# Patient Record
Sex: FEMALE | NOT HISPANIC OR LATINO | URBAN - METROPOLITAN AREA
[De-identification: names, ages, dates, MRNs, and addresses within clinical notes are randomized per-mention and may not be internally consistent; named-entity substitution may affect disease eponyms.]

---

## 2017-08-20 ENCOUNTER — CHARTING TRANS (OUTPATIENT)
Dept: URGENT CARE | Age: 47
End: 2017-08-20

## 2017-08-20 ASSESSMENT — PAIN SCALES - GENERAL: PAINLEVEL_OUTOF10: 8

## 2017-08-21 ENCOUNTER — CHARTING TRANS (OUTPATIENT)
Dept: OTHER | Age: 47
End: 2017-08-21

## 2017-08-22 ENCOUNTER — CHARTING TRANS (OUTPATIENT)
Dept: OTHER | Age: 47
End: 2017-08-22

## 2018-01-24 ENCOUNTER — OFFICE VISIT (OUTPATIENT)
Dept: OTHER | Facility: HOSPITAL | Age: 48
End: 2018-01-24
Attending: FAMILY MEDICINE
Payer: OTHER MISCELLANEOUS

## 2018-01-24 DIAGNOSIS — S86.911A KNEE STRAIN, RIGHT, INITIAL ENCOUNTER: Primary | ICD-10-CM

## 2018-01-26 ENCOUNTER — APPOINTMENT (OUTPATIENT)
Dept: OTHER | Facility: HOSPITAL | Age: 48
End: 2018-01-26
Attending: PREVENTIVE MEDICINE
Payer: OTHER MISCELLANEOUS

## 2018-11-28 VITALS
SYSTOLIC BLOOD PRESSURE: 143 MMHG | DIASTOLIC BLOOD PRESSURE: 78 MMHG | HEART RATE: 93 BPM | TEMPERATURE: 99.4 F | RESPIRATION RATE: 16 BRPM | OXYGEN SATURATION: 98 %

## 2022-08-24 ENCOUNTER — TELEPHONE (OUTPATIENT)
Dept: SCHEDULING | Age: 52
End: 2022-08-24

## 2024-01-01 ENCOUNTER — APPOINTMENT (OUTPATIENT)
Dept: GENERAL RADIOLOGY | Facility: HOSPITAL | Age: 54
End: 2024-01-01
Attending: NURSE PRACTITIONER
Payer: COMMERCIAL

## 2024-01-01 ENCOUNTER — APPOINTMENT (OUTPATIENT)
Dept: CT IMAGING | Facility: HOSPITAL | Age: 54
End: 2024-01-01
Attending: EMERGENCY MEDICINE
Payer: COMMERCIAL

## 2024-01-01 ENCOUNTER — APPOINTMENT (OUTPATIENT)
Dept: ULTRASOUND IMAGING | Facility: HOSPITAL | Age: 54
End: 2024-01-01
Attending: HOSPITALIST
Payer: COMMERCIAL

## 2024-01-01 ENCOUNTER — APPOINTMENT (OUTPATIENT)
Dept: CT IMAGING | Facility: HOSPITAL | Age: 54
End: 2024-01-01
Attending: HOSPITALIST
Payer: COMMERCIAL

## 2024-01-01 ENCOUNTER — HOSPITAL ENCOUNTER (INPATIENT)
Facility: HOSPITAL | Age: 54
LOS: 11 days | End: 2024-01-01
Attending: EMERGENCY MEDICINE | Admitting: HOSPITALIST
Payer: COMMERCIAL

## 2024-01-01 ENCOUNTER — APPOINTMENT (OUTPATIENT)
Dept: CV DIAGNOSTICS | Facility: HOSPITAL | Age: 54
End: 2024-01-01
Payer: COMMERCIAL

## 2024-01-01 ENCOUNTER — APPOINTMENT (OUTPATIENT)
Dept: GENERAL RADIOLOGY | Facility: HOSPITAL | Age: 54
End: 2024-01-01
Attending: INTERNAL MEDICINE
Payer: COMMERCIAL

## 2024-01-01 ENCOUNTER — APPOINTMENT (OUTPATIENT)
Dept: GENERAL RADIOLOGY | Facility: HOSPITAL | Age: 54
End: 2024-01-01
Attending: HOSPITALIST
Payer: COMMERCIAL

## 2024-01-01 ENCOUNTER — APPOINTMENT (OUTPATIENT)
Dept: GENERAL RADIOLOGY | Facility: HOSPITAL | Age: 54
End: 2024-01-01
Attending: EMERGENCY MEDICINE
Payer: COMMERCIAL

## 2024-01-01 ENCOUNTER — GENETICS ENCOUNTER (OUTPATIENT)
Dept: HEMATOLOGY/ONCOLOGY | Facility: HOSPITAL | Age: 54
End: 2024-01-01

## 2024-01-01 ENCOUNTER — APPOINTMENT (OUTPATIENT)
Dept: ULTRASOUND IMAGING | Facility: HOSPITAL | Age: 54
End: 2024-01-01
Attending: PHYSICIAN ASSISTANT
Payer: COMMERCIAL

## 2024-01-01 ENCOUNTER — APPOINTMENT (OUTPATIENT)
Dept: MRI IMAGING | Facility: HOSPITAL | Age: 54
End: 2024-01-01
Attending: HOSPITALIST
Payer: COMMERCIAL

## 2024-01-01 ENCOUNTER — APPOINTMENT (OUTPATIENT)
Dept: GENERAL RADIOLOGY | Facility: HOSPITAL | Age: 54
End: 2024-01-01
Payer: COMMERCIAL

## 2024-01-01 VITALS
HEIGHT: 62 IN | OXYGEN SATURATION: 43 % | TEMPERATURE: 98 F | BODY MASS INDEX: 34.03 KG/M2 | DIASTOLIC BLOOD PRESSURE: 59 MMHG | WEIGHT: 184.94 LBS | RESPIRATION RATE: 42 BRPM | SYSTOLIC BLOOD PRESSURE: 117 MMHG

## 2024-01-01 DIAGNOSIS — D72.829 LEUKOCYTOSIS, UNSPECIFIED TYPE: ICD-10-CM

## 2024-01-01 DIAGNOSIS — D64.9 ANEMIA, UNSPECIFIED TYPE: ICD-10-CM

## 2024-01-01 DIAGNOSIS — C79.9 METASTATIC MALIGNANT NEOPLASM, UNSPECIFIED SITE (HCC): Primary | ICD-10-CM

## 2024-01-01 DIAGNOSIS — E87.1 HYPONATREMIA: ICD-10-CM

## 2024-01-01 LAB
ADENOVIRUS PCR:: NOT DETECTED
AFP-TM SERPL-MCNC: 8.7 NG/ML (ref ?–8)
ALBUMIN SERPL-MCNC: 2.9 G/DL (ref 3.4–5)
ALBUMIN SERPL-MCNC: 3 G/DL (ref 3.4–5)
ALBUMIN SERPL-MCNC: 3 G/DL (ref 3.4–5)
ALBUMIN SERPL-MCNC: 3.1 G/DL (ref 3.4–5)
ALBUMIN SERPL-MCNC: 3.6 G/DL (ref 3.4–5)
ALBUMIN/GLOB SERPL: 0.8 {RATIO} (ref 1–2)
ALBUMIN/GLOB SERPL: 0.9 {RATIO} (ref 1–2)
ALP LIVER SERPL-CCNC: 193 U/L
ALP LIVER SERPL-CCNC: 211 U/L
ALP LIVER SERPL-CCNC: 294 U/L
ALT SERPL-CCNC: 36 U/L
ALT SERPL-CCNC: 39 U/L
ALT SERPL-CCNC: 46 U/L
ALT SERPL-CCNC: 48 U/L
ALT SERPL-CCNC: 48 U/L
AMMONIA PLAS-MCNC: 55 UMOL/L (ref 11–32)
AMMONIA PLAS-MCNC: 61 UMOL/L (ref 11–32)
ANION GAP SERPL CALC-SCNC: 0 MMOL/L (ref 0–18)
ANION GAP SERPL CALC-SCNC: 10 MMOL/L (ref 0–18)
ANION GAP SERPL CALC-SCNC: 11 MMOL/L (ref 0–18)
ANION GAP SERPL CALC-SCNC: 11 MMOL/L (ref 0–18)
ANION GAP SERPL CALC-SCNC: 13 MMOL/L (ref 0–18)
ANION GAP SERPL CALC-SCNC: 3 MMOL/L (ref 0–18)
ANION GAP SERPL CALC-SCNC: 4 MMOL/L (ref 0–18)
ANION GAP SERPL CALC-SCNC: 5 MMOL/L (ref 0–18)
ANION GAP SERPL CALC-SCNC: 5 MMOL/L (ref 0–18)
ANION GAP SERPL CALC-SCNC: 6 MMOL/L (ref 0–18)
ANION GAP SERPL CALC-SCNC: 6 MMOL/L (ref 0–18)
ANION GAP SERPL CALC-SCNC: 7 MMOL/L (ref 0–18)
ANION GAP SERPL CALC-SCNC: 8 MMOL/L (ref 0–18)
ANION GAP SERPL CALC-SCNC: 9 MMOL/L (ref 0–18)
ANTIBODY SCREEN: NEGATIVE
APTT PPP: 28.4 SECONDS (ref 23.3–35.6)
ARTERIAL PATENCY WRIST A: POSITIVE
AST SERPL-CCNC: 117 U/L (ref 15–37)
AST SERPL-CCNC: 120 U/L (ref 15–37)
AST SERPL-CCNC: 120 U/L (ref 15–37)
AST SERPL-CCNC: 75 U/L (ref 15–37)
AST SERPL-CCNC: 96 U/L (ref 15–37)
ATRIAL RATE: 106 BPM
ATRIAL RATE: 115 BPM
ATRIAL RATE: 241 BPM
B PARAPERT DNA SPEC QL NAA+PROBE: NOT DETECTED
B PERT DNA SPEC QL NAA+PROBE: NOT DETECTED
BASE EXCESS BLDA CALC-SCNC: -2.3 MMOL/L (ref ?–2)
BASE EXCESS BLDA CALC-SCNC: -2.5 MMOL/L (ref ?–2)
BASE EXCESS BLDA CALC-SCNC: -2.8 MMOL/L (ref ?–2)
BASE EXCESS BLDA CALC-SCNC: 0.1 MMOL/L (ref ?–2)
BASE EXCESS BLDA CALC-SCNC: 1.3 MMOL/L (ref ?–2)
BASOPHILS # BLD: 0 X10(3) UL (ref 0–0.2)
BASOPHILS # BLD: 0.37 X10(3) UL (ref 0–0.2)
BASOPHILS # BLD: 0.64 X10(3) UL (ref 0–0.2)
BASOPHILS # BLD: 0.95 X10(3) UL (ref 0–0.2)
BASOPHILS NFR BLD: 0 %
BASOPHILS NFR BLD: 1 %
BASOPHILS NFR BLD: 2 %
BASOPHILS NFR BLD: 3 %
BILIRUB DIRECT SERPL-MCNC: 0.4 MG/DL (ref 0–0.2)
BILIRUB DIRECT SERPL-MCNC: 0.6 MG/DL (ref 0–0.2)
BILIRUB DIRECT SERPL-MCNC: 0.6 MG/DL (ref 0–0.2)
BILIRUB SERPL-MCNC: 0.8 MG/DL (ref 0.1–2)
BILIRUB SERPL-MCNC: 0.9 MG/DL (ref 0.1–2)
BILIRUB SERPL-MCNC: 1 MG/DL (ref 0.1–2)
BILIRUB UR QL STRIP.AUTO: NEGATIVE
BLOOD TYPE BARCODE: 5100
BLOOD TYPE BARCODE: 5100
BODY TEMPERATURE: 98.6 F
BUN BLD-MCNC: 12 MG/DL (ref 9–23)
BUN BLD-MCNC: 12 MG/DL (ref 9–23)
BUN BLD-MCNC: 13 MG/DL (ref 9–23)
BUN BLD-MCNC: 14 MG/DL (ref 9–23)
BUN BLD-MCNC: 15 MG/DL (ref 9–23)
BUN BLD-MCNC: 16 MG/DL (ref 9–23)
BUN BLD-MCNC: 18 MG/DL (ref 9–23)
BUN BLD-MCNC: 23 MG/DL (ref 9–23)
BUN BLD-MCNC: 25 MG/DL (ref 9–23)
BUN BLD-MCNC: 26 MG/DL (ref 9–23)
BUN BLD-MCNC: 28 MG/DL (ref 9–23)
BUN BLD-MCNC: 33 MG/DL (ref 9–23)
BUN BLD-MCNC: 34 MG/DL (ref 9–23)
BUN BLD-MCNC: 37 MG/DL (ref 9–23)
BUN BLD-MCNC: 38 MG/DL (ref 9–23)
BUN BLD-MCNC: 9 MG/DL (ref 9–23)
C PNEUM DNA SPEC QL NAA+PROBE: NOT DETECTED
CALCIUM BLD-MCNC: 10 MG/DL (ref 8.5–10.1)
CALCIUM BLD-MCNC: 10.1 MG/DL (ref 8.5–10.1)
CALCIUM BLD-MCNC: 10.2 MG/DL (ref 8.5–10.1)
CALCIUM BLD-MCNC: 10.2 MG/DL (ref 8.5–10.1)
CALCIUM BLD-MCNC: 10.3 MG/DL (ref 8.5–10.1)
CALCIUM BLD-MCNC: 10.5 MG/DL (ref 8.5–10.1)
CALCIUM BLD-MCNC: 8.6 MG/DL (ref 8.5–10.1)
CALCIUM BLD-MCNC: 8.7 MG/DL (ref 8.5–10.1)
CALCIUM BLD-MCNC: 8.8 MG/DL (ref 8.5–10.1)
CALCIUM BLD-MCNC: 8.8 MG/DL (ref 8.5–10.1)
CALCIUM BLD-MCNC: 8.9 MG/DL (ref 8.5–10.1)
CALCIUM BLD-MCNC: 9 MG/DL (ref 8.5–10.1)
CALCIUM BLD-MCNC: 9 MG/DL (ref 8.5–10.1)
CALCIUM BLD-MCNC: 9.1 MG/DL (ref 8.5–10.1)
CALCIUM BLD-MCNC: 9.1 MG/DL (ref 8.5–10.1)
CALCIUM BLD-MCNC: 9.3 MG/DL (ref 8.5–10.1)
CALCIUM BLD-MCNC: 9.3 MG/DL (ref 8.5–10.1)
CALCIUM BLD-MCNC: 9.4 MG/DL (ref 8.5–10.1)
CALCIUM BLD-MCNC: 9.7 MG/DL (ref 8.5–10.1)
CALCIUM BLD-MCNC: 9.7 MG/DL (ref 8.5–10.1)
CALCIUM BLD-MCNC: 9.8 MG/DL (ref 8.5–10.1)
CALCIUM BLD-MCNC: 9.9 MG/DL (ref 8.5–10.1)
CALCIUM BLD-MCNC: 9.9 MG/DL (ref 8.5–10.1)
CANCER AG125 SERPL-ACNC: 61.7 U/ML (ref ?–35)
CANCER AG19-9 SERPL-ACNC: 150.3 U/ML (ref ?–37)
CEA SERPL-MCNC: 203.3 NG/ML (ref ?–5)
CHLORIDE SERPL-SCNC: 101 MMOL/L (ref 98–112)
CHLORIDE SERPL-SCNC: 105 MMOL/L (ref 98–112)
CHLORIDE SERPL-SCNC: 107 MMOL/L (ref 98–112)
CHLORIDE SERPL-SCNC: 109 MMOL/L (ref 98–112)
CHLORIDE SERPL-SCNC: 110 MMOL/L (ref 98–112)
CHLORIDE SERPL-SCNC: 111 MMOL/L (ref 98–112)
CHLORIDE SERPL-SCNC: 112 MMOL/L (ref 98–112)
CHLORIDE SERPL-SCNC: 112 MMOL/L (ref 98–112)
CHLORIDE SERPL-SCNC: 113 MMOL/L (ref 98–112)
CHLORIDE SERPL-SCNC: 114 MMOL/L (ref 98–112)
CHLORIDE SERPL-SCNC: 114 MMOL/L (ref 98–112)
CHLORIDE SERPL-SCNC: 92 MMOL/L (ref 98–112)
CHLORIDE SERPL-SCNC: 95 MMOL/L (ref 98–112)
CHLORIDE SERPL-SCNC: 97 MMOL/L (ref 98–112)
CHLORIDE SERPL-SCNC: 98 MMOL/L (ref 98–112)
CHLORIDE SERPL-SCNC: 99 MMOL/L (ref 98–112)
CLARITY UR REFRACT.AUTO: CLEAR
CO2 SERPL-SCNC: 19 MMOL/L (ref 21–32)
CO2 SERPL-SCNC: 20 MMOL/L (ref 21–32)
CO2 SERPL-SCNC: 20 MMOL/L (ref 21–32)
CO2 SERPL-SCNC: 21 MMOL/L (ref 21–32)
CO2 SERPL-SCNC: 22 MMOL/L (ref 21–32)
CO2 SERPL-SCNC: 22 MMOL/L (ref 21–32)
CO2 SERPL-SCNC: 23 MMOL/L (ref 21–32)
CO2 SERPL-SCNC: 23 MMOL/L (ref 21–32)
CO2 SERPL-SCNC: 24 MMOL/L (ref 21–32)
CO2 SERPL-SCNC: 25 MMOL/L (ref 21–32)
CO2 SERPL-SCNC: 26 MMOL/L (ref 21–32)
CO2 SERPL-SCNC: 26 MMOL/L (ref 21–32)
CO2 SERPL-SCNC: 27 MMOL/L (ref 21–32)
CO2 SERPL-SCNC: 28 MMOL/L (ref 21–32)
CO2 SERPL-SCNC: 28 MMOL/L (ref 21–32)
COHGB MFR BLD: 1.3 % SAT (ref 0–3)
COHGB MFR BLD: 1.3 % SAT (ref 0–3)
COHGB MFR BLD: 1.4 % SAT (ref 0–3)
COHGB MFR BLD: 1.8 % SAT (ref 0–3)
COHGB MFR BLD: 2 % SAT (ref 0–3)
COLOR UR AUTO: YELLOW
COLOR UR AUTO: YELLOW
CORONAVIRUS 229E PCR:: NOT DETECTED
CORONAVIRUS HKU1 PCR:: NOT DETECTED
CORONAVIRUS NL63 PCR:: NOT DETECTED
CORONAVIRUS OC43 PCR:: NOT DETECTED
CREAT BLD-MCNC: 0.52 MG/DL
CREAT BLD-MCNC: 0.55 MG/DL
CREAT BLD-MCNC: 0.56 MG/DL
CREAT BLD-MCNC: 0.57 MG/DL
CREAT BLD-MCNC: 0.58 MG/DL
CREAT BLD-MCNC: 0.6 MG/DL
CREAT BLD-MCNC: 0.62 MG/DL
CREAT BLD-MCNC: 0.62 MG/DL
CREAT BLD-MCNC: 0.63 MG/DL
CREAT BLD-MCNC: 0.63 MG/DL
CREAT BLD-MCNC: 0.64 MG/DL
CREAT BLD-MCNC: 0.65 MG/DL
CREAT BLD-MCNC: 0.65 MG/DL
CREAT BLD-MCNC: 0.66 MG/DL
CREAT BLD-MCNC: 0.68 MG/DL
CREAT BLD-MCNC: 0.69 MG/DL
CREAT BLD-MCNC: 0.71 MG/DL
CREAT BLD-MCNC: 0.75 MG/DL
CREAT BLD-MCNC: 0.79 MG/DL
CREAT BLD-MCNC: 0.8 MG/DL
CREAT BLD-MCNC: 0.81 MG/DL
CREAT BLD-MCNC: 0.87 MG/DL
CREAT BLD-MCNC: 0.95 MG/DL
CREAT BLD-MCNC: 0.97 MG/DL
CREAT BLD-MCNC: 1.18 MG/DL
DEPRECATED HBV CORE AB SER IA-ACNC: 54 NG/ML
EGFRCR SERPLBLD CKD-EPI 2021: 102 ML/MIN/1.73M2 (ref 60–?)
EGFRCR SERPLBLD CKD-EPI 2021: 104 ML/MIN/1.73M2 (ref 60–?)
EGFRCR SERPLBLD CKD-EPI 2021: 105 ML/MIN/1.73M2 (ref 60–?)
EGFRCR SERPLBLD CKD-EPI 2021: 106 ML/MIN/1.73M2 (ref 60–?)
EGFRCR SERPLBLD CKD-EPI 2021: 107 ML/MIN/1.73M2 (ref 60–?)
EGFRCR SERPLBLD CKD-EPI 2021: 108 ML/MIN/1.73M2 (ref 60–?)
EGFRCR SERPLBLD CKD-EPI 2021: 109 ML/MIN/1.73M2 (ref 60–?)
EGFRCR SERPLBLD CKD-EPI 2021: 109 ML/MIN/1.73M2 (ref 60–?)
EGFRCR SERPLBLD CKD-EPI 2021: 110 ML/MIN/1.73M2 (ref 60–?)
EGFRCR SERPLBLD CKD-EPI 2021: 111 ML/MIN/1.73M2 (ref 60–?)
EGFRCR SERPLBLD CKD-EPI 2021: 55 ML/MIN/1.73M2 (ref 60–?)
EGFRCR SERPLBLD CKD-EPI 2021: 70 ML/MIN/1.73M2 (ref 60–?)
EGFRCR SERPLBLD CKD-EPI 2021: 72 ML/MIN/1.73M2 (ref 60–?)
EGFRCR SERPLBLD CKD-EPI 2021: 80 ML/MIN/1.73M2 (ref 60–?)
EGFRCR SERPLBLD CKD-EPI 2021: 87 ML/MIN/1.73M2 (ref 60–?)
EGFRCR SERPLBLD CKD-EPI 2021: 88 ML/MIN/1.73M2 (ref 60–?)
EGFRCR SERPLBLD CKD-EPI 2021: 89 ML/MIN/1.73M2 (ref 60–?)
EGFRCR SERPLBLD CKD-EPI 2021: 95 ML/MIN/1.73M2 (ref 60–?)
EOSINOPHIL # BLD: 0 X10(3) UL (ref 0–0.7)
EOSINOPHIL # BLD: 0.31 X10(3) UL (ref 0–0.7)
EOSINOPHIL # BLD: 0.32 X10(3) UL (ref 0–0.7)
EOSINOPHIL # BLD: 0.35 X10(3) UL (ref 0–0.7)
EOSINOPHIL # BLD: 0.39 X10(3) UL (ref 0–0.7)
EOSINOPHIL # BLD: 1.23 X10(3) UL (ref 0–0.7)
EOSINOPHIL NFR BLD: 0 %
EOSINOPHIL NFR BLD: 1 %
EOSINOPHIL NFR BLD: 4 %
ERYTHROCYTE [DISTWIDTH] IN BLOOD BY AUTOMATED COUNT: 17.2 %
ERYTHROCYTE [DISTWIDTH] IN BLOOD BY AUTOMATED COUNT: 17.4 %
ERYTHROCYTE [DISTWIDTH] IN BLOOD BY AUTOMATED COUNT: 19.3 %
ERYTHROCYTE [DISTWIDTH] IN BLOOD BY AUTOMATED COUNT: 20.2 %
ERYTHROCYTE [DISTWIDTH] IN BLOOD BY AUTOMATED COUNT: 20.8 %
ERYTHROCYTE [DISTWIDTH] IN BLOOD BY AUTOMATED COUNT: 21.5 %
ERYTHROCYTE [DISTWIDTH] IN BLOOD BY AUTOMATED COUNT: 22.7 %
ERYTHROCYTE [DISTWIDTH] IN BLOOD BY AUTOMATED COUNT: 23.6 %
ERYTHROCYTE [DISTWIDTH] IN BLOOD BY AUTOMATED COUNT: 24.1 %
ERYTHROCYTE [DISTWIDTH] IN BLOOD BY AUTOMATED COUNT: 24.1 %
ERYTHROCYTE [DISTWIDTH] IN BLOOD BY AUTOMATED COUNT: 24.7 %
ERYTHROCYTE [DISTWIDTH] IN BLOOD BY AUTOMATED COUNT: 24.9 %
ERYTHROCYTE [DISTWIDTH] IN BLOOD BY AUTOMATED COUNT: 25.3 %
EXPIRATORY PRESSURE: 5 CM H2O
EXPIRATORY PRESSURE: 5 CM H2O
FIO2: 30 %
FIO2: 30 %
FIO2: 40 %
FLUAV + FLUBV RNA SPEC NAA+PROBE: NEGATIVE
FLUAV + FLUBV RNA SPEC NAA+PROBE: NEGATIVE
FLUAV RNA SPEC QL NAA+PROBE: NOT DETECTED
FLUBV RNA SPEC QL NAA+PROBE: NOT DETECTED
GLOBULIN PLAS-MCNC: 3.9 G/DL (ref 2.8–4.4)
GLOBULIN PLAS-MCNC: 4.1 G/DL (ref 2.8–4.4)
GLUCOSE BLD-MCNC: 101 MG/DL (ref 70–99)
GLUCOSE BLD-MCNC: 101 MG/DL (ref 70–99)
GLUCOSE BLD-MCNC: 102 MG/DL (ref 70–99)
GLUCOSE BLD-MCNC: 104 MG/DL (ref 70–99)
GLUCOSE BLD-MCNC: 106 MG/DL (ref 70–99)
GLUCOSE BLD-MCNC: 106 MG/DL (ref 70–99)
GLUCOSE BLD-MCNC: 107 MG/DL (ref 70–99)
GLUCOSE BLD-MCNC: 107 MG/DL (ref 70–99)
GLUCOSE BLD-MCNC: 108 MG/DL (ref 70–99)
GLUCOSE BLD-MCNC: 111 MG/DL (ref 70–99)
GLUCOSE BLD-MCNC: 115 MG/DL (ref 70–99)
GLUCOSE BLD-MCNC: 117 MG/DL (ref 70–99)
GLUCOSE BLD-MCNC: 120 MG/DL (ref 70–99)
GLUCOSE BLD-MCNC: 122 MG/DL (ref 70–99)
GLUCOSE BLD-MCNC: 125 MG/DL (ref 70–99)
GLUCOSE BLD-MCNC: 126 MG/DL (ref 70–99)
GLUCOSE BLD-MCNC: 127 MG/DL (ref 70–99)
GLUCOSE BLD-MCNC: 131 MG/DL (ref 70–99)
GLUCOSE BLD-MCNC: 131 MG/DL (ref 70–99)
GLUCOSE BLD-MCNC: 138 MG/DL (ref 70–99)
GLUCOSE BLD-MCNC: 140 MG/DL (ref 70–99)
GLUCOSE BLD-MCNC: 145 MG/DL (ref 70–99)
GLUCOSE BLD-MCNC: 145 MG/DL (ref 70–99)
GLUCOSE BLD-MCNC: 147 MG/DL (ref 70–99)
GLUCOSE BLD-MCNC: 79 MG/DL (ref 70–99)
GLUCOSE BLD-MCNC: 95 MG/DL (ref 70–99)
GLUCOSE BLD-MCNC: 96 MG/DL (ref 70–99)
GLUCOSE BLD-MCNC: 96 MG/DL (ref 70–99)
GLUCOSE BLD-MCNC: 97 MG/DL (ref 70–99)
GLUCOSE BLD-MCNC: 98 MG/DL (ref 70–99)
GLUCOSE BLD-MCNC: 98 MG/DL (ref 70–99)
GLUCOSE BLD-MCNC: 99 MG/DL (ref 70–99)
GLUCOSE UR STRIP.AUTO-MCNC: NORMAL MG/DL
HAPTOGLOB SERPL-MCNC: 259 MG/DL (ref 30–200)
HAPTOGLOB SERPL-MCNC: 292 MG/DL (ref 30–200)
HCG BETA SUBUNIT TUMOR MARKER QN: 6 MIU/ML
HCO3 BLDA-SCNC: 22.7 MEQ/L (ref 21–27)
HCO3 BLDA-SCNC: 23 MEQ/L (ref 21–27)
HCO3 BLDA-SCNC: 23.2 MEQ/L (ref 21–27)
HCO3 BLDA-SCNC: 25 MEQ/L (ref 21–27)
HCO3 BLDA-SCNC: 26 MEQ/L (ref 21–27)
HCT VFR BLD AUTO: 20.7 %
HCT VFR BLD AUTO: 22.1 %
HCT VFR BLD AUTO: 23.1 %
HCT VFR BLD AUTO: 23.8 %
HCT VFR BLD AUTO: 24.6 %
HCT VFR BLD AUTO: 25.4 %
HCT VFR BLD AUTO: 25.5 %
HCT VFR BLD AUTO: 25.8 %
HCT VFR BLD AUTO: 25.8 %
HCT VFR BLD AUTO: 26 %
HCT VFR BLD AUTO: 26.4 %
HCT VFR BLD AUTO: 27.8 %
HCT VFR BLD AUTO: 29.2 %
HGB BLD-MCNC: 6.1 G/DL
HGB BLD-MCNC: 6.6 G/DL
HGB BLD-MCNC: 7 G/DL
HGB BLD-MCNC: 7.1 G/DL
HGB BLD-MCNC: 7.1 G/DL
HGB BLD-MCNC: 7.4 G/DL
HGB BLD-MCNC: 7.5 G/DL
HGB BLD-MCNC: 7.6 G/DL
HGB BLD-MCNC: 7.6 G/DL
HGB BLD-MCNC: 7.7 G/DL
HGB BLD-MCNC: 7.9 G/DL
HGB BLD-MCNC: 7.9 G/DL
HGB BLD-MCNC: 8 G/DL
HGB BLD-MCNC: 8 G/DL
HGB BLD-MCNC: 8.1 G/DL
HGB BLD-MCNC: 8.5 G/DL
HGB BLD-MCNC: 8.5 G/DL
HGB BLD-MCNC: 8.6 G/DL
HGB RETIC QN AUTO: 23.5 PG (ref 28.2–36.6)
HYALINE CASTS #/AREA URNS AUTO: PRESENT /LPF
IGA SERPL-MCNC: 113.1 MG/DL (ref 70–312)
IMM RETICS NFR: 0.41 RATIO (ref 0.1–0.3)
INR BLD: 1.07 (ref 0.8–1.2)
INSP PRESSURE: 12 CM H2O
IPAP MAX: 30 CM H2O
IPAP MIN: 15 CM H2O
IRON SATN MFR SERPL: 3 %
IRON SERPL-MCNC: 12 UG/DL
KETONES UR STRIP.AUTO-MCNC: 10 MG/DL
KETONES UR STRIP.AUTO-MCNC: 10 MG/DL
KETONES UR STRIP.AUTO-MCNC: NEGATIVE MG/DL
L/M: 10 L/MIN
L/M: 6 L/MIN
LACTATE SERPL-SCNC: 1.7 MMOL/L (ref 0.4–2)
LACTATE SERPL-SCNC: 2.2 MMOL/L (ref 0.4–2)
LDH SERPL L TO P-CCNC: 1190 U/L
LDH SERPL L TO P-CCNC: 1331 U/L
LEUKOCYTE ESTERASE UR QL STRIP.AUTO: 250
LEUKOCYTE ESTERASE UR QL STRIP.AUTO: NEGATIVE
LEUKOCYTE ESTERASE UR QL STRIP.AUTO: NEGATIVE
LYMPHOCYTES NFR BLD: 0.71 X10(3) UL (ref 1–4)
LYMPHOCYTES NFR BLD: 0.92 X10(3) UL (ref 1–4)
LYMPHOCYTES NFR BLD: 0.92 X10(3) UL (ref 1–4)
LYMPHOCYTES NFR BLD: 1.48 X10(3) UL (ref 1–4)
LYMPHOCYTES NFR BLD: 1.55 X10(3) UL (ref 1–4)
LYMPHOCYTES NFR BLD: 1.58 X10(3) UL (ref 1–4)
LYMPHOCYTES NFR BLD: 1.61 X10(3) UL (ref 1–4)
LYMPHOCYTES NFR BLD: 1.64 X10(3) UL (ref 1–4)
LYMPHOCYTES NFR BLD: 1.78 X10(3) UL (ref 1–4)
LYMPHOCYTES NFR BLD: 1.88 X10(3) UL (ref 1–4)
LYMPHOCYTES NFR BLD: 1.93 X10(3) UL (ref 1–4)
LYMPHOCYTES NFR BLD: 2 %
LYMPHOCYTES NFR BLD: 2.05 X10(3) UL (ref 1–4)
LYMPHOCYTES NFR BLD: 3 %
LYMPHOCYTES NFR BLD: 3 %
LYMPHOCYTES NFR BLD: 3.03 X10(3) UL (ref 1–4)
LYMPHOCYTES NFR BLD: 4 %
LYMPHOCYTES NFR BLD: 4 %
LYMPHOCYTES NFR BLD: 5 %
LYMPHOCYTES NFR BLD: 6 %
MAGNESIUM SERPL-MCNC: 2.7 MG/DL (ref 1.6–2.6)
MCH RBC QN AUTO: 19.9 PG (ref 26–34)
MCH RBC QN AUTO: 20.1 PG (ref 26–34)
MCH RBC QN AUTO: 21.3 PG (ref 26–34)
MCH RBC QN AUTO: 22.1 PG (ref 26–34)
MCH RBC QN AUTO: 22.1 PG (ref 26–34)
MCH RBC QN AUTO: 22.3 PG (ref 26–34)
MCH RBC QN AUTO: 22.4 PG (ref 26–34)
MCH RBC QN AUTO: 22.4 PG (ref 26–34)
MCH RBC QN AUTO: 22.6 PG (ref 26–34)
MCH RBC QN AUTO: 22.6 PG (ref 26–34)
MCH RBC QN AUTO: 22.9 PG (ref 26–34)
MCHC RBC AUTO-ENTMCNC: 27.4 G/DL (ref 31–37)
MCHC RBC AUTO-ENTMCNC: 28.8 G/DL (ref 31–37)
MCHC RBC AUTO-ENTMCNC: 29 G/DL (ref 31–37)
MCHC RBC AUTO-ENTMCNC: 29.5 G/DL (ref 31–37)
MCHC RBC AUTO-ENTMCNC: 29.9 G/DL (ref 31–37)
MCHC RBC AUTO-ENTMCNC: 29.9 G/DL (ref 31–37)
MCHC RBC AUTO-ENTMCNC: 30.3 G/DL (ref 31–37)
MCHC RBC AUTO-ENTMCNC: 30.3 G/DL (ref 31–37)
MCHC RBC AUTO-ENTMCNC: 30.5 G/DL (ref 31–37)
MCHC RBC AUTO-ENTMCNC: 30.6 G/DL (ref 31–37)
MCHC RBC AUTO-ENTMCNC: 31.5 G/DL (ref 31–37)
MCV RBC AUTO: 66.4 FL
MCV RBC AUTO: 67.6 FL
MCV RBC AUTO: 70.8 FL
MCV RBC AUTO: 71.3 FL
MCV RBC AUTO: 72.6 FL
MCV RBC AUTO: 73.7 FL
MCV RBC AUTO: 74.7 FL
MCV RBC AUTO: 74.9 FL
MCV RBC AUTO: 75.9 FL
MCV RBC AUTO: 76.6 FL
MCV RBC AUTO: 76.8 FL
MCV RBC AUTO: 77.2 FL
MCV RBC AUTO: 81.8 FL
METAMYELOCYTES # BLD: 0.3 X10(3) UL
METAMYELOCYTES # BLD: 0.32 X10(3) UL
METAMYELOCYTES # BLD: 0.32 X10(3) UL
METAMYELOCYTES # BLD: 0.38 X10(3) UL
METAMYELOCYTES # BLD: 0.64 X10(3) UL
METAMYELOCYTES # BLD: 0.68 X10(3) UL
METAMYELOCYTES # BLD: 1.01 X10(3) UL
METAMYELOCYTES # BLD: 1.11 X10(3) UL
METAMYELOCYTES NFR BLD: 1 %
METAMYELOCYTES NFR BLD: 2 %
METAMYELOCYTES NFR BLD: 3 %
METAPNEUMOVIRUS PCR:: NOT DETECTED
METHGB MFR BLD: 0 % SAT (ref 0.4–1.5)
METHGB MFR BLD: 1 % SAT (ref 0.4–1.5)
METHGB MFR BLD: 1.2 % SAT (ref 0.4–1.5)
MONOCYTES # BLD: 1.1 X10(3) UL (ref 0.1–1)
MONOCYTES # BLD: 1.23 X10(3) UL (ref 0.1–1)
MONOCYTES # BLD: 1.55 X10(3) UL (ref 0.1–1)
MONOCYTES # BLD: 1.71 X10(3) UL (ref 0.1–1)
MONOCYTES # BLD: 1.9 X10(3) UL (ref 0.1–1)
MONOCYTES # BLD: 2.16 X10(3) UL (ref 0.1–1)
MONOCYTES # BLD: 2.25 X10(3) UL (ref 0.1–1)
MONOCYTES # BLD: 2.53 X10(3) UL (ref 0.1–1)
MONOCYTES # BLD: 2.58 X10(3) UL (ref 0.1–1)
MONOCYTES # BLD: 2.63 X10(3) UL (ref 0.1–1)
MONOCYTES # BLD: 2.66 X10(3) UL (ref 0.1–1)
MONOCYTES # BLD: 3.33 X10(3) UL (ref 0.1–1)
MONOCYTES # BLD: 3.88 X10(3) UL (ref 0.1–1)
MONOCYTES NFR BLD: 11 %
MONOCYTES NFR BLD: 4 %
MONOCYTES NFR BLD: 5 %
MONOCYTES NFR BLD: 5 %
MONOCYTES NFR BLD: 6 %
MONOCYTES NFR BLD: 7 %
MONOCYTES NFR BLD: 8 %
MONOCYTES NFR BLD: 9 %
MONOCYTES NFR BLD: 9 %
MORPHOLOGY: NORMAL
MRSA DNA SPEC QL NAA+PROBE: NEGATIVE
MYCOPLASMA PNEUMONIA PCR:: NOT DETECTED
MYELOCYTES # BLD: 0.3 X10(3) UL
MYELOCYTES # BLD: 2.02 X10(3) UL
MYELOCYTES NFR BLD: 1 %
MYELOCYTES NFR BLD: 4 %
NEUTROPHILS # BLD AUTO: 21.9 X10 (3) UL (ref 1.5–7.7)
NEUTROPHILS # BLD AUTO: 22.35 X10 (3) UL (ref 1.5–7.7)
NEUTROPHILS # BLD AUTO: 23.68 X10 (3) UL (ref 1.5–7.7)
NEUTROPHILS # BLD AUTO: 24.12 X10 (3) UL (ref 1.5–7.7)
NEUTROPHILS # BLD AUTO: 24.36 X10 (3) UL (ref 1.5–7.7)
NEUTROPHILS # BLD AUTO: 24.75 X10 (3) UL (ref 1.5–7.7)
NEUTROPHILS # BLD AUTO: 25.08 X10 (3) UL (ref 1.5–7.7)
NEUTROPHILS # BLD AUTO: 26.35 X10 (3) UL (ref 1.5–7.7)
NEUTROPHILS # BLD AUTO: 28.07 X10 (3) UL (ref 1.5–7.7)
NEUTROPHILS # BLD AUTO: 28.69 X10 (3) UL (ref 1.5–7.7)
NEUTROPHILS # BLD AUTO: 29.82 X10 (3) UL (ref 1.5–7.7)
NEUTROPHILS # BLD AUTO: 30.19 X10 (3) UL (ref 1.5–7.7)
NEUTROPHILS # BLD AUTO: 36.22 X10 (3) UL (ref 1.5–7.7)
NEUTROPHILS NFR BLD: 75 %
NEUTROPHILS NFR BLD: 78 %
NEUTROPHILS NFR BLD: 78 %
NEUTROPHILS NFR BLD: 79 %
NEUTROPHILS NFR BLD: 79 %
NEUTROPHILS NFR BLD: 82 %
NEUTROPHILS NFR BLD: 84 %
NEUTROPHILS NFR BLD: 84 %
NEUTROPHILS NFR BLD: 85 %
NEUTROPHILS NFR BLD: 86 %
NEUTROPHILS NFR BLD: 89 %
NEUTROPHILS NFR BLD: 91 %
NEUTROPHILS NFR BLD: 92 %
NEUTS BAND NFR BLD: 1 %
NEUTS BAND NFR BLD: 2 %
NEUTS BAND NFR BLD: 4 %
NEUTS BAND NFR BLD: 5 %
NEUTS BAND NFR BLD: 6 %
NEUTS BAND NFR BLD: 8 %
NEUTS BAND NFR BLD: 9 %
NEUTS HYPERSEG # BLD: 24.57 X10(3) UL (ref 1.5–7.7)
NEUTS HYPERSEG # BLD: 24.66 X10(3) UL (ref 1.5–7.7)
NEUTS HYPERSEG # BLD: 26.49 X10(3) UL (ref 1.5–7.7)
NEUTS HYPERSEG # BLD: 26.73 X10(3) UL (ref 1.5–7.7)
NEUTS HYPERSEG # BLD: 26.86 X10(3) UL (ref 1.5–7.7)
NEUTS HYPERSEG # BLD: 27.37 X10(3) UL (ref 1.5–7.7)
NEUTS HYPERSEG # BLD: 28.34 X10(3) UL (ref 1.5–7.7)
NEUTS HYPERSEG # BLD: 29.75 X10(3) UL (ref 1.5–7.7)
NEUTS HYPERSEG # BLD: 30.36 X10(3) UL (ref 1.5–7.7)
NEUTS HYPERSEG # BLD: 30.71 X10(3) UL (ref 1.5–7.7)
NEUTS HYPERSEG # BLD: 32.63 X10(3) UL (ref 1.5–7.7)
NEUTS HYPERSEG # BLD: 35.22 X10(3) UL (ref 1.5–7.7)
NEUTS HYPERSEG # BLD: 41.92 X10(3) UL (ref 1.5–7.7)
NITRITE UR QL STRIP.AUTO: NEGATIVE
NRBC BLD MANUAL-RTO: 1 %
NRBC BLD MANUAL-RTO: 15 %
NRBC BLD MANUAL-RTO: 2 %
NRBC BLD MANUAL-RTO: 2 %
NT-PROBNP SERPL-MCNC: 1934 PG/ML (ref ?–125)
NT-PROBNP SERPL-MCNC: 769 PG/ML (ref ?–125)
NT-PROBNP SERPL-MCNC: 915 PG/ML (ref ?–125)
OSMOLALITY SERPL CALC.SUM OF ELEC: 258 MOSM/KG (ref 275–295)
OSMOLALITY SERPL CALC.SUM OF ELEC: 261 MOSM/KG (ref 275–295)
OSMOLALITY SERPL CALC.SUM OF ELEC: 261 MOSM/KG (ref 275–295)
OSMOLALITY SERPL CALC.SUM OF ELEC: 266 MOSM/KG (ref 275–295)
OSMOLALITY SERPL CALC.SUM OF ELEC: 267 MOSM/KG (ref 275–295)
OSMOLALITY SERPL CALC.SUM OF ELEC: 267 MOSM/KG (ref 275–295)
OSMOLALITY SERPL CALC.SUM OF ELEC: 268 MOSM/KG (ref 275–295)
OSMOLALITY SERPL CALC.SUM OF ELEC: 269 MOSM/KG (ref 275–295)
OSMOLALITY SERPL CALC.SUM OF ELEC: 270 MOSM/KG (ref 275–295)
OSMOLALITY SERPL CALC.SUM OF ELEC: 271 MOSM/KG (ref 275–295)
OSMOLALITY SERPL CALC.SUM OF ELEC: 272 MOSM/KG (ref 275–295)
OSMOLALITY SERPL CALC.SUM OF ELEC: 272 MOSM/KG (ref 275–295)
OSMOLALITY SERPL CALC.SUM OF ELEC: 279 MOSM/KG (ref 275–295)
OSMOLALITY SERPL CALC.SUM OF ELEC: 283 MOSM/KG (ref 275–295)
OSMOLALITY SERPL CALC.SUM OF ELEC: 289 MOSM/KG (ref 275–295)
OSMOLALITY SERPL CALC.SUM OF ELEC: 291 MOSM/KG (ref 275–295)
OSMOLALITY SERPL CALC.SUM OF ELEC: 292 MOSM/KG (ref 275–295)
OSMOLALITY SERPL CALC.SUM OF ELEC: 293 MOSM/KG (ref 275–295)
OSMOLALITY SERPL CALC.SUM OF ELEC: 295 MOSM/KG (ref 275–295)
OSMOLALITY SERPL CALC.SUM OF ELEC: 296 MOSM/KG (ref 275–295)
OSMOLALITY SERPL CALC.SUM OF ELEC: 299 MOSM/KG (ref 275–295)
OSMOLALITY SERPL CALC.SUM OF ELEC: 310 MOSM/KG (ref 275–295)
OSMOLALITY SERPL CALC.SUM OF ELEC: 311 MOSM/KG (ref 275–295)
OXYHGB MFR BLDA: 95.9 % (ref 92–100)
OXYHGB MFR BLDA: 96.4 % (ref 92–100)
OXYHGB MFR BLDA: 96.5 % (ref 92–100)
OXYHGB MFR BLDA: 96.7 % (ref 92–100)
OXYHGB MFR BLDA: 97.4 % (ref 92–100)
P AXIS: 44 DEGREES
P AXIS: 46 DEGREES
P-R INTERVAL: 128 MS
P-R INTERVAL: 136 MS
P/F RATIO: 300 MMHG
P/F RATIO: 320 MMHG
PARAINFLUENZA 1 PCR:: NOT DETECTED
PARAINFLUENZA 2 PCR:: NOT DETECTED
PARAINFLUENZA 3 PCR:: NOT DETECTED
PARAINFLUENZA 4 PCR:: NOT DETECTED
PCO2 BLDA: 41 MM HG (ref 35–45)
PCO2 BLDA: 45 MM HG (ref 35–45)
PCO2 BLDA: 47 MM HG (ref 35–45)
PCO2 BLDA: 47 MM HG (ref 35–45)
PCO2 BLDA: 49 MM HG (ref 35–45)
PEEP: 5 CM H2O
PH BLDA: 7.3 [PH] (ref 7.35–7.45)
PH BLDA: 7.31 [PH] (ref 7.35–7.45)
PH BLDA: 7.35 [PH] (ref 7.35–7.45)
PH BLDA: 7.35 [PH] (ref 7.35–7.45)
PH BLDA: 7.38 [PH] (ref 7.35–7.45)
PH UR STRIP.AUTO: 5.5 [PH] (ref 5–8)
PHOSPHATE SERPL-MCNC: 1.9 MG/DL (ref 2.5–4.9)
PHOSPHATE SERPL-MCNC: 3.2 MG/DL (ref 2.5–4.9)
PLATELET # BLD AUTO: 285 10(3)UL (ref 150–450)
PLATELET # BLD AUTO: 304 10(3)UL (ref 150–450)
PLATELET # BLD AUTO: 313 10(3)UL (ref 150–450)
PLATELET # BLD AUTO: 329 10(3)UL (ref 150–450)
PLATELET # BLD AUTO: 333 10(3)UL (ref 150–450)
PLATELET # BLD AUTO: 343 10(3)UL (ref 150–450)
PLATELET # BLD AUTO: 343 10(3)UL (ref 150–450)
PLATELET # BLD AUTO: 345 10(3)UL (ref 150–450)
PLATELET # BLD AUTO: 347 10(3)UL (ref 150–450)
PLATELET # BLD AUTO: 352 10(3)UL (ref 150–450)
PLATELET # BLD AUTO: 371 10(3)UL (ref 150–450)
PLATELET # BLD AUTO: 371 10(3)UL (ref 150–450)
PLATELET # BLD AUTO: 406 10(3)UL (ref 150–450)
PLATELET MORPHOLOGY: NORMAL
PO2 BLDA: 102 MM HG (ref 80–100)
PO2 BLDA: 111 MM HG (ref 80–100)
PO2 BLDA: 120 MM HG (ref 80–100)
PO2 BLDA: 78 MM HG (ref 80–100)
PO2 BLDA: 96 MM HG (ref 80–100)
POTASSIUM SERPL-SCNC: 3.3 MMOL/L (ref 3.5–5.1)
POTASSIUM SERPL-SCNC: 3.4 MMOL/L (ref 3.5–5.1)
POTASSIUM SERPL-SCNC: 3.5 MMOL/L (ref 3.5–5.1)
POTASSIUM SERPL-SCNC: 3.6 MMOL/L (ref 3.5–5.1)
POTASSIUM SERPL-SCNC: 3.7 MMOL/L (ref 3.5–5.1)
POTASSIUM SERPL-SCNC: 3.8 MMOL/L (ref 3.5–5.1)
POTASSIUM SERPL-SCNC: 3.9 MMOL/L (ref 3.5–5.1)
POTASSIUM SERPL-SCNC: 4 MMOL/L (ref 3.5–5.1)
POTASSIUM SERPL-SCNC: 4.1 MMOL/L (ref 3.5–5.1)
POTASSIUM SERPL-SCNC: 4.1 MMOL/L (ref 3.5–5.1)
POTASSIUM SERPL-SCNC: 4.9 MMOL/L (ref 3.5–5.1)
PROCALCITONIN SERPL-MCNC: 0.65 NG/ML (ref ?–0.16)
PROT SERPL-MCNC: 6.6 G/DL (ref 6.4–8.2)
PROT SERPL-MCNC: 6.9 G/DL (ref 6.4–8.2)
PROT SERPL-MCNC: 6.9 G/DL (ref 6.4–8.2)
PROT SERPL-MCNC: 7 G/DL (ref 6.4–8.2)
PROT SERPL-MCNC: 7.7 G/DL (ref 6.4–8.2)
PROT UR STRIP.AUTO-MCNC: 20 MG/DL
PROT UR STRIP.AUTO-MCNC: 30 MG/DL
PROT UR STRIP.AUTO-MCNC: 30 MG/DL
PROTHROMBIN TIME: 13.9 SECONDS (ref 11.6–14.8)
Q-T INTERVAL: 260 MS
Q-T INTERVAL: 312 MS
Q-T INTERVAL: 328 MS
QRS DURATION: 74 MS
QRS DURATION: 82 MS
QRS DURATION: 82 MS
QTC CALCULATION (BEZET): 431 MS
QTC CALCULATION (BEZET): 433 MS
QTC CALCULATION (BEZET): 435 MS
R AXIS: 72 DEGREES
R AXIS: 76 DEGREES
R AXIS: 86 DEGREES
RBC # BLD AUTO: 3.06 X10(6)UL
RBC # BLD AUTO: 3.1 X10(6)UL
RBC # BLD AUTO: 3.32 X10(6)UL
RBC # BLD AUTO: 3.36 X10(6)UL
RBC # BLD AUTO: 3.37 X10(6)UL
RBC # BLD AUTO: 3.37 X10(6)UL
RBC # BLD AUTO: 3.39 X10(6)UL
RBC # BLD AUTO: 3.4 X10(6)UL
RBC # BLD AUTO: 3.4 X10(6)UL
RBC # BLD AUTO: 3.48 X10(6)UL
RBC # BLD AUTO: 3.57 X10(6)UL
RBC # BLD AUTO: 3.58 X10(6)UL
RBC # BLD AUTO: 3.71 X10(6)UL
RBC #/AREA URNS AUTO: >10 /HPF
RBC UR QL AUTO: NEGATIVE
RBC UR QL AUTO: NEGATIVE
RETICS # AUTO: 36.3 X10(3) UL (ref 22.5–147.5)
RETICS/RBC NFR AUTO: 1.1 %
RH BLOOD TYPE: POSITIVE
RHINOVIRUS/ENTERO PCR:: NOT DETECTED
RSV RNA SPEC NAA+PROBE: NEGATIVE
RSV RNA SPEC QL NAA+PROBE: NOT DETECTED
SARS-COV-2 RNA NPH QL NAA+NON-PROBE: NOT DETECTED
SARS-COV-2 RNA RESP QL NAA+PROBE: NOT DETECTED
SODIUM SERPL-SCNC: 124 MMOL/L (ref 136–145)
SODIUM SERPL-SCNC: 126 MMOL/L (ref 136–145)
SODIUM SERPL-SCNC: 126 MMOL/L (ref 136–145)
SODIUM SERPL-SCNC: 127 MMOL/L (ref 136–145)
SODIUM SERPL-SCNC: 128 MMOL/L (ref 136–145)
SODIUM SERPL-SCNC: 129 MMOL/L (ref 136–145)
SODIUM SERPL-SCNC: 130 MMOL/L (ref 136–145)
SODIUM SERPL-SCNC: 134 MMOL/L (ref 136–145)
SODIUM SERPL-SCNC: 136 MMOL/L (ref 136–145)
SODIUM SERPL-SCNC: 139 MMOL/L (ref 136–145)
SODIUM SERPL-SCNC: 140 MMOL/L (ref 136–145)
SODIUM SERPL-SCNC: 141 MMOL/L (ref 136–145)
SODIUM SERPL-SCNC: 142 MMOL/L (ref 136–145)
SODIUM SERPL-SCNC: 144 MMOL/L (ref 136–145)
SODIUM SERPL-SCNC: 145 MMOL/L (ref 136–145)
SODIUM SERPL-SCNC: 146 MMOL/L (ref 136–145)
SODIUM SERPL-SCNC: 146 MMOL/L (ref 136–145)
SODIUM SERPL-SCNC: 15 MMOL/L
SP GR UR STRIP.AUTO: 1.01 (ref 1–1.03)
SP GR UR STRIP.AUTO: 1.02 (ref 1–1.03)
SP GR UR STRIP.AUTO: 1.02 (ref 1–1.03)
T AXIS: -47 DEGREES
T AXIS: 16 DEGREES
T AXIS: 28 DEGREES
TIBC SERPL-MCNC: 347 UG/DL (ref 240–450)
TIDAL VOLUME: 500 ML
TIDAL VOLUME: 500 ML
TOTAL CELLS COUNTED BLD: 100
TRANSFERRIN SERPL-MCNC: 233 MG/DL (ref 200–360)
TTG IGA SER-ACNC: 0.3 U/ML (ref ?–7)
UNIT VOLUME: 350 ML
UNIT VOLUME: 350 ML
UROBILINOGEN UR STRIP.AUTO-MCNC: NORMAL MG/DL
VENT RATE: 16 /MIN
VENTRICULAR RATE: 106 BPM
VENTRICULAR RATE: 115 BPM
VENTRICULAR RATE: 167 BPM
WBC # BLD AUTO: 27.4 X10(3) UL (ref 4–11)
WBC # BLD AUTO: 29.6 X10(3) UL (ref 4–11)
WBC # BLD AUTO: 30.8 X10(3) UL (ref 4–11)
WBC # BLD AUTO: 30.8 X10(3) UL (ref 4–11)
WBC # BLD AUTO: 31.6 X10(3) UL (ref 4–11)
WBC # BLD AUTO: 32.2 X10(3) UL (ref 4–11)
WBC # BLD AUTO: 32.2 X10(3) UL (ref 4–11)
WBC # BLD AUTO: 34.2 X10(3) UL (ref 4–11)
WBC # BLD AUTO: 35.3 X10(3) UL (ref 4–11)
WBC # BLD AUTO: 37 X10(3) UL (ref 4–11)
WBC # BLD AUTO: 37.5 X10(3) UL (ref 4–11)
WBC # BLD AUTO: 38.7 X10(3) UL (ref 4–11)
WBC # BLD AUTO: 50.5 X10(3) UL (ref 4–11)

## 2024-01-01 PROCEDURE — 99233 SBSQ HOSP IP/OBS HIGH 50: CPT | Performed by: STUDENT IN AN ORGANIZED HEALTH CARE EDUCATION/TRAINING PROGRAM

## 2024-01-01 PROCEDURE — 70551 MRI BRAIN STEM W/O DYE: CPT | Performed by: HOSPITALIST

## 2024-01-01 PROCEDURE — 5A09357 ASSISTANCE WITH RESPIRATORY VENTILATION, LESS THAN 24 CONSECUTIVE HOURS, CONTINUOUS POSITIVE AIRWAY PRESSURE: ICD-10-PCS | Performed by: INTERNAL MEDICINE

## 2024-01-01 PROCEDURE — G0316 PROLNG IP/OBS E/M EA ADDL 15 MIN: HCPCS | Performed by: STUDENT IN AN ORGANIZED HEALTH CARE EDUCATION/TRAINING PROGRAM

## 2024-01-01 PROCEDURE — 71045 X-RAY EXAM CHEST 1 VIEW: CPT

## 2024-01-01 PROCEDURE — 76942 ECHO GUIDE FOR BIOPSY: CPT | Performed by: HOSPITALIST

## 2024-01-01 PROCEDURE — 88321 CONSLTJ&REPRT SLD PREP ELSWR: CPT | Performed by: HOSPITALIST

## 2024-01-01 PROCEDURE — 71045 X-RAY EXAM CHEST 1 VIEW: CPT | Performed by: INTERNAL MEDICINE

## 2024-01-01 PROCEDURE — 90792 PSYCH DIAG EVAL W/MED SRVCS: CPT | Performed by: OTHER

## 2024-01-01 PROCEDURE — 76856 US EXAM PELVIC COMPLETE: CPT | Performed by: PHYSICIAN ASSISTANT

## 2024-01-01 PROCEDURE — 71260 CT THORAX DX C+: CPT | Performed by: HOSPITALIST

## 2024-01-01 PROCEDURE — 74177 CT ABD & PELVIS W/CONTRAST: CPT | Performed by: EMERGENCY MEDICINE

## 2024-01-01 PROCEDURE — 93306 TTE W/DOPPLER COMPLETE: CPT

## 2024-01-01 PROCEDURE — 99152 MOD SED SAME PHYS/QHP 5/>YRS: CPT | Performed by: HOSPITALIST

## 2024-01-01 PROCEDURE — 99291 CRITICAL CARE FIRST HOUR: CPT | Performed by: INTERNAL MEDICINE

## 2024-01-01 PROCEDURE — 99232 SBSQ HOSP IP/OBS MODERATE 35: CPT | Performed by: INTERNAL MEDICINE

## 2024-01-01 PROCEDURE — 99291 CRITICAL CARE FIRST HOUR: CPT | Performed by: NURSE PRACTITIONER

## 2024-01-01 PROCEDURE — 47000 NEEDLE BIOPSY OF LIVER PERQ: CPT | Performed by: HOSPITALIST

## 2024-01-01 PROCEDURE — 0DB78ZX EXCISION OF STOMACH, PYLORUS, VIA NATURAL OR ARTIFICIAL OPENING ENDOSCOPIC, DIAGNOSTIC: ICD-10-PCS | Performed by: INTERNAL MEDICINE

## 2024-01-01 PROCEDURE — 99223 1ST HOSP IP/OBS HIGH 75: CPT | Performed by: INTERNAL MEDICINE

## 2024-01-01 PROCEDURE — 70553 MRI BRAIN STEM W/O & W/DYE: CPT | Performed by: HOSPITALIST

## 2024-01-01 PROCEDURE — 76705 ECHO EXAM OF ABDOMEN: CPT | Performed by: HOSPITALIST

## 2024-01-01 PROCEDURE — 99223 1ST HOSP IP/OBS HIGH 75: CPT | Performed by: STUDENT IN AN ORGANIZED HEALTH CARE EDUCATION/TRAINING PROGRAM

## 2024-01-01 PROCEDURE — 88341 IMHCHEM/IMCYTCHM EA ADD ANTB: CPT | Performed by: HOSPITALIST

## 2024-01-01 PROCEDURE — 88307 TISSUE EXAM BY PATHOLOGIST: CPT | Performed by: HOSPITALIST

## 2024-01-01 PROCEDURE — 99497 ADVNCD CARE PLAN 30 MIN: CPT | Performed by: STUDENT IN AN ORGANIZED HEALTH CARE EDUCATION/TRAINING PROGRAM

## 2024-01-01 PROCEDURE — 88342 IMHCHEM/IMCYTCHM 1ST ANTB: CPT | Performed by: HOSPITALIST

## 2024-01-01 PROCEDURE — 93975 VASCULAR STUDY: CPT | Performed by: PHYSICIAN ASSISTANT

## 2024-01-01 PROCEDURE — 30233N1 TRANSFUSION OF NONAUTOLOGOUS RED BLOOD CELLS INTO PERIPHERAL VEIN, PERCUTANEOUS APPROACH: ICD-10-PCS | Performed by: INTERNAL MEDICINE

## 2024-01-01 PROCEDURE — 0FB13ZX EXCISION OF RIGHT LOBE LIVER, PERCUTANEOUS APPROACH, DIAGNOSTIC: ICD-10-PCS | Performed by: RADIOLOGY

## 2024-01-01 PROCEDURE — B030ZZZ MAGNETIC RESONANCE IMAGING (MRI) OF BRAIN: ICD-10-PCS | Performed by: RADIOLOGY

## 2024-01-01 PROCEDURE — 76830 TRANSVAGINAL US NON-OB: CPT | Performed by: PHYSICIAN ASSISTANT

## 2024-01-01 PROCEDURE — 71045 X-RAY EXAM CHEST 1 VIEW: CPT | Performed by: NURSE PRACTITIONER

## 2024-01-01 PROCEDURE — 0DJD8ZZ INSPECTION OF LOWER INTESTINAL TRACT, VIA NATURAL OR ARTIFICIAL OPENING ENDOSCOPIC: ICD-10-PCS | Performed by: INTERNAL MEDICINE

## 2024-01-01 PROCEDURE — 74018 RADEX ABDOMEN 1 VIEW: CPT | Performed by: HOSPITALIST

## 2024-01-01 PROCEDURE — 71046 X-RAY EXAM CHEST 2 VIEWS: CPT | Performed by: EMERGENCY MEDICINE

## 2024-01-01 PROCEDURE — 5A0945A ASSISTANCE WITH RESPIRATORY VENTILATION, 24-96 CONSECUTIVE HOURS, HIGH NASAL FLOW/VELOCITY: ICD-10-PCS | Performed by: INTERNAL MEDICINE

## 2024-01-01 PROCEDURE — 71250 CT THORAX DX C-: CPT | Performed by: HOSPITALIST

## 2024-01-01 RX ORDER — SODIUM CHLORIDE 9 MG/ML
INJECTION, SOLUTION INTRAVENOUS CONTINUOUS
Status: DISCONTINUED | OUTPATIENT
Start: 2024-01-01 | End: 2024-01-01

## 2024-01-01 RX ORDER — POTASSIUM CHLORIDE 20 MEQ/1
40 TABLET, EXTENDED RELEASE ORAL ONCE
Status: COMPLETED | OUTPATIENT
Start: 2024-01-01 | End: 2024-01-01

## 2024-01-01 RX ORDER — FUROSEMIDE 10 MG/ML
INJECTION INTRAMUSCULAR; INTRAVENOUS
Status: COMPLETED
Start: 2024-01-01 | End: 2024-01-01

## 2024-01-01 RX ORDER — TAMSULOSIN HYDROCHLORIDE 0.4 MG/1
0.4 CAPSULE ORAL
Status: DISCONTINUED | OUTPATIENT
Start: 2024-01-01 | End: 2024-01-01

## 2024-01-01 RX ORDER — DEXTROSE MONOHYDRATE 50 MG/ML
INJECTION, SOLUTION INTRAVENOUS CONTINUOUS
Status: DISCONTINUED | OUTPATIENT
Start: 2024-01-01 | End: 2024-01-01

## 2024-01-01 RX ORDER — ONDANSETRON 2 MG/ML
4 INJECTION INTRAMUSCULAR; INTRAVENOUS ONCE
Status: COMPLETED | OUTPATIENT
Start: 2024-01-01 | End: 2024-01-01

## 2024-01-01 RX ORDER — LORAZEPAM 2 MG/ML
1 CONCENTRATE ORAL EVERY 4 HOURS PRN
Status: DISCONTINUED | OUTPATIENT
Start: 2024-01-01 | End: 2024-01-01

## 2024-01-01 RX ORDER — LORAZEPAM 2 MG/ML
1 INJECTION INTRAMUSCULAR EVERY 4 HOURS PRN
Status: DISCONTINUED | OUTPATIENT
Start: 2024-01-01 | End: 2024-01-01

## 2024-01-01 RX ORDER — HYDROMORPHONE HYDROCHLORIDE 1 MG/ML
2 SOLUTION ORAL
Status: DISCONTINUED | OUTPATIENT
Start: 2024-01-01 | End: 2024-01-01

## 2024-01-01 RX ORDER — METOPROLOL TARTRATE 1 MG/ML
INJECTION, SOLUTION INTRAVENOUS
Status: COMPLETED
Start: 2024-01-01 | End: 2024-01-01

## 2024-01-01 RX ORDER — BUTALBITAL, ACETAMINOPHEN AND CAFFEINE 50; 325; 40 MG/1; MG/1; MG/1
1 TABLET ORAL EVERY 4 HOURS PRN
Status: DISCONTINUED | OUTPATIENT
Start: 2024-01-01 | End: 2024-01-01

## 2024-01-01 RX ORDER — SODIUM CHLORIDE, SODIUM LACTATE, POTASSIUM CHLORIDE, CALCIUM CHLORIDE 600; 310; 30; 20 MG/100ML; MG/100ML; MG/100ML; MG/100ML
INJECTION, SOLUTION INTRAVENOUS CONTINUOUS
Status: DISCONTINUED | OUTPATIENT
Start: 2024-01-01 | End: 2024-01-01 | Stop reason: HOSPADM

## 2024-01-01 RX ORDER — SENNOSIDES 8.6 MG
8.6 TABLET ORAL 2 TIMES DAILY
Status: DISCONTINUED | OUTPATIENT
Start: 2024-01-01 | End: 2024-01-01

## 2024-01-01 RX ORDER — BISACODYL 5 MG/1
20 TABLET, DELAYED RELEASE ORAL ONCE
Status: COMPLETED | OUTPATIENT
Start: 2024-01-01 | End: 2024-01-01

## 2024-01-01 RX ORDER — HYDROMORPHONE HYDROCHLORIDE 1 MG/ML
INJECTION, SOLUTION INTRAMUSCULAR; INTRAVENOUS; SUBCUTANEOUS
Status: COMPLETED
Start: 2024-01-01 | End: 2024-01-01

## 2024-01-01 RX ORDER — MIDAZOLAM HYDROCHLORIDE 1 MG/ML
INJECTION INTRAMUSCULAR; INTRAVENOUS
Status: COMPLETED
Start: 2024-01-01 | End: 2024-01-01

## 2024-01-01 RX ORDER — NALOXONE HYDROCHLORIDE 0.4 MG/ML
80 INJECTION, SOLUTION INTRAMUSCULAR; INTRAVENOUS; SUBCUTANEOUS AS NEEDED
Status: DISCONTINUED | OUTPATIENT
Start: 2024-01-01 | End: 2024-01-01 | Stop reason: HOSPADM

## 2024-01-01 RX ORDER — MIDAZOLAM HYDROCHLORIDE 1 MG/ML
1 INJECTION INTRAMUSCULAR; INTRAVENOUS EVERY 5 MIN PRN
Status: ACTIVE | OUTPATIENT
Start: 2024-01-01 | End: 2024-01-01

## 2024-01-01 RX ORDER — FUROSEMIDE 10 MG/ML
40 INJECTION INTRAMUSCULAR; INTRAVENOUS ONCE
Status: COMPLETED | OUTPATIENT
Start: 2024-01-01 | End: 2024-01-01

## 2024-01-01 RX ORDER — LORAZEPAM 2 MG/ML
0.25 INJECTION INTRAMUSCULAR ONCE
Status: COMPLETED | OUTPATIENT
Start: 2024-01-01 | End: 2024-01-01

## 2024-01-01 RX ORDER — ACETAMINOPHEN 500 MG
500 TABLET ORAL EVERY 4 HOURS PRN
Status: DISCONTINUED | OUTPATIENT
Start: 2024-01-01 | End: 2024-01-01

## 2024-01-01 RX ORDER — ONDANSETRON 2 MG/ML
4 INJECTION INTRAMUSCULAR; INTRAVENOUS EVERY 6 HOURS PRN
Status: DISCONTINUED | OUTPATIENT
Start: 2024-01-01 | End: 2024-01-01

## 2024-01-01 RX ORDER — BENZONATATE 100 MG/1
100 CAPSULE ORAL EVERY 8 HOURS
Status: DISCONTINUED | OUTPATIENT
Start: 2024-01-01 | End: 2024-01-01

## 2024-01-01 RX ORDER — FLUMAZENIL 0.1 MG/ML
INJECTION INTRAVENOUS
Status: DISCONTINUED
Start: 2024-01-01 | End: 2024-01-01 | Stop reason: WASHOUT

## 2024-01-01 RX ORDER — GADOTERATE MEGLUMINE 376.9 MG/ML
20 INJECTION INTRAVENOUS
Status: COMPLETED | OUTPATIENT
Start: 2024-01-01 | End: 2024-01-01

## 2024-01-01 RX ORDER — HYDROMORPHONE HYDROCHLORIDE 1 MG/ML
0.8 INJECTION, SOLUTION INTRAMUSCULAR; INTRAVENOUS; SUBCUTANEOUS EVERY 2 HOUR PRN
Status: DISCONTINUED | OUTPATIENT
Start: 2024-01-01 | End: 2024-01-01

## 2024-01-01 RX ORDER — FUROSEMIDE 10 MG/ML
20 INJECTION INTRAMUSCULAR; INTRAVENOUS ONCE
Status: DISCONTINUED | OUTPATIENT
Start: 2024-01-01 | End: 2024-01-01

## 2024-01-01 RX ORDER — BENZONATATE 100 MG/1
100 CAPSULE ORAL 3 TIMES DAILY PRN
Status: DISCONTINUED | OUTPATIENT
Start: 2024-01-01 | End: 2024-01-01

## 2024-01-01 RX ORDER — HYDROMORPHONE HYDROCHLORIDE 1 MG/ML
0.4 INJECTION, SOLUTION INTRAMUSCULAR; INTRAVENOUS; SUBCUTANEOUS EVERY 5 MIN PRN
Status: DISCONTINUED | OUTPATIENT
Start: 2024-01-01 | End: 2024-01-01 | Stop reason: HOSPADM

## 2024-01-01 RX ORDER — TOLVAPTAN 15 MG/1
15 TABLET ORAL ONCE
Status: COMPLETED | OUTPATIENT
Start: 2024-01-01 | End: 2024-01-01

## 2024-01-01 RX ORDER — HYDROMORPHONE HYDROCHLORIDE 1 MG/ML
0.2 INJECTION, SOLUTION INTRAMUSCULAR; INTRAVENOUS; SUBCUTANEOUS EVERY 5 MIN PRN
Status: DISCONTINUED | OUTPATIENT
Start: 2024-01-01 | End: 2024-01-01 | Stop reason: HOSPADM

## 2024-01-01 RX ORDER — SODIUM CHLORIDE, SODIUM LACTATE, POTASSIUM CHLORIDE, CALCIUM CHLORIDE 600; 310; 30; 20 MG/100ML; MG/100ML; MG/100ML; MG/100ML
INJECTION, SOLUTION INTRAVENOUS CONTINUOUS
Status: DISCONTINUED | OUTPATIENT
Start: 2024-01-01 | End: 2024-01-01

## 2024-01-01 RX ORDER — HYDROCODONE BITARTRATE AND ACETAMINOPHEN 5; 325 MG/1; MG/1
2 TABLET ORAL EVERY 4 HOURS PRN
Status: DISCONTINUED | OUTPATIENT
Start: 2024-01-01 | End: 2024-01-01

## 2024-01-01 RX ORDER — HYDROCODONE BITARTRATE AND ACETAMINOPHEN 5; 325 MG/1; MG/1
1 TABLET ORAL EVERY 4 HOURS PRN
Status: DISCONTINUED | OUTPATIENT
Start: 2024-01-01 | End: 2024-01-01

## 2024-01-01 RX ORDER — HYDROMORPHONE HYDROCHLORIDE 1 MG/ML
1.2 INJECTION, SOLUTION INTRAMUSCULAR; INTRAVENOUS; SUBCUTANEOUS EVERY 2 HOUR PRN
Status: DISCONTINUED | OUTPATIENT
Start: 2024-01-01 | End: 2024-01-01

## 2024-01-01 RX ORDER — IPRATROPIUM BROMIDE AND ALBUTEROL SULFATE 2.5; .5 MG/3ML; MG/3ML
3 SOLUTION RESPIRATORY (INHALATION) EVERY 6 HOURS PRN
Status: DISCONTINUED | OUTPATIENT
Start: 2024-01-01 | End: 2024-01-01

## 2024-01-01 RX ORDER — GLYCOPYRROLATE 0.2 MG/ML
0.4 INJECTION, SOLUTION INTRAMUSCULAR; INTRAVENOUS
Status: DISCONTINUED | OUTPATIENT
Start: 2024-01-01 | End: 2024-01-01

## 2024-01-01 RX ORDER — ACETAMINOPHEN 325 MG/1
650 TABLET ORAL EVERY 8 HOURS
Status: DISCONTINUED | OUTPATIENT
Start: 2024-01-01 | End: 2024-01-01

## 2024-01-01 RX ORDER — HYDROMORPHONE HYDROCHLORIDE 1 MG/ML
0.6 INJECTION, SOLUTION INTRAMUSCULAR; INTRAVENOUS; SUBCUTANEOUS EVERY 5 MIN PRN
Status: DISCONTINUED | OUTPATIENT
Start: 2024-01-01 | End: 2024-01-01 | Stop reason: HOSPADM

## 2024-01-01 RX ORDER — HYDROCODONE BITARTRATE AND ACETAMINOPHEN 5; 325 MG/1; MG/1
1 TABLET ORAL ONCE AS NEEDED
Status: DISCONTINUED | OUTPATIENT
Start: 2024-01-01 | End: 2024-01-01 | Stop reason: HOSPADM

## 2024-01-01 RX ORDER — FUROSEMIDE 10 MG/ML
20 INJECTION INTRAMUSCULAR; INTRAVENOUS ONCE
Status: COMPLETED | OUTPATIENT
Start: 2024-01-01 | End: 2024-01-01

## 2024-01-01 RX ORDER — HYDROMORPHONE HYDROCHLORIDE 1 MG/ML
0.2 INJECTION, SOLUTION INTRAMUSCULAR; INTRAVENOUS; SUBCUTANEOUS EVERY 2 HOUR PRN
Status: DISCONTINUED | OUTPATIENT
Start: 2024-01-01 | End: 2024-01-01

## 2024-01-01 RX ORDER — GUAIFENESIN 600 MG/1
600 TABLET, EXTENDED RELEASE ORAL 2 TIMES DAILY
Status: DISCONTINUED | OUTPATIENT
Start: 2024-01-01 | End: 2024-01-01

## 2024-01-01 RX ORDER — FLUMAZENIL 0.1 MG/ML
0.2 INJECTION INTRAVENOUS AS NEEDED
Status: DISCONTINUED | OUTPATIENT
Start: 2024-01-01 | End: 2024-01-01 | Stop reason: HOSPADM

## 2024-01-01 RX ORDER — ENOXAPARIN SODIUM 100 MG/ML
40 INJECTION SUBCUTANEOUS EVERY 24 HOURS
Status: DISCONTINUED | OUTPATIENT
Start: 2024-01-01 | End: 2024-01-01

## 2024-01-01 RX ORDER — ACETAMINOPHEN 10 MG/ML
1000 INJECTION, SOLUTION INTRAVENOUS EVERY 8 HOURS
Status: DISCONTINUED | OUTPATIENT
Start: 2024-01-01 | End: 2024-01-01

## 2024-01-01 RX ORDER — LACTULOSE 10 G/15ML
20 SOLUTION ORAL 3 TIMES DAILY
Status: DISCONTINUED | OUTPATIENT
Start: 2024-01-01 | End: 2024-01-01

## 2024-01-01 RX ORDER — HYDROMORPHONE HYDROCHLORIDE 1 MG/ML
0.4 INJECTION, SOLUTION INTRAMUSCULAR; INTRAVENOUS; SUBCUTANEOUS EVERY 2 HOUR PRN
Status: DISCONTINUED | OUTPATIENT
Start: 2024-01-01 | End: 2024-01-01

## 2024-01-01 RX ORDER — ACETAMINOPHEN 500 MG
1000 TABLET ORAL ONCE AS NEEDED
Status: DISCONTINUED | OUTPATIENT
Start: 2024-01-01 | End: 2024-01-01 | Stop reason: HOSPADM

## 2024-01-01 RX ORDER — LORAZEPAM 1 MG/1
1 TABLET ORAL EVERY 4 HOURS PRN
Status: DISCONTINUED | OUTPATIENT
Start: 2024-01-01 | End: 2024-01-01

## 2024-01-01 RX ORDER — SODIUM CHLORIDE 9 MG/ML
INJECTION, SOLUTION INTRAVENOUS ONCE
Status: COMPLETED | OUTPATIENT
Start: 2024-01-01 | End: 2024-01-01

## 2024-01-01 RX ORDER — METOPROLOL TARTRATE 1 MG/ML
5 INJECTION, SOLUTION INTRAVENOUS ONCE
Status: COMPLETED | OUTPATIENT
Start: 2024-01-01 | End: 2024-01-01

## 2024-01-01 RX ORDER — ONDANSETRON 2 MG/ML
4 INJECTION INTRAMUSCULAR; INTRAVENOUS EVERY 6 HOURS PRN
Status: DISCONTINUED | OUTPATIENT
Start: 2024-01-01 | End: 2024-01-01 | Stop reason: HOSPADM

## 2024-01-01 RX ORDER — KETOROLAC TROMETHAMINE 15 MG/ML
15 INJECTION, SOLUTION INTRAMUSCULAR; INTRAVENOUS EVERY 6 HOURS PRN
Status: DISPENSED | OUTPATIENT
Start: 2024-01-01 | End: 2024-01-01

## 2024-01-01 RX ORDER — SODIUM BICARBONATE 325 MG/1
325 TABLET ORAL AS NEEDED
Status: DISCONTINUED | OUTPATIENT
Start: 2024-01-01 | End: 2024-01-01

## 2024-01-01 RX ORDER — ACETAMINOPHEN 500 MG
1000 TABLET ORAL ONCE
Status: COMPLETED | OUTPATIENT
Start: 2024-01-01 | End: 2024-01-01

## 2024-01-01 RX ORDER — IBUPROFEN 400 MG/1
400 TABLET ORAL EVERY 6 HOURS PRN
COMMUNITY

## 2024-01-01 RX ORDER — MIDAZOLAM HYDROCHLORIDE 1 MG/ML
0.5 INJECTION INTRAMUSCULAR; INTRAVENOUS ONCE
Status: COMPLETED | OUTPATIENT
Start: 2024-01-01 | End: 2024-01-01

## 2024-01-01 RX ORDER — BUSPIRONE HYDROCHLORIDE 5 MG/1
5 TABLET ORAL 2 TIMES DAILY
Status: DISCONTINUED | OUTPATIENT
Start: 2024-01-01 | End: 2024-01-01

## 2024-01-01 RX ORDER — SENNOSIDES 8.6 MG
8.6 TABLET ORAL 2 TIMES DAILY PRN
Status: DISCONTINUED | OUTPATIENT
Start: 2024-01-01 | End: 2024-01-01

## 2024-01-01 RX ORDER — LORAZEPAM 0.5 MG/1
0.5 TABLET ORAL ONCE AS NEEDED
Status: COMPLETED | OUTPATIENT
Start: 2024-01-01 | End: 2024-01-01

## 2024-01-01 RX ORDER — HALOPERIDOL 5 MG/ML
1 INJECTION INTRAMUSCULAR ONCE
Status: COMPLETED | OUTPATIENT
Start: 2024-01-01 | End: 2024-01-01

## 2024-01-01 RX ORDER — DEXTROSE AND SODIUM CHLORIDE 5; .45 G/100ML; G/100ML
INJECTION, SOLUTION INTRAVENOUS CONTINUOUS
Status: DISCONTINUED | OUTPATIENT
Start: 2024-01-01 | End: 2024-01-01

## 2024-01-01 RX ORDER — POTASSIUM CHLORIDE 1.5 G/1.58G
40 POWDER, FOR SOLUTION ORAL ONCE
Status: COMPLETED | OUTPATIENT
Start: 2024-01-01 | End: 2024-01-01

## 2024-01-01 RX ORDER — TAMSULOSIN HYDROCHLORIDE 0.4 MG/1
0.4 CAPSULE ORAL NIGHTLY
Status: DISCONTINUED | OUTPATIENT
Start: 2024-01-01 | End: 2024-01-01

## 2024-01-01 RX ORDER — NALOXONE HYDROCHLORIDE 0.4 MG/ML
0.08 INJECTION, SOLUTION INTRAMUSCULAR; INTRAVENOUS; SUBCUTANEOUS AS NEEDED
Status: DISCONTINUED | OUTPATIENT
Start: 2024-01-01 | End: 2024-01-01 | Stop reason: HOSPADM

## 2024-01-01 RX ORDER — PROCHLORPERAZINE EDISYLATE 5 MG/ML
5 INJECTION INTRAMUSCULAR; INTRAVENOUS EVERY 8 HOURS PRN
Status: DISCONTINUED | OUTPATIENT
Start: 2024-01-01 | End: 2024-01-01

## 2024-01-01 RX ORDER — HYDROMORPHONE HYDROCHLORIDE 2 MG/1
2 TABLET ORAL EVERY 4 HOURS PRN
Status: CANCELLED | OUTPATIENT
Start: 2024-01-01

## 2024-01-01 RX ORDER — LORAZEPAM 1 MG/1
1 TABLET ORAL EVERY 6 HOURS PRN
Status: DISCONTINUED | OUTPATIENT
Start: 2024-01-01 | End: 2024-01-01

## 2024-01-01 RX ORDER — HYDROCODONE BITARTRATE AND ACETAMINOPHEN 5; 325 MG/1; MG/1
2 TABLET ORAL ONCE AS NEEDED
Status: DISCONTINUED | OUTPATIENT
Start: 2024-01-01 | End: 2024-01-01 | Stop reason: HOSPADM

## 2024-01-01 RX ORDER — LORAZEPAM 0.5 MG/1
0.5 TABLET ORAL EVERY 4 HOURS PRN
Status: DISCONTINUED | OUTPATIENT
Start: 2024-01-01 | End: 2024-01-01

## 2024-01-01 RX ORDER — NALOXONE HYDROCHLORIDE 0.4 MG/ML
0.08 INJECTION, SOLUTION INTRAMUSCULAR; INTRAVENOUS; SUBCUTANEOUS
Status: DISCONTINUED | OUTPATIENT
Start: 2024-01-01 | End: 2024-01-01

## 2024-01-01 RX ORDER — HALOPERIDOL 5 MG/ML
2 INJECTION INTRAMUSCULAR EVERY 4 HOURS PRN
Status: DISCONTINUED | OUTPATIENT
Start: 2024-01-01 | End: 2024-01-01

## 2024-01-01 RX ORDER — PROCHLORPERAZINE EDISYLATE 5 MG/ML
5 INJECTION INTRAMUSCULAR; INTRAVENOUS EVERY 8 HOURS PRN
Status: DISCONTINUED | OUTPATIENT
Start: 2024-01-01 | End: 2024-01-01 | Stop reason: HOSPADM

## 2024-01-01 RX ORDER — LORAZEPAM 2 MG/ML
1 INJECTION INTRAMUSCULAR ONCE
Status: COMPLETED | OUTPATIENT
Start: 2024-01-01 | End: 2024-01-01

## 2024-01-01 RX ORDER — HALOPERIDOL 5 MG/ML
2 INJECTION INTRAMUSCULAR ONCE AS NEEDED
Status: COMPLETED | OUTPATIENT
Start: 2024-01-01 | End: 2024-01-01

## 2024-01-01 RX ORDER — NALOXONE HYDROCHLORIDE 0.4 MG/ML
INJECTION, SOLUTION INTRAMUSCULAR; INTRAVENOUS; SUBCUTANEOUS
Status: DISCONTINUED
Start: 2024-01-01 | End: 2024-01-01 | Stop reason: WASHOUT

## 2024-01-01 RX ORDER — QUETIAPINE FUMARATE 25 MG/1
25 TABLET, FILM COATED ORAL NIGHTLY
Status: DISCONTINUED | OUTPATIENT
Start: 2024-01-01 | End: 2024-01-01

## 2024-01-01 RX ORDER — HYDROXYZINE HYDROCHLORIDE 25 MG/1
25 TABLET, FILM COATED ORAL 3 TIMES DAILY PRN
Status: DISCONTINUED | OUTPATIENT
Start: 2024-01-01 | End: 2024-01-01

## 2024-01-01 RX ORDER — RUFINAMIDE 40 MG/ML
1 SUSPENSION ORAL DAILY
Status: DISCONTINUED | OUTPATIENT
Start: 2024-01-01 | End: 2024-01-01

## 2024-01-01 RX ORDER — ACETAMINOPHEN 325 MG/1
650 TABLET ORAL ONCE
Status: DISCONTINUED | OUTPATIENT
Start: 2024-01-01 | End: 2024-01-01

## 2024-01-08 PROBLEM — D72.829 LEUKOCYTOSIS, UNSPECIFIED TYPE: Status: ACTIVE | Noted: 2024-01-01

## 2024-01-08 PROBLEM — E87.1 HYPONATREMIA: Status: ACTIVE | Noted: 2024-01-01

## 2024-01-08 PROBLEM — E87.1 HYPONATREMIA: Status: ACTIVE | Noted: 2024-01-08

## 2024-01-08 PROBLEM — D72.829 LEUKOCYTOSIS, UNSPECIFIED TYPE: Status: ACTIVE | Noted: 2024-01-08

## 2024-01-08 PROBLEM — D64.9 ANEMIA, UNSPECIFIED TYPE: Status: ACTIVE | Noted: 2024-01-01

## 2024-01-08 PROBLEM — D64.9 ANEMIA, UNSPECIFIED TYPE: Status: ACTIVE | Noted: 2024-01-08

## 2024-01-08 PROBLEM — C79.9 METASTATIC MALIGNANT NEOPLASM, UNSPECIFIED SITE (HCC): Status: ACTIVE | Noted: 2024-01-08

## 2024-01-08 PROBLEM — E87.6 HYPOKALEMIA: Status: ACTIVE | Noted: 2024-01-01

## 2024-01-08 PROBLEM — C79.9 METASTATIC MALIGNANT NEOPLASM, UNSPECIFIED SITE (HCC): Status: ACTIVE | Noted: 2024-01-01

## 2024-01-08 PROBLEM — E87.6 HYPOKALEMIA: Status: ACTIVE | Noted: 2024-01-08

## 2024-01-08 NOTE — H&P
[facility]    History and Physical     Alysia Campos Patient Status:  Emergency    7/15/1970 MRN [id number]   Location [facility] EMERGENCY DEPARTMENT Attending Harshal Aponte MD   Hosp Day # 0 PCP Renetta Santos DO     Chief Complaint:   Chief Complaint   Patient presents with    Difficulty Breathing    Nausea/Vomiting/Diarrhea    Anemia       History of Present Illness: Alysia Campos is a 53 year old female with hx anemia who presented to the ED for further evaluation of cough, progressive fatigue/CAREY x1 week and N/V/D x 2 days. She  was directed to the ED by her PCP for further evaluation.     On arrival to the ED she was afebrile & hemodynamically stable. SpO2 97% on room air. Labs notable for WBC 31, Hgb 7.0, Na 126, . CT Abd/Pelvis was obtained which showed innumerable metastatic lesions in the liver and bilateral lung base, with omental base mass in the RLQ and a left ovarian mass measuring 7.8cm. She was given 1L NS and Gynecologic Oncology was consulted.     Following admission she denies any new or worsening symptoms. On ROS she endorses significant menorrhagia x1 year with resultant anemia.     Past Medical History:  Past Medical History:   Diagnosis Date    Anemia         Past Surgical History: History reviewed. No pertinent surgical history.    Social History:  reports that she has never smoked. She has never used smokeless tobacco. She reports that she does not currently use alcohol. She reports that she does not use drugs.    Family History: No family history on file.    Allergies: Not on File    Medications:    No current facility-administered medications on file prior to encounter.     No current outpatient medications on file prior to encounter.       Review of Systems:   A comprehensive 14 point review of systems was completed.    Pertinent positives and negatives noted in the HPI.    Physical Exam:    /88   Pulse 106   Resp (!) 27   Ht 5' 2\" (1.575 m)   Wt 165 lb  (74.8 kg)   LMP  (LMP Unknown)   SpO2 97%   BMI 30.18 kg/m²     General: No acute distress. Comfortable, nontoxic Appears pale  HEENT: Normocephalic atraumatic. Moist mucous membranes; Sclera anicteric.  Neck: Supple, +JVD  Respiratory: Diminished at bilateral bases, otherwise CTA; ;reg resp rate & effort, no wheezes/crackles   Cardiovascular: S1, S2. Regular rate and rhythm. No murmurs appreciable   Chest and Back: No tenderness or deformity.  Abdomen:  mildly distended, +tender mobile mass in LLQ; +hepatomegaly; no ascites, no rebound/guarding  Neurologic: No focal neurological deficits. CNII-XII grossly intact.  Musculoskeletal: Moves all extremities.  Extremities: No edema or cyanosis.  Integument: No rashes or lesions.   Psychiatric: Appropriate mood and affect.      Diagnostic Data:      Labs:  Recent Labs   Lab 01/08/24  0954   WBC 31.6*   HGB 7.0*   MCV 66.4*   .0   BAND 1       Recent Labs   Lab 01/08/24  0954   *   BUN 12   CREATSERUM 0.60   CA 9.1   ALB 3.6   *   K 3.5   CL 92*   CO2 23.0   ALKPHO 294*   AST 75*   ALT 36   BILT 0.8   TP 7.7       Estimated Creatinine Clearance: 85.8 mL/min (based on SCr of 0.6 mg/dL).    No results for input(s): \"PTP\", \"INR\" in the last 168 hours.    No results for input(s): \"TROP\", \"CK\" in the last 168 hours.    Imaging: Imaging data reviewed in Epic.      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   -- CT Abd w/ multiple pulmonary, hepatic metastases, omental mass, 7.8cm left ovarian mass  -- Check CT Chest to complete staging   -- Gyn Onc consulted, plan discussed, rec  Onc consult  -- IR consulted for CT Guided biopsy of mass; NPO at midnight, hold dvt chemoppx     Hyponatremia  -- likely hypovolemic in the setting of poor PO intake, nausea/vomiting  -- Na 126 on admission, s/p 1L NS bolus  -- cont NS at 150cc/h, check q4h BMP to ensure correction no faster than 0.5 mEq/h    Acute on Chronic Anemia   -- recent baseline Hgb 7.7-8.0, 7.0 on admission  -- no e/o active bleeding or hemodynamic instability  -- given trend will transfuse 1 u pRBC  -- check iron studies, venofer if deficient     DVT Mechanical Prophylaxis:   SCDs,   no chemoppx given anemia, anticipated procedures          Code Status: FULL     Dispo: inpatient; EVA > 2 midnights     Plan of care discussed with patient and/or family at bedside.    LINDSAY Aponte MD  [facility]   [phone]

## 2024-01-08 NOTE — ED PROVIDER NOTES
Patient Seen in: [facility] Emergency Department      History     Chief Complaint   Patient presents with    Difficulty Breathing    Nausea/Vomiting/Diarrhea    Anemia     Stated Complaint: shortness of breath and vomiting/diarrhea for last 2 days, anemia hgb 7.7    Subjective:   HPI    Patient comes to the emergency department with symptoms of a mild cough over the past week and now vomiting and diarrhea over the past 2 days.  The patient states that she is somewhat short of breath, but is always somewhat short of breath because of her quite significant anemia which has been an ongoing problem for her after she had substantial volumes of vaginal bleeding last year.  She describes no new bleeding.  She states that she has been vomiting and having diarrhea approximately every 3 hours over the past 24 hours.  She states that she is unable to tolerate any oral intake.  There is no blood in either her emesis or her watery stools.  She has no abdominal pain.  She has no fever or chills.    Objective:   Past Medical History:   Diagnosis Date    Anemia               History reviewed. No pertinent surgical history.             Social History     Socioeconomic History    Marital status:    Tobacco Use    Smoking status: Never    Smokeless tobacco: Never   Vaping Use    Vaping Use: Never used   Substance and Sexual Activity    Alcohol use: Not Currently    Drug use: Never              Review of Systems    Positive for stated complaint: shortness of breath and vomiting/diarrhea for last 2 days, anemia hgb 7.7  Other systems are as noted in HPI.  Constitutional and vital signs reviewed.      All other systems reviewed and negative except as noted above.    Physical Exam     ED Triage Vitals [01/08/24 0946]   BP (!) 163/89   Pulse 105   Resp (!) 32   Temp    Temp src    SpO2 97 %   O2 Device None (Room air)       Current:BP (!) 162/79   Pulse 111   Resp (!) 32   Ht 157.5 cm (5' 2\")   Wt 74.8 kg   LMP  (LMP  Unknown)   SpO2 95%   BMI 30.18 kg/m²         Physical Exam  Vitals and nursing note reviewed.   Constitutional:       Appearance: She is well-developed.   HENT:      Head: Normocephalic.   Cardiovascular:      Rate and Rhythm: Regular rhythm. Tachycardia present.      Pulses:           Radial pulses are 2+ on the right side and 2+ on the left side.        Dorsalis pedis pulses are 2+ on the right side and 2+ on the left side.        Posterior tibial pulses are 2+ on the right side and 2+ on the left side.      Heart sounds: Normal heart sounds. No murmur heard.  Pulmonary:      Effort: Pulmonary effort is normal. No respiratory distress.      Breath sounds: Normal breath sounds. No decreased breath sounds or wheezing.      Comments: No current active coughing.  Abdominal:      General: Bowel sounds are increased.      Palpations: Abdomen is soft. There is hepatomegaly.      Tenderness: There is no rebound.      Comments: Mildly tender hepatomegaly noted. No peritoneal findings.   Musculoskeletal:         General: No tenderness. Normal range of motion.      Cervical back: Normal range of motion and neck supple.   Lymphadenopathy:      Cervical: No cervical adenopathy.   Skin:     General: Skin is warm and dry.      Findings: No rash.   Neurological:      Mental Status: She is alert and oriented to person, place, and time.      Sensory: No sensory deficit.              ED Course     Labs Reviewed   COMP METABOLIC PANEL (14) - Abnormal; Notable for the following components:       Result Value    Glucose 106 (*)     Sodium 124 (*)     Chloride 92 (*)     Calculated Osmolality 258 (*)     AST 75 (*)     Alkaline Phosphatase 294 (*)     A/G Ratio 0.9 (*)     All other components within normal limits   MANUAL DIFFERENTIAL - Abnormal; Notable for the following components:    Neutrophil Absolute Manual 26.86 (*)     Monocyte Absolute Manual 1.90 (*)     Basophil Absolute Manual 0.95 (*)     Metamyelocyte Absolute Manual  0.32 (*)     All other components within normal limits   URINALYSIS WITH CULTURE REFLEX - Abnormal; Notable for the following components:    Ketones Urine 10 (*)     Protein Urine 20 (*)     All other components within normal limits   CBC W/ DIFFERENTIAL - Abnormal; Notable for the following components:    WBC 31.6 (*)     RBC 3.48 (*)     HGB 7.0 (*)     HCT 23.1 (*)     MCV 66.4 (*)     MCH 20.1 (*)     MCHC 30.3 (*)     Neutrophil Absolute Prelim 25.08 (*)     All other components within normal limits   SARS-COV-2/FLU A AND B/RSV BY PCR (GENEXPERT) - Normal    Narrative:     This test is intended for the qualitative detection and differentiation of SARS-CoV-2, influenza A, influenza B, and respiratory syncytial virus (RSV) viral RNA in nasopharyngeal or nares swabs from individuals suspected of respiratory viral infection consistent with COVID-19 by their healthcare provider. Signs and symptoms of respiratory viral infection due to SARS-CoV-2, influenza, and RSV can be similar.    Test performed using the Xpert Xpress SARS-CoV-2/FLU/RSV (real time RT-PCR)  assay on the [employer] instrument, [address], [place] [address].   This test is being used under the Food and Drug Administration's Emergency Use Authorization.    The authorized Fact Sheet for Healthcare Providers for this assay is available upon request from the laboratory.   CBC WITH DIFFERENTIAL WITH PLATELET    Narrative:     The following orders were created for panel order CBC With Differential With Platelet.  Procedure                               Abnormality         Status                     ---------                               -----------         ------                     CBC W/ DIFFERENTIAL[[id number]]          Abnormal            Final result                 Please view results for these tests on the individual orders.   PATH COMMENT CBC   BASIC METABOLIC PANEL (8)   RAINBOW DRAW LAVENDER   RAINBOW DRAW LIGHT GREEN   RAINBOW DRAW BLUE           ED Course as of 01/08/24 1319  ------------------------------------------------------------  Time: 01/08 1038  Value: XR CHEST PA + LAT CHEST (OTG=[id number])  Comment: Images were independently viewed by me and no infiltrate was noted.  ------------------------------------------------------------  Time: 01/08 1039  Value: WBC(!): 31.6  Comment: (Reviewed)  ------------------------------------------------------------  Time: 01/08 1039  Value: Hemoglobin(!): 7.0  Comment: Patient's hemoglobin has been approximately 7 in the past.  ------------------------------------------------------------  Time: 01/08 1114  Value: Nitrite Urine: Negative  Comment: (Reviewed)  ------------------------------------------------------------  Time: 01/08 1114  Value: Leukocyte Esterase : Negative  Comment: (Reviewed)  ------------------------------------------------------------  Time: 01/08 1114  Value: Blood Urine: Negative  Comment: (Reviewed)  ------------------------------------------------------------  Time: 01/08 1114  Value: Sodium(!): 124  Comment: (Reviewed)  ------------------------------------------------------------  Time: 01/08 1210  Value: CT ABDOMEN PELVIS IV CONTRAST, NO ORAL (ER)  Comment: Multiple metastatic lesions in liver, peritoneum and lower portions of the lung.              MDM      Patient comes in the emergency department with cough, diarrhea and vomiting.  Patient's differential included pneumonia and acute viral infection.  However, patient's workup revealed significant hyponatremia which appears to be acute along with significant leukocytosis.  Patient's abdominal exam also revealed quite significant hepatomegaly.  Because of these findings, CT scan was performed to evaluate for acute intra-abdominal process such as mass or abscess.  The CT scan revealed findings suggestive of metastatic disease diffusely throughout the entire abdomen.  Of note, a possible ovarian mass was noted.  Patient was hospitalized for  acute treatment of her hyponatremia.  Gynecologic oncology was also consulted.  Admission disposition: 1/8/2024 12:35 PM                                        Medical Decision Making      Disposition and Plan     Clinical Impression:  1. Metastatic malignant neoplasm, unspecified site (HCC)    2. Hyponatremia    3. Leukocytosis, unspecified type    4. Anemia, unspecified type         Disposition:  Admit  1/8/2024 12:35 pm    Follow-up:  No follow-up provider specified.        Medications Prescribed:  There are no discharge medications for this patient.                        Hospital Problems       Present on Admission             ICD-10-CM Noted POA    * (Principal) Metastatic malignant neoplasm, unspecified site (HCC) C79.9 1/8/2024 Unknown    Hypokalemia E87.6 1/8/2024 Yes

## 2024-01-08 NOTE — ED QUICK NOTES
Orders for admission, patient is aware of plan and ready to go upstairs. Any questions, please call ED RN Anita at extension [phone].     Patient Covid vaccination status: Unvaccinated     COVID Test Ordered in ED: SARS-CoV-2/Flu A and B/RSV by PCR (GeneXpert)    COVID Suspicion at Admission: N/A    Running Infusions:  0.9 NaCl infusing at 125ml/hr    Mental Status/LOC at time of transport: a/ox4    Other pertinent information: none  CIWA score: N/A   NIH score:  N/A

## 2024-01-08 NOTE — PLAN OF CARE
Received pt alert and oriented X4. VSS. Orthostatics done, not positive, see flowsheets. Med rec and navigators completed. Pt NPO at midnight for biopsy omental and liver per MD. IVF started. IV lasix given. Pt up at [facility]. PT/INR ordered for morning labs. Need stool sample. Pt also getting 1 unit of blood, awaiting type and screen. Fall precautions in place, call light in reach.         NURSING ADMISSION NOTE      Patient admitted via Cart  Oriented to room.  Safety precautions initiated.  Bed in low position.  Call light in reach.

## 2024-01-08 NOTE — PROGRESS NOTES
01/08/24 1401   Clinical Encounter Type   Visited With Health care provider;Patient   Routine Visit Introduction  (Responded to page)   Patient's Supportive Strategies/Resources Family support: daughter in [place], son in [place] and a minor daughter (17yo) at home   Buddhism Encounters   Buddhism Needs Prayer   Taxonomy   Intended Effects Convey a calming presence   Methods Encourage self care;Encourage self reflection;Encourage sharing of feelings;Offer spiritual/Scientologist support;Offer emotional support   Interventions Acknowledge current situation;Active listening;Explain  role;Identify supportive relationship(s);[place];Share words of hope and inspiration      checked in w/ the RN and MD. Introduced self to Alysia. Listened compassionately to her story. Expressed her feelings. Acknowledged her situation/condition. Explored her family/jayne/friend support. Respected and supported. Introduced and shared some spiritual coping practices. Per request, prayed and shared words of blessings.  Promoted a sense of inner peace, comfort and understanding.  established a supportive/caring relationship.    Also, provided Spiritual Care card w/ direct 24/7  contact information.    Spiritual Care support can be requested via an Epic consult.    For urgent/immediate needs, please contact the On Call  at ext. [phone].  Rev. Erick Goldstein M.Div., M.T.S., APBCC., BCPC., CGCS.  Spiritual Care Lead

## 2024-01-08 NOTE — IMAGING NOTE
Floor RN - Kyler notified to keep patient NPO for procedure.  Hold all blood thinners, saline lock IV, patient consentable, tentative procedure time 1/9 1100.  Draw PT INR in AM.  RN to clarify that biopsy of liver is sufficient to obtain what they need.  There is a CT liver biopsy ordered, but OBGYN ordered US biopsy of omental mass.  RN verbalized understanding.

## 2024-01-08 NOTE — ED INITIAL ASSESSMENT (HPI)
Pt present with cough x about a week. Pt reports diarrhea/vomiting q3hrs for the last 30hr.  Pt also c/o shortness of breath and weakness , hx anemia hgb 7.7 MD referred to ER.

## 2024-01-08 NOTE — CONSULTS
[facility]: GYNECOLOGIC ONCOLOGY CONSULTATION     MEDICAL RECORD #: [id number] DATE OF SERVICE: 1/8/2024             REASON FOR CONSULTATION:   Chief Complaint   Patient presents with    Difficulty Breathing    Nausea/Vomiting/Diarrhea    Anemia       HISTORY OF PRESENT ILLNESS:   Alysia Campos is a 53 year old female who has had a recent history of cough  nausea, vomiting, diarrhea and shortness of breath for which she presented to the ER  . She also notes prior to Sept she was having monthly heavy vaginal bleeding with abdominal cramping. She states in Sept she only had light spotting and she has not had vaginal bleeding since. She states she has occasional LLQ pain, does not report any aggravating factors. She notes abdominal distention, loss of appetite and a 10 lb unexplained weight loss. Patient had abdominal/pelvic CT scan that showed lung and liver nodules, an omental mass, and a left ovarian 8 cm mass.  She was admitted with anemia, leukocytosis, and electrolyte imbalances.    Mother was diagnosed with breast cancer in her 60s.  Patient was referred by Dr. Fuentes.    PAST MEDICAL HISTORY:  Past Medical History:   Diagnosis Date    Anemia        SURGICAL HISTORY:   History reviewed. No pertinent surgical history.    ALLERGIES:  Not on File    MEDICATIONS:    LORazepam    acetaminophen    HYDROmorphone **OR** HYDROmorphone **OR** HYDROmorphone    ondansetron    prochlorperazine    sodium chloride      GYNECOLOGIC HISTORY:  No LMP recorded (lmp unknown).  Patient has no history on file for sexual activity.    LAST PAP:   Many years ago, reports no history of abnormal paps    PATHOLOGY:  No results found for: \"FINALDX\"    TUMOR MARKERS:     Lab Results   Component Value Date     61.7 (H) 01/08/2024     Lab Results   Component Value Date    .3 (H) 01/08/2024     Lab Results   Component Value Date     150.3 (H) 01/08/2024         LAST SCREENING MAMMOGRAM:  unknown    RADIOLOGY:  Narrative    PROCEDURE:  CT ABDOMEN PELVIS IV CONTRAST, NO ORAL (ER)     COMPARISON:  [facility] , XR, XR CHEST PA + LAT CHEST (LAE=[id number]), 1/08/2024, 10:01 AM.     INDICATIONS:  Patient presents with emesis, diarrhea and shortness of breath.     TECHNIQUE:  CT scanning was performed from the dome of the diaphragm to the pubic symphysis with non-ionic intravenous contrast material. Post contrast coronal MPR imaging was performed.  Dose reduction techniques were used. Dose information is  transmitted to the ACR (American College of Radiology) NRDR (National Radiology Data Registry) which includes the Dose Index Registry.     PATIENT STATED HISTORY:(As transcribed by Technologist)  Patient is here for shortness of breath, vomit, and diarrhea.      CONTRAST USED:  100cc of Isovue 370     FINDINGS:    LIVER:  There are innumerable hypoenhancing, hypodense mass lesions throughout the liver consistent with metastatic disease.  One of the largest lesions located within the posterior segment of the right lobe measuring 4.7 x 5.3 cm.  BILIARY:  No visible dilatation or calcification.    PANCREAS:  No lesion, fluid collection, ductal dilatation, or atrophy.    SPLEEN:  No enlargement or focal lesion.    KIDNEYS:  No mass, obstruction, or calcification.    ADRENALS:  No mass or enlargement.    AORTA/VASCULAR:  No aneurysm or dissection.    RETROPERITONEUM:  No mass or adenopathy.    BOWEL/MESENTERY:  There is a lobulated omental base mass suggested within the right lower quadrant/right pelvic inlet measuring 4.3 x 8.5 cm.  The stomach, duodenum sweep and small bowel are unremarkable.  The colon is decompressed.    ABDOMINAL WALL:  No mass or hernia.    URINARY BLADDER:  No visible focal wall thickening, lesion, or calculus.    PELVIC NODES:  No adenopathy.    PELVIC ORGANS:  There is a hypodense, peripherally enhancing mass within the left adnexal region measuring approximately 7.8 x 6.8 cm, suspicious for a left ovarian neoplasm.  BONES:   No bony lesion or fracture.    LUNG BASES:  There are multiple bilateral pulmonary nodules/masses concerning for metastatic disease to the lungs.  The largest of these is seen within the posterior aspect of the right lung base measuring 4.9 x 4.4 cm.                   Impression   CONCLUSION:    1. Findings concerning for metastatic disease.  There are innumerable nodules within the visualized lungs as well as innumerable metastatic lesions throughout the liver.  Additionally, there is an omental base mass within the right lower quadrant and a  large mass which appears to be emanating from the left ovary measuring 7.8 cm.  2. There is no discrete evidence of an acute intra-abdominal or pelvic process.              OBSTETRIC HISTORY:   OB History   No obstetric history on file.   3 vaginal deliveries    SOCIAL HISTORY:  Patient lives at [place].    Ambulatory Status:  independent  Performance Status:  0 - Fully active, able to carry on all pre-disease performance without restriction.    Social History     Socioeconomic History    Marital status:      Spouse name: Not on file    Number of children: Not on file    Years of education: Not on file    Highest education level: Not on file   Occupational History    Not on file   Tobacco Use    Smoking status: Never    Smokeless tobacco: Never   Vaping Use    Vaping Use: Never used   Substance and Sexual Activity    Alcohol use: Not Currently    Drug use: Never    Sexual activity: Not on file   Other Topics Concern    Not on file   Social History Narrative    Not on file     Social Determinants of Health     Financial Resource Strain: Not on file   Food Insecurity: No Food Insecurity (1/8/2024)    Food Insecurity     Food Insecurity: Never true   Transportation Needs: No Transportation Needs (1/8/2024)    Transportation Needs     Lack of Transportation: No   Physical Activity: Not on file   Stress: Not on file   Social Connections: Not on file   Housing Stability:  Low Risk  (1/8/2024)    Housing Stability     Housing Instability: No     Housing Instability Emergency: Not on file       FAMILY HISTORY:  Family History   Problem Relation Age of Onset    Breast Cancer Mother 60 - 69       REVIEW OF SYSTEMS:  General ROS: positive for  - chills, fever, and weight loss  Ophthalmic ROS: negative  ENT ROS: positive for - headaches  Cardiovascular ROS: positive for - dyspnea on exertion  Respiratory ROS: positive for - cough and shortness of breath  Gastrointestinal ROS: positive for - abdominal pain, diarrhea, and nausea/vomiting  Genito-Urinary ROS: no dysuria, trouble voiding, or hematuria  Musculoskeletal ROS: negative  Neurological ROS: no TIA or stroke symptoms  Psychological ROS: negative  Endocrine ROS: negative  Hematological and Lymphatic ROS: negative    PHYSICAL EXAMINATION:  /81   Pulse 115   Resp (!) 32   Ht 5' 2\" (1.575 m)   Wt 165 lb (74.8 kg)   LMP  (LMP Unknown)   SpO2 96%   BMI 30.18 kg/m²   Body mass index is 30.18 kg/m².    GENERAL: alert and oriented, cooperative, in no acute distress  HEENT: extra ocular movement intact  THYROID: non-palpable  LYMPH NODES: Cervical, supraclavicular, and axillary nodes normal.  LUNGS: clear to auscultation bilaterally  HEART: regular rate and rhythm  ABDOMEN:  distended, firm, non-tender  EXTREMITIES: extremities normal, atraumatic, no cyanosis or edema  NEUROLOGIC: motor grossly intact  Pelvic exam: deferred    ASSESSMENT:  Dx:  AUB  Anemia  Peritoneal carcinomatosis  Ovarian mass, left    53 year old female with recent history of cough, nausea, vomiting, diarrhea and shortness of breath for which she presented to the ER . She also notes prior to Sept she was having monthly heavy vaginal bleeding with abdominal cramping. She states in Sept she only had light spotting and she has not had vaginal bleeding since. She notes abdominal distention, loss of appetite and a 10 lb unexplained weight loss.     Abdominal/pelvic  CT scan that showed lung and liver nodules, an omental mass, and a left ovarian 8 cm mass.    The index of suspicion for malignancy is high for ovarian cancer    Comorbidities:  mother with breast CA dx in 60s    PLAN:    Patient given options of alternative treatment/surgical intervention including chemotherapy (neoadjuvant, NAC) vs. Interval debulking surgery (IDS) given the high suspicion of cancer, pending tissue diagnosis. We dicussed that typical treatment for ovarian cancer or advanced uterine cancer includes both chemotherapy and surgical intervention. We recommend IDS after 3-4 cycles of NAC pending results of biopsy.    Pelvic US    , CEA, CA 19-9    CT chest    IR biopsy of omental mass    Referral for Hem Onc given possible advance disease for NAC consideration    The patient verbalized good understanding of this.  I will keep you informed of her progress.  Thank you for allowing me to participate in the care of this patient.    MAGNOLIA Johnson  01/08/24

## 2024-01-09 NOTE — PROGRESS NOTES
[facility]    Progress Note     Alysia Campos Patient Status:  Inpatient    7/15/1970 MRN [id number]   Location [facility] 4NW-A Attending Harshal Aponte MD   Hosp Day # 1 PCP Renetta Santos DO     Follow up for: The primary encounter diagnosis was Metastatic malignant neoplasm, unspecified site (HCC). Diagnoses of Hyponatremia, Leukocytosis, unspecified type, and Anemia, unspecified type were also pertinent to this visit.      Interval History/Subjective:     1 unit unit PRBC transfusion not given yesterday.  Hemoglobin 6.1 this morning  DEBI overnight  Afebrile, HDS    No bleeding or bruising noted since admission.  Has not had a BM since admission.  Intermittent cramping abdominal pain relieved with higher dose of Dilaudid.  No new or worsening symptoms    Vital signs:  Temp:  [98 °F (36.7 °C)-98.9 °F (37.2 °C)] 98.9 °F (37.2 °C)  Pulse:  [] 108  Resp:  [18-26] 20  BP: (104-146)/() 119/63  SpO2:  [94 %-100 %] 95 %    Physical Exam:      General: No acute distress. Comfortable, nontoxic Appears pale  HEENT: Normocephalic atraumatic. Moist mucous membranes; Sclera anicteric.  Neck: Supple, +JVD  Respiratory: Diminished at bilateral bases, otherwise CTA; ;reg resp rate & effort, no wheezes/crackles   Cardiovascular: S1, S2. Regular rate and rhythm. No murmurs appreciable   Chest and Back: No tenderness or deformity.  Abdomen:  mildly distended, +tender mobile mass in LLQ; +hepatomegaly; no ascites, no rebound/guarding  Neurologic: No focal neurological deficits. CNII-XII grossly intact.  Musculoskeletal: Moves all extremities.  Extremities: No edema or cyanosis.  Integument: No rashes or lesions.   Psychiatric: Appropriate mood and affect.       Diagnostic Data:      Labs:  Recent Labs   Lab 24  0954 24  0722   WBC 31.6* 30.8*   HGB 7.0* 6.1*   MCV 66.4* 67.6*   .0 371.0   BAND 1 2   INR  --  1.07       Recent Labs   Lab 24  0954 24  1250 24  1507  01/08/24 1948 01/09/24  0722   *   < > 98 98 101*   BUN 12   < > 9 9 12   CREATSERUM 0.60   < > 0.62 0.66 0.69   CA 9.1   < > 8.8 8.7 8.6   ALB 3.6  --   --   --   --    *   < > 126* 129* 129*   K 3.5   < > 3.5 3.4* 3.3*   CL 92*   < > 95* 95* 98   CO2 23.0   < > 20.0* 21.0 21.0   ALKPHO 294*  --   --   --   --    AST 75*  --   --   --   --    ALT 36  --   --   --   --    BILT 0.8  --   --   --   --    TP 7.7  --   --   --   --     < > = values in this interval not displayed.       Recent Labs   Lab 01/09/24  0722   PTP 13.9   INR 1.07       No results for input(s): \"TROP\", \"CK\" in the last 168 hours.         Imaging: Imaging data reviewed in Epic.    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   -- CT Abd w/ multiple pulmonary, hepatic metastases, omental mass, 7.8cm left ovarian mass  -- CT Chest performed 1/8 w/ innumerable pulmonary nodules  w/ large confluent masses at bilateral bases  -- Gyn Onc consulted, plan discussed  -Oncology consult, recommendations reviewed, pending results of biopsy  -US pelvis pending  -- IR consulted, recommending ultrasound-guided biopsy of liver, pending  -Continue Norco, Dilaudid as needed for pain     Hyponatremia-improving  -- likely hypovolemic in the setting of poor PO intake, nausea/vomiting  -- Na 126 on admission, s/p 1L NS bolus  -- Correcting appropriately today, continue normal saline at 150 cc/h  -Every 8 hour BMP     Acute on Chronic Anemia    -- recent baseline Hgb 7.7-8.0, 7.0 on admission  -- no e/o active bleeding or hemodynamic instability  -- 1 unit PRBC transfusion ordered 1/8, pending  -- Iron studies consistent with deficiency, Venofer ordered  -CTM, transfuse to maintain goal hemoglobin >7.0    DVT Mechanical Prophylaxis:   SCDs,    DVT Pharmacologic Prophylaxis   Medication   None              Code Status: FULL     Dispo: inpatient; EVA 1-2 days pending postprocedure course, clinical improvement, pain control     Plan of care discussed with patient and/or family at bedside.    LINDSAY Aponte MD  [facility]   [phone]      This note was created using voice recognition technology. It may include inadvertent transcriptional errors. Any such errors should be contextually interpreted and should not be taken to alter the content or the meaning.     Note to Patient: The 21st Century Cures Act makes medical notes like these available to patients in the interest of transparency. However, be advised this is a medical document. It is intended as peer to peer communication. It is written in medical language and may contain abbreviations or verbiage that are unfamiliar. It may appear blunt or direct. Medical documents are intended to carry relevant information, facts as evident, and the clinical opinion of the practitioner and not intended to be the primary source of your information.  Please refer directly to myself or clinical staff for information regarding plan of care.

## 2024-01-09 NOTE — IMAGING NOTE
Floor RN - Elida notified to keep patient NPO for procedure.  Hold all blood thinners, saline lock IV, patient consentable, tentative procedure time 1100.  PT INR ordered in place of PTT (ordered accidentally by day shift RN yesterday).  Also noted to offgoing NOC shift RN to report off to day shift RN that we still need clarification which area MD's prefer we biopsy.  Still currently order for both liver mass and omental mass biopsy.  RN verbalized understanding.      0746 - BECKY Marrero called back states MD clarified that they want both liver lesion and omental mass biopsied.  RN to change US omental mass bx to CT.        0752 - Hospitalist cancelled CT liver lesion biopsy.  Day RN not yet aware of cancellation and per NOC shift RN handoff still thinking both sites need to be biopsied.  Day shift RN called to clarify if we still need to biopsy both sites or just omental mass as ordered now. Patient to get a unit of blood today but blood hasn't arrived.  Will review with radiologist soon about biopsying one site versus both.

## 2024-01-09 NOTE — CONSULTS
[facility]  Report of Consultation    Alysia Campos Patient Status:  Inpatient    7/15/1970 MRN [id number]   Location [facility] 4NW-A Attending Harshal Aponte MD   Hosp Day # 1 PCP Renetta Santos DO     Reason for Consultation:  Likely new GYN malignancy    History of Present Illness:  Alysia Campos is a a(n) 53 year old female that presented to [facility] ED one day ago with cough, fatigue, CAREY for one week pta, and n/v/d x 2 days.    Upon evaluation she was found to be afebrile and hemodynamically stable.  Labs showed WBC 31, hemoglobin 7.    Hx of menorrhagia  x one year with anemia    CT scan a/p showed innumerable metastatic lesions in liver and bilateral lung bases.  Omental based mass in RLQ and L ovarian mass measuring around 8 cm    Note history of occl LLQ pain, abdominal distention, 10 lbs unintentional weight loss.    GynOnc was consulted and IR guided biopsy is planned.  CT chest shows innumerable pulmonary masses and large confluent masses at lung bases bilaterally.  Mediastinal and hilar adenopathy noted.    --61  CA 19-9--150.3          History:  Past Medical History:   Diagnosis Date    Anemia     Migraines      History reviewed. No pertinent surgical history.  Family History   Problem Relation Age of Onset    Breast Cancer Mother 60 - 69      reports that she has never smoked. She has never used smokeless tobacco. She reports that she does not currently use alcohol. She reports that she does not use drugs.    Allergies:  Allergies   Allergen Reactions    Mold TINITUS       Medications:    Current Facility-Administered Medications:     acetaminophen (Tylenol Extra Strength) tab 500 mg, 500 mg, Oral, Q4H PRN    HYDROmorphone (Dilaudid) 1 MG/ML injection 0.2 mg, 0.2 mg, Intravenous, Q2H PRN **OR** HYDROmorphone (Dilaudid) 1 MG/ML injection 0.4 mg, 0.4 mg, Intravenous, Q2H PRN **OR** HYDROmorphone (Dilaudid) 1 MG/ML injection 0.8 mg, 0.8 mg, Intravenous, Q2H PRN    ondansetron  (Zofran) 4 MG/2ML injection 4 mg, 4 mg, Intravenous, Q6H PRN    prochlorperazine (Compazine) 10 MG/2ML injection 5 mg, 5 mg, Intravenous, Q8H PRN    sodium chloride 0.9% infusion, , Intravenous, Continuous    furosemide (Lasix) 10 mg/mL injection 20 mg, 20 mg, Intravenous, Once    butalbital-acetaminophen-caffeine (Fioricet) -40 MG per tab 1 tablet, 1 tablet, Oral, Q4H PRN    Review of Systems:  A 10-point review of systems was done with pertinent positives and negatives per the HPI.    Physical Exam:   Blood pressure 123/62, pulse 89, temperature 98 °F (36.7 °C), temperature source Oral, resp. rate 20, height 5' 2\" (1.575 m), weight 165 lb (74.8 kg), SpO2 99%.   General: Patient is alert and oriented x 3, not in acute distress.   HEENT: EOMs intact. PERRL. Oropharynx is clear.    Neck: No JVD. No palpable lymphadenopathy. Neck is supple.   Chest: Clear to auscultation.   Heart: Regular rate and rhythm.    Abdomen: Soft, non tender with good bowel sounds.   Extremities: Pedal pulses are present. No edema.   Neurological: Grossly intact.    Lymphatics: There is no palpable lymphadenopathy throughout in the cervical,            supraclavicular, axillary, or inguinal regions.     Laboratory Data:    Lab Results   Component Value Date    WBC 31.6 01/08/2024    HGB 7.0 01/08/2024    HCT 23.1 01/08/2024    .0 01/08/2024    CREATSERUM 0.66 01/08/2024    BUN 9 01/08/2024     01/08/2024    K 3.4 01/08/2024    CL 95 01/08/2024    CO2 21.0 01/08/2024    GLU 98 01/08/2024    CA 8.7 01/08/2024    ALB 3.6 01/08/2024    ALKPHO 294 01/08/2024    BILT 0.8 01/08/2024    TP 7.7 01/08/2024    AST 75 01/08/2024    ALT 36 01/08/2024       Imaging:  CT CHEST (CPT=71250)    Result Date: 1/8/2024  CONCLUSION:  Innumerable pulmonary masses are noted with large confluent masses at lung bases bilaterally.  There is mediastinal and hilar lymphadenopathy.  Findings are consistent with a diffusely metastatic disease.  LOCATION:      Dictated by (CST): Melvin Adan MD on 1/08/2024 at 7:53 PM     Finalized by (CST): Melvin Adan MD on 1/08/2024 at 7:58 PM       CT ABDOMEN PELVIS IV CONTRAST, NO ORAL (ER)    Result Date: 1/8/2024  CONCLUSION:  1. Findings concerning for metastatic disease.  There are innumerable nodules within the visualized lungs as well as innumerable metastatic lesions throughout the liver.  Additionally, there is an omental base mass within the right lower quadrant and a large mass which appears to be emanating from the left ovary measuring 7.8 cm. 2. There is no discrete evidence of an acute intra-abdominal or pelvic process.   LOCATION:  [id number]   Dictated by (CST): Bre Silva DO on 1/08/2024 at 12:17 PM     Finalized by (CST): Bre Silva DO on 1/08/2024 at 12:25 PM       XR CHEST PA + LAT CHEST (CPT=71046)    Result Date: 1/8/2024  CONCLUSION:  1. Suggestion of multiple bilateral pulmonary nodules.  Recommend CT of the chest for further evaluation.   LOCATION:  [facility]   Dictated by (CST): Deyanira Whipple MD on 1/08/2024 at 10:27 AM     Finalized by (CST): Deyanira Whipple MD on 1/08/2024 at 10:29 AM         Impression and Plan:    Patient Active Problem List   Diagnosis    Hypokalemia    Metastatic malignant neoplasm, unspecified site (HCC)    Hyponatremia    Leukocytosis, unspecified type    Anemia, unspecified type       Impression  Metastatic malignancy based on imaging, very likely ovarian based on data set  Omental mass biopsy pending later today.  Anemia, likely multifactorial with components of ESSIE, underlying disease  Agree that pending tissue diagnosis that NAC will be required    Plan  Await tissue diagnosis.  We noted results may be not be available until early next week.  2.    IV iron ordered with Venofer to begin post biopsy.    Thank you for allowing me to participate in the care of your patient.    Damir Mckeon MD  1/9/2024  6:39 AM

## 2024-01-09 NOTE — PROGRESS NOTES
[facility]: GYNECOLOGIC ONCOLOGY FOLLOW UP    MEDICAL RECORD #: [id number] DATE OF SERVICE: 1/9/2024             REASON FOR VISIT:   Chief Complaint   Patient presents with    Difficulty Breathing    Nausea/Vomiting/Diarrhea    Anemia       HISTORY OF PRESENT ILLNESS:   Alysia Campos is a 53 year old female who has had a recent history of cough  nausea, vomiting, diarrhea and shortness of breath for which she presented to the ER  . She also notes prior to Sept she was having monthly heavy vaginal bleeding with abdominal cramping. She states in Sept she only had light spotting and she has not had vaginal bleeding since. She states she has occasional LLQ pain, does not report any aggravating factors. She notes abdominal distention, loss of appetite and a 10 lb unexplained weight loss. Patient had abdominal/pelvic CT scan that showed lung and liver nodules, an omental mass, and a left ovarian 8 cm mass.  She was admitted with anemia, leukocytosis, and electrolyte imbalances.    Endorsing LLQ pain today controlled with ibuprofen, states IV pain meds are not helping.    Mother was diagnosed with breast cancer in her 60s.  Patient was referred by Dr. Fuentes.    PAST MEDICAL HISTORY:  Past Medical History:   Diagnosis Date    Anemia     Migraines        SURGICAL HISTORY:   History reviewed. No pertinent surgical history.    ALLERGIES:  Allergies   Allergen Reactions    Mold TINITUS       MEDICATIONS:    iron sucrose    HYDROmorphone **OR** HYDROmorphone **OR** HYDROmorphone    acetaminophen    ondansetron    prochlorperazine    sodium chloride    furosemide    butalbital-acetaminophen-caffeine      GYNECOLOGIC HISTORY:  No LMP recorded (lmp unknown).  Patient has no history on file for sexual activity.    LAST PAP:   Many years ago, reports no history of abnormal paps    PATHOLOGY:  No results found for: \"FINALDX\"    TUMOR MARKERS:     Lab Results   Component Value Date     61.7 (H) 01/08/2024     Lab Results    Component Value Date    .3 (H) 01/08/2024     Lab Results   Component Value Date     150.3 (H) 01/08/2024         LAST SCREENING MAMMOGRAM:  unknown    RADIOLOGY:  Narrative   PROCEDURE:  CT ABDOMEN PELVIS IV CONTRAST, NO ORAL (ER)     COMPARISON:  [facility] , XR, XR CHEST PA + LAT CHEST ([id number]), 1/08/2024, 10:01 AM.     INDICATIONS:  Patient presents with emesis, diarrhea and shortness of breath.     TECHNIQUE:  CT scanning was performed from the dome of the diaphragm to the pubic symphysis with non-ionic intravenous contrast material. Post contrast coronal MPR imaging was performed.  Dose reduction techniques were used. Dose information is  transmitted to the ACR (American College of Radiology) NRDR (National Radiology Data Registry) which includes the Dose Index Registry.     PATIENT STATED HISTORY:(As transcribed by Technologist)  Patient is here for shortness of breath, vomit, and diarrhea.      CONTRAST USED:  100cc of Isovue 370     FINDINGS:    LIVER:  There are innumerable hypoenhancing, hypodense mass lesions throughout the liver consistent with metastatic disease.  One of the largest lesions located within the posterior segment of the right lobe measuring 4.7 x 5.3 cm.  BILIARY:  No visible dilatation or calcification.    PANCREAS:  No lesion, fluid collection, ductal dilatation, or atrophy.    SPLEEN:  No enlargement or focal lesion.    KIDNEYS:  No mass, obstruction, or calcification.    ADRENALS:  No mass or enlargement.    AORTA/VASCULAR:  No aneurysm or dissection.    RETROPERITONEUM:  No mass or adenopathy.    BOWEL/MESENTERY:  There is a lobulated omental base mass suggested within the right lower quadrant/right pelvic inlet measuring 4.3 x 8.5 cm.  The stomach, duodenum sweep and small bowel are unremarkable.  The colon is decompressed.    ABDOMINAL WALL:  No mass or hernia.    URINARY BLADDER:  No visible focal wall thickening, lesion, or calculus.    PELVIC NODES:  No adenopathy.     PELVIC ORGANS:  There is a hypodense, peripherally enhancing mass within the left adnexal region measuring approximately 7.8 x 6.8 cm, suspicious for a left ovarian neoplasm.  BONES:  No bony lesion or fracture.    LUNG BASES:  There are multiple bilateral pulmonary nodules/masses concerning for metastatic disease to the lungs.  The largest of these is seen within the posterior aspect of the right lung base measuring 4.9 x 4.4 cm.                   Impression   CONCLUSION:    1. Findings concerning for metastatic disease.  There are innumerable nodules within the visualized lungs as well as innumerable metastatic lesions throughout the liver.  Additionally, there is an omental base mass within the right lower quadrant and a  large mass which appears to be emanating from the left ovary measuring 7.8 cm.  2. There is no discrete evidence of an acute intra-abdominal or pelvic process.              OBSTETRIC HISTORY:   OB History    Para Term  AB Living   3 0 0 0 0 0   SAB IAB Ectopic Multiple Live Births   0 0 0 0 0   3 vaginal deliveries    SOCIAL HISTORY:  Patient lives at [place].    Ambulatory Status:  independent  Performance Status:  0 - Fully active, able to carry on all pre-disease performance without restriction.    Social History     Socioeconomic History    Marital status:      Spouse name: Not on file    Number of children: Not on file    Years of education: Not on file    Highest education level: Not on file   Occupational History    Not on file   Tobacco Use    Smoking status: Never    Smokeless tobacco: Never   Vaping Use    Vaping Use: Never used   Substance and Sexual Activity    Alcohol use: Not Currently    Drug use: Never    Sexual activity: Not on file   Other Topics Concern    Not on file   Social History Narrative    Not on file     Social Determinants of Health     Financial Resource Strain: Not on file   Food Insecurity: No Food Insecurity (2024)    Food  Insecurity     Food Insecurity: Never true   Transportation Needs: No Transportation Needs (1/8/2024)    Transportation Needs     Lack of Transportation: No   Physical Activity: Not on file   Stress: Not on file   Social Connections: Not on file   Housing Stability: Low Risk  (1/8/2024)    Housing Stability     Housing Instability: No     Housing Instability Emergency: Not on file       FAMILY HISTORY:  Family History   Problem Relation Age of Onset    Breast Cancer Mother 60 - 69       REVIEW OF SYSTEMS:  General ROS: positive for  - chills, fever, and weight loss  Ophthalmic ROS: negative  ENT ROS: positive for - headaches  Cardiovascular ROS: positive for - dyspnea on exertion  Respiratory ROS: positive for - cough and shortness of breath  Gastrointestinal ROS: positive for - abdominal pain, diarrhea, and nausea/vomiting  Genito-Urinary ROS: no dysuria, trouble voiding, or hematuria  Musculoskeletal ROS: negative  Neurological ROS: no TIA or stroke symptoms  Psychological ROS: negative  Endocrine ROS: negative  Hematological and Lymphatic ROS: negative    PHYSICAL EXAMINATION:  /55   Pulse 87   Temp 98.6 °F (37 °C) (Oral)   Resp 18   Ht 5' 2\" (1.575 m)   Wt 165 lb (74.8 kg)   LMP  (LMP Unknown)   SpO2 99%   BMI 30.18 kg/m²   Body mass index is 30.18 kg/m².    GENERAL: alert and oriented, cooperative, in no acute distress  HEENT: extra ocular movement intact  THYROID: non-palpable  LYMPH NODES: Cervical, supraclavicular, and axillary nodes normal.  LUNGS: clear to auscultation bilaterally  HEART: regular rate and rhythm  ABDOMEN:  distended, firm, non-tender  EXTREMITIES: extremities normal, atraumatic, no cyanosis or edema  NEUROLOGIC: motor grossly intact  VULVA: no masses or lesions   URETHRA: no masses  VAGINA: no palpable masses  CERVIX: non-palpable  UTERUS: tender, mobile  LEFT ADNEXA: fullness, soft, mobile, tender  RIGHT ADNEXA:  non-palpable  RECTOVAGINAL: deferred  BACK: symmetric, no  curvature. ROM normal.     ASSESSMENT:  Dx:  AUB  Anemia  Peritoneal carcinomatosis  Ovarian mass, left    53 year old female with recent history of cough, nausea, vomiting, diarrhea and shortness of breath for which she presented to the ER . She also notes prior to Sept she was having monthly heavy vaginal bleeding with abdominal cramping. She states in Sept she only had light spotting and she has not had vaginal bleeding since. She notes abdominal distention, loss of appetite and a 10 lb unexplained weight loss.     Abdominal/pelvic CT scan that showed lung and liver nodules, an omental mass, and a left ovarian 8 cm mass.    CT chest shows innumerable pulmonary masses and large confluent masses at lung bases bilaterally.  Mediastinal and hilar adenopathy noted.     : 61  CA 19-9: 150.3  CEA: 203    The index of suspicion for malignancy is high     Comorbidities:  mother with breast CA dx in 60s    PLAN:    Patient given options of alternative treatment/surgical intervention including chemotherapy (neoadjuvant, NAC) vs. Interval debulking surgery (IDS) given the high suspicion of cancer, pending tissue diagnosis. We dicussed that typical treatment for advanced cancer includes chemotherapy and/or surgical intervention. We would recommend IDS after 3-4 cycles of NAC pending results of biopsy if a gynecologic cancer is found.    Pelvic US to evaluate source of pelvic mass    IR biopsy of omentum vs liver, per IR liver biopsy would be best    Pain control    Seen by Hem Onc given possible advance disease for chemotherapy consideration    Referral placed for GI consult for colonoscopy and EGD while pt in house given elevated CEA    The patient verbalized good understanding of this.  I will keep you informed of her progress.  Thank you for allowing me to participate in the care of this patient.    MAGNOLIA Johnson  01/09/24

## 2024-01-09 NOTE — PROGRESS NOTES
01/09/24 1016   Clinical Encounter Type   Visited With Patient;Health care provider   Routine Visit Introduction   Taxonomy   Intended Effects Demonstrate caring and concern   Methods Offer support   Interventions Silent prayer     Patient was resting.   left a card from the spiritual care department.    Resident   Judith Estes

## 2024-01-09 NOTE — PROCEDURES
[facility]  Procedure Note    Alysia Campos Patient Status:  Inpatient    7/15/1970 MRN [id number]   Location [facility] 4NW-A Attending Harshal Aponte MD   Hosp Day # 1 PCP Renetta Santos DO     Procedure: US guided core biopsy liver    Pre-Procedure Diagnosis:  Liver mass    Post-Procedure Diagnosis: Same    Anesthesia:  Local and Sedation    Findings:  2 x 18g core of R hepatic lesion    Specimens: As above    Blood Loss:  Minimal    Tourniquet Time: None  Complications:  None  Drains:  None    Secondary Diagnosis:  None    Ranulfo Doshi MD  2024

## 2024-01-09 NOTE — IMAGING NOTE
1210-Pt arrived on 6L o2 via n/c. She c/o chronic neck pain which she states she was in PT for. She also c/o diffuse lower pelvic pain. I informed her once the Radiologist spoke with her and consent was obtained I could give her pain meds and sedation. Pt verbalized understanding.   1242 Report called to BECKY-donya Boles on r/a, dressing CDI, presently denies pain and tolerated procedure well. Pt transported to  407 with Radiology RN at bedside.

## 2024-01-09 NOTE — PROGRESS NOTES
Pt is alert and oriented and is npo at midnight for bx. Pt has ivf and has complained of pain and received dilaudid and zofran with good relief. Call light in reach and safety measures in place.

## 2024-01-09 NOTE — H&P
[facility]   History & Physical    Alysia Campos Patient Status:  Inpatient    7/15/1970 MRN [id number]   Location [facility] 4NW-A Attending Harshal Aponte MD   Hosp Day # 1 PCP Renetta Santos DO     Admitting Diagnosis:   Mets to liver    History of Present Illness:   52 yo F liver mets    History   Past Medical History:  Past Medical History:   Diagnosis Date    Anemia     Migraines        Past Surgical History:  History reviewed. No pertinent surgical history.    Social History:  Social History     Tobacco Use    Smoking status: Never    Smokeless tobacco: Never   Substance Use Topics    Alcohol use: Not Currently        Family History:  Family History   Problem Relation Age of Onset    Breast Cancer Mother 60 - 69       Allergies/Medications:   Allergies:  Allergies   Allergen Reactions    Mold TINITUS       Medications:  No current outpatient medications on file.    Physical Exam & Review of Systems:   Physical Exam:    /77   Pulse 103   Temp 98.9 °F (37.2 °C) (Oral)   Resp 26   Ht 62\"   Wt 165 lb   LMP  (LMP Unknown)   SpO2 97%   BMI 30.18 kg/m²     General: NAD  Neck: No JVD  Lungs: CTA bilat  Heart: RRR, S1, S2  Abdomen: Soft, NT/ND, BS+x4  Extremities: Warm, dry, no LE edema bilat  Pulses: 2+ bilat DP    Results:   Labs:  Recent Labs   Lab 24  0954 24  0722   RBC 3.48* 3.06*   HGB 7.0* 6.1*   HCT 23.1* 20.7*   MCV 66.4* 67.6*   MCH 20.1* 19.9*   MCHC 30.3* 29.5*   RDW 17.2 17.4   NEPRELIM 25.08* 24.36*   WBC 31.6* 30.8*   .0 371.0     Recent Labs   Lab 24  0722   PTP 13.9   INR 1.07   PTT 28.4     Recent Labs   Lab 24  1507 24  1948 24  0722   GLU 98 98 101*   BUN 9 9 12   CREATSERUM 0.62 0.66 0.69   CA 8.8 8.7 8.6   * 129* 129*   K 3.5 3.4* 3.3*   CL 95* 95* 98   CO2 20.0* 21.0 21.0       Assessment/Plan:   Impression: 52 yo F liver mets    I have discussed with the patient and/or legal representative the potential benefits,  risks, and side effects of this procedure, the likelihood of the patient achieving goals; and the potential problems that might occur during recuperation.  I discussed reasonable alternatives to the procedure, including risks, benefits and side effects related to the alternatives, and risks related to not receiving this procedure.      Recommendations: US guided liver core biopsy    Ranulfo Dsohi MD  1/9/2024  12:34 PM

## 2024-01-10 NOTE — PLAN OF CARE
Patient is alert and oriented, still on 4L NC. Frequent cough, Robitussin ordered. No fever overnight, vitals stable. Patient still complaining of abdominal pain, PRN Dilaudid given for pain. No diarrhea overnight, 1 BM formed. Patient weak but ambulatory to the bathroom. Safety precautions in place, call light within reach, plan of care ongoing.     Problem: GASTROINTESTINAL - ADULT  Goal: Minimal or absence of nausea and vomiting  Description: INTERVENTIONS:  - Maintain adequate hydration with IV or PO as ordered and tolerated  - Nasogastric tube to low intermittent suction as ordered  - Evaluate effectiveness of ordered antiemetic medications  - Provide nonpharmacologic comfort measures as appropriate  - Advance diet as tolerated, if ordered  - Obtain nutritional consult as needed  - Evaluate fluid balance  Outcome: Progressing  Goal: Maintains or returns to baseline bowel function  Description: INTERVENTIONS:  - Assess bowel function  - Maintain adequate hydration with IV or PO as ordered and tolerated  - Evaluate effectiveness of GI medications  - Encourage mobilization and activity  - Obtain nutritional consult as needed  - Establish a toileting routine/schedule  - Consider collaborating with pharmacy to review patient's medication profile  Outcome: Progressing     Problem: PAIN - ADULT  Goal: Verbalizes/displays adequate comfort level or patient's stated pain goal  Description: INTERVENTIONS:  - Encourage pt to monitor pain and request assistance  - Assess pain using appropriate pain scale  - Administer analgesics based on type and severity of pain and evaluate response  - Implement non-pharmacological measures as appropriate and evaluate response  - Consider cultural and social influences on pain and pain management  - Manage/alleviate anxiety  - Utilize distraction and/or relaxation techniques  - Monitor for opioid side effects  - Notify MD/LIP if interventions unsuccessful or patient reports new pain  -  Anticipate increased pain with activity and pre-medicate as appropriate  Outcome: Not Progressing

## 2024-01-10 NOTE — PROGRESS NOTES
[facility]    Progress Note     Alysia Campos Patient Status:  Inpatient    7/15/1970 MRN [id number]   Location [facility] 4NW-A Attending Harshal Aponte MD   Hosp Day # 2 PCP Renetta Santos DO     Follow up for: The primary encounter diagnosis was Metastatic malignant neoplasm, unspecified site (HCC). Diagnoses of Hyponatremia, Leukocytosis, unspecified type, and Anemia, unspecified type were also pertinent to this visit.      Interval History/Subjective:     Biopsy well tolerated, path pending  Hgb 6.4 this am, repeat transfusion pending     DEBI overnight  Afebrile, BP stable but now tachycardic, O2 requirements 4-6LPM    Having worsening shortness of breath secondary to cough, with persistent abdominal pain.    Vital signs:  Temp:  [98.5 °F (36.9 °C)-99.2 °F (37.3 °C)] 99.2 °F (37.3 °C)  Pulse:  [] 106  Resp:  [18-26] 26  BP: (103-146)/() 146/62  SpO2:  [92 %-99 %] 97 %    Physical Exam:      General: No acute distress. Comfortable, nontoxic Appears pale  HEENT: Normocephalic atraumatic. Moist mucous membranes; Sclera anicteric.  Neck: Supple, +JVD  Respiratory: Diminished at bilateral bases, otherwise CTA; ;reg resp rate & effort, no wheezes/crackles   Cardiovascular: S1, S2. Tachycardic. No murmurs appreciable   Chest and Back: No tenderness or deformity.  Abdomen:  mildly distended, +tender mobile mass in LLQ; +hepatomegaly; no ascites, no rebound/guarding  Neurologic: No focal neurological deficits. CNII-XII grossly intact.  Musculoskeletal: Moves all extremities.  Extremities: No edema or cyanosis.  Integument: No rashes or lesions.   Psychiatric: Appropriate mood and affect.       Diagnostic Data:      Labs:  Recent Labs   Lab 24  0954 24  0722 24  1726 01/10/24  0712   WBC 31.6* 30.8*  --  35.3*   HGB 7.0* 6.1* 7.1* 6.6*   MCV 66.4* 67.6*  --  71.3*   .0 371.0  --  343.0   BAND 1 2  --  1   INR  --  1.07  --   --        Recent Labs   Lab 24  0964  01/08/24  1250 01/09/24  1726 01/10/24  0019 01/10/24  0712   *   < > 122* 111* 117*   BUN 12   < > 14 16 15   CREATSERUM 0.60   < > 0.81 0.75 0.64   CA 9.1   < > 9.0 8.7 8.8   ALB 3.6  --   --   --   --    *   < > 129* 129* 127*   K 3.5   < > 4.0 3.9 4.0  4.0   CL 92*   < > 98 99 98   CO2 23.0   < > 21.0 22.0 20.0*   ALKPHO 294*  --   --   --   --    AST 75*  --   --   --   --    ALT 36  --   --   --   --    BILT 0.8  --   --   --   --    TP 7.7  --   --   --   --     < > = values in this interval not displayed.       Recent Labs   Lab 01/09/24  0722   PTP 13.9   INR 1.07       No results for input(s): \"TROP\", \"CK\" in the last 168 hours.         Imaging: Imaging data reviewed in Epic.    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   Hyponatremia  Acute on Chronic Anemia      Plan    Hypoxia, tachycardia  - BNP elevated may be reflective of volume overload given her persistent IV fluids and recent transfusions  - Will give 20 mg IV Lasix following transfusion today (below)  - Check CT PE given increased risk for VTE and patient currently not on chemical DVT prophylaxis given severe anemia    Ovarian Mass  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   -- CT Abd w/ multiple pulmonary, hepatic metastases, omental mass, 7.8cm left ovarian mass  -- CT Chest performed 1/8 w/ innumerable pulmonary nodules  w/ large confluent masses at bilateral bases  -- Gyn Onc consulted, plan discussed  -Oncology consult, recommendations reviewed, pending results of biopsy  - US pelvis  unremarkable   -Status post US guided biopsy of liver 1/9/2024, pathology pending  -Continue Norco, Dilaudid as needed for pain  -1/10: Based on patient's tenuous clinical status, may require first round of chemotherapy as inpatient per oncology, pending initial pathology results    Hyponatremia-improving  -- likely hypovolemic in the setting of poor PO intake, nausea/vomiting  -- Na 126 on admission, s/p 1L NS bolus  -- Correcting appropriately today, continue normal saline at 150 cc/h  -Every 8 hour BMP     Acute on Chronic Anemia   -- recent baseline Hgb 7.7-8.0, 7.0 on admission  -- no e/o active bleeding or hemodynamic instability  -- 1 unit PRBC given 1/9 with minimal response, additional unit ordered today  -- Iron studies consistent with deficiency, Venofer ordered  - CTM, transfuse to maintain goal hemoglobin >7.0  - GI consulted due to persistent anemia, elevated CEA levels, no acute interventions for now    DVT Mechanical Prophylaxis:   SCDs,    DVT Pharmacologic Prophylaxis   Medication   None              Code Status: FULL     Dispo: inpatient; EVA >2 midnights  Plan of care discussed with patient and/or family at bedside.    N Maikel Aponte MD  [facility]   [phone]      This note was created using voice recognition technology. It may include inadvertent transcriptional errors. Any such errors should be contextually interpreted and should not be taken to alter the content or the meaning.     Note to Patient: The 21st Century Cures Act makes medical notes like these available to patients in the interest of transparency. However, be advised this is a medical document. It is intended as peer to peer communication. It is written in medical language and may contain abbreviations or verbiage that are unfamiliar. It may appear blunt or direct. Medical documents are intended to carry relevant information, facts as evident, and the clinical opinion of the practitioner and not intended to be the  primary source of your information.  Please refer directly to myself or clinical staff for information regarding plan of care.

## 2024-01-10 NOTE — PROGRESS NOTES
[facility]: GYNECOLOGIC ONCOLOGY FOLLOW UP    MEDICAL RECORD #: [id number] DATE OF SERVICE: 1/10/2024             REASON FOR VISIT:   Chief Complaint   Patient presents with    Difficulty Breathing    Nausea/Vomiting/Diarrhea    Anemia       HISTORY OF PRESENT ILLNESS:   Alysia Campos is a 53 year old female who has had a recent history of cough  nausea, vomiting, diarrhea and shortness of breath for which she presented to the ER  . She also notes prior to Sept she was having monthly heavy vaginal bleeding with abdominal cramping. She states in Sept she only had light spotting and she has not had vaginal bleeding since. She states she has occasional LLQ pain, does not report any aggravating factors. She notes abdominal distention, loss of appetite and a 10 lb unexplained weight loss. Patient had abdominal/pelvic CT scan that showed lung and liver nodules, an omental mass, and a left ovarian 8 cm mass.  She was admitted with anemia, leukocytosis, and electrolyte imbalances.    Endorsing diffuse abdominal pain worse in the  LLQ.    Mother was diagnosed with breast cancer in her 60s.  Patient was referred by Dr. Fuentes.    PAST MEDICAL HISTORY:  Past Medical History:   Diagnosis Date    Anemia     Migraines        SURGICAL HISTORY:   History reviewed. No pertinent surgical history.    ALLERGIES:  Allergies   Allergen Reactions    Mold TINITUS       MEDICATIONS:    guaiFENesin ER    benzonatate    LORazepam    bisacodyl    [START ON 1/11/2024] polyethylene glycol-electrolyte    polyethylene glycol-electrolyte    [START ON 1/11/2024] multivitamin    iron sucrose    HYDROmorphone **OR** HYDROmorphone **OR** HYDROmorphone    HYDROcodone-acetaminophen **OR** HYDROcodone-acetaminophen    dextromethorphan-guaiFENesin    acetaminophen    ondansetron    prochlorperazine    sodium chloride    butalbital-acetaminophen-caffeine      GYNECOLOGIC HISTORY:  No LMP recorded (lmp unknown).  Patient has no history on file for  sexual activity.    LAST PAP:   Many years ago, reports no history of abnormal paps    PATHOLOGY:  No results found for: \"FINALDX\"    TUMOR MARKERS:     Lab Results   Component Value Date     61.7 (H) 01/08/2024     Lab Results   Component Value Date    .3 (H) 01/08/2024     Lab Results   Component Value Date     150.3 (H) 01/08/2024         LAST SCREENING MAMMOGRAM:  unknown    RADIOLOGY:  Narrative   PROCEDURE:  CT ABDOMEN PELVIS IV CONTRAST, NO ORAL (ER)     COMPARISON:  [facility] , XR, XR CHEST PA + LAT CHEST (OGX=[id number]), 1/08/2024, 10:01 AM.     INDICATIONS:  Patient presents with emesis, diarrhea and shortness of breath.     TECHNIQUE:  CT scanning was performed from the dome of the diaphragm to the pubic symphysis with non-ionic intravenous contrast material. Post contrast coronal MPR imaging was performed.  Dose reduction techniques were used. Dose information is  transmitted to the ACR (American College of Radiology) NRDR (National Radiology Data Registry) which includes the Dose Index Registry.     PATIENT STATED HISTORY:(As transcribed by Technologist)  Patient is here for shortness of breath, vomit, and diarrhea.      CONTRAST USED:  100cc of Isovue 370     FINDINGS:    LIVER:  There are innumerable hypoenhancing, hypodense mass lesions throughout the liver consistent with metastatic disease.  One of the largest lesions located within the posterior segment of the right lobe measuring 4.7 x 5.3 cm.  BILIARY:  No visible dilatation or calcification.    PANCREAS:  No lesion, fluid collection, ductal dilatation, or atrophy.    SPLEEN:  No enlargement or focal lesion.    KIDNEYS:  No mass, obstruction, or calcification.    ADRENALS:  No mass or enlargement.    AORTA/VASCULAR:  No aneurysm or dissection.    RETROPERITONEUM:  No mass or adenopathy.    BOWEL/MESENTERY:  There is a lobulated omental base mass suggested within the right lower quadrant/right pelvic inlet measuring 4.3 x 8.5 cm.  The  stomach, duodenum sweep and small bowel are unremarkable.  The colon is decompressed.    ABDOMINAL WALL:  No mass or hernia.    URINARY BLADDER:  No visible focal wall thickening, lesion, or calculus.    PELVIC NODES:  No adenopathy.    PELVIC ORGANS:  There is a hypodense, peripherally enhancing mass within the left adnexal region measuring approximately 7.8 x 6.8 cm, suspicious for a left ovarian neoplasm.  BONES:  No bony lesion or fracture.    LUNG BASES:  There are multiple bilateral pulmonary nodules/masses concerning for metastatic disease to the lungs.  The largest of these is seen within the posterior aspect of the right lung base measuring 4.9 x 4.4 cm.                   Impression   CONCLUSION:    1. Findings concerning for metastatic disease.  There are innumerable nodules within the visualized lungs as well as innumerable metastatic lesions throughout the liver.  Additionally, there is an omental base mass within the right lower quadrant and a  large mass which appears to be emanating from the left ovary measuring 7.8 cm.  2. There is no discrete evidence of an acute intra-abdominal or pelvic process.        PROCEDURE:  CT CHEST (CPT=71250)     COMPARISON:  None.     INDICATIONS:  shortness of breath and vomiting/diarrhea for last 2 days, anemia hgb 7.7, MD referred to ER, pale with 97% on RA and  at TL     TECHNIQUE:  Unenhanced multislice CT scanning is performed through the chest.  Dose reduction techniques were used. Dose information is transmitted to the ACR (American College of Radiology) NRDR (National Radiology Data Registry) which includes the Dose   Index Registry.     PATIENT STATED HISTORY: (As transcribed by Technologist)  Patient with shortness of breath, vomiting and diarrhea.         FINDINGS:    LUNGS:  There are innumerable nodules and masses throughout both lungs.  There is a large confluent soft tissue mass with epicenter right lung base which is seen on lung window  reconstructions series 302, image 58 to measure 5.1 x 4.4 cm.  Large left  lower lobe mass at lung base measures 3.7 x 3.7 cm series 302, image 87. Findings are consistent with diffuse pulmonary metastatic disease.  VASCULATURE:  Pulmonary vessels are unremarkable within the limits of a noncontrast CT.    MEGAN:  A left hilar mass series 301, image 37 measures 3.5 x 1.8 cm.  MEDIASTINUM:  There is extensive mediastinal lymphadenopathy.  Right paratracheal lymph node series 300 image 32 measures 2.7 x 1.7 cm.  Subcarinal lymph node right of midline series 300, image 50 measures 2.2 x 0.5 cm.  CARDIAC:  No enlargement, pericardial thickening, or significant coronary artery calcification.  PLEURA:  No mass or effusion.    THORACIC AORTA:  Unremarkable as seen on non-contrast imaging.  CHEST WALL:  No mass or axillary adenopathy.    LIMITED ABDOMEN:  Diffuse hepatic metastatic disease is noted in described on separately reported abdominal and pelvis CT.  BONES:  No bony lesion or fracture.                     Impression   CONCLUSION:  Innumerable pulmonary masses are noted with large confluent masses at lung bases bilaterally.  There is mediastinal and hilar lymphadenopathy.  Findings are consistent with a diffusely metastatic disease.       Narrative   PROCEDURE:  US PELVIS W DOPPLER (AGU=[id number])     COMPARISON:  [facility] , CT, CT ABDOMEN PELVIS IV CONTRAST, NO ORAL (ER), 1/08/2024, 11:20 AM.     INDICATIONS:  pelvic mass     TECHNIQUE:  Transabdominal imaging was performed of the pelvis.   Arterial and venous Doppler of the ovaries was also performed.  PATIENT STATED HISTORY: (As transcribed by Technologist)           FINDINGS:                UTERUS:  8.01 cm x 4.16 cm x 4.90 cm    Endometrium Thickness: 0.97 cm    The uterus appears normal in size, shape, and echogenicity.  RIGHT OVARY:  3.05 cm x 1.52 cm x 1.21 cm    The right ovary appears normal in size, shape, and echogenicity. No significant masses are  identified.  LEFT OVARY:  10.24 cm x 7.22 cm x 8.89 cm    There is a large, complex mass involving the left ovary as described on recent CT scan, currently measuring 8.0 x 7.4 x 8.1 cm in maximal dimensions.  There is extensive vascularity seen along the periphery of this mass.  CUL-DE-SAC:  Normal.  No fluid or mass.    OTHER:  Negative.       DOPPLER WAVE FORMS  FLOW:  There is normal arterial and venous Doppler wave forms in both ovaries.  The spectral analysis is within normal limits.  OTHER:  Negative.                   Impression   CONCLUSION:    Large left ovarian mass as described on the recent CT scan measuring up to 8.1 cm in maximal dimension, highly concerning for a left ovarian neoplasm.        OBSTETRIC HISTORY:   OB History    Para Term  AB Living   3 0 0 0 0 0   SAB IAB Ectopic Multiple Live Births   0 0 0 0 0   3 vaginal deliveries    SOCIAL HISTORY:  Patient lives at [facility].    Ambulatory Status:  independent  Performance Status:  0 - Fully active, able to carry on all pre-disease performance without restriction.    Social History     Socioeconomic History    Marital status:      Spouse name: Not on file    Number of children: Not on file    Years of education: Not on file    Highest education level: Not on file   Occupational History    Not on file   Tobacco Use    Smoking status: Never    Smokeless tobacco: Never   Vaping Use    Vaping Use: Never used   Substance and Sexual Activity    Alcohol use: Not Currently    Drug use: Never    Sexual activity: Not on file   Other Topics Concern    Not on file   Social History Narrative    Not on file     Social Determinants of Health     Financial Resource Strain: Not on file   Food Insecurity: No Food Insecurity (2024)    Food Insecurity     Food Insecurity: Never true   Transportation Needs: No Transportation Needs (2024)    Transportation Needs     Lack of Transportation: No   Physical Activity: Not on file   Stress:  Not on file   Social Connections: Not on file   Housing Stability: Low Risk  (1/8/2024)    Housing Stability     Housing Instability: No     Housing Instability Emergency: Not on file       FAMILY HISTORY:  Family History   Problem Relation Age of Onset    Breast Cancer Mother 60 - 69       REVIEW OF SYSTEMS:  General ROS: positive for  - chills, fever, and weight loss  Ophthalmic ROS: negative  ENT ROS: positive for - headaches  Cardiovascular ROS: positive for - dyspnea on exertion  Respiratory ROS: positive for - cough and shortness of breath  Gastrointestinal ROS: positive for - abdominal pain, diarrhea, and nausea/vomiting  Genito-Urinary ROS: no dysuria, trouble voiding, or hematuria  Musculoskeletal ROS: negative  Neurological ROS: no TIA or stroke symptoms  Psychological ROS: negative  Endocrine ROS: negative  Hematological and Lymphatic ROS: negative    PHYSICAL EXAMINATION:  /62   Pulse 107   Temp 97.8 °F (36.6 °C) (Oral)   Resp 20   Ht 5' 2\" (1.575 m)   Wt 165 lb (74.8 kg)   LMP  (LMP Unknown)   SpO2 96%   BMI 30.18 kg/m²   Body mass index is 30.18 kg/m².    GENERAL: alert and oriented, cooperative, in no acute distress  HEENT: extra ocular movement intact  THYROID: non-palpable  LYMPH NODES: Cervical, supraclavicular, and axillary nodes normal.  LUNGS: clear to auscultation bilaterally  HEART: regular rate and rhythm  ABDOMEN:  hepatomegaly, distended, firm, tender worse in LLQ  EXTREMITIES: extremities normal, atraumatic, no cyanosis or edema  NEUROLOGIC: motor grossly intact  Previous pelvic exam:  VULVA: no masses or lesions   URETHRA: no masses  VAGINA: no palpable masses  CERVIX: non-palpable  UTERUS: tender, mobile  LEFT ADNEXA: fullness, soft, mobile, tender  RIGHT ADNEXA:  non-palpable  RECTOVAGINAL: deferred  BACK: symmetric, no curvature. ROM normal.     ASSESSMENT:  Dx:  AUB  Anemia  Peritoneal carcinomatosis  Ovarian mass, left    53 year old female with recent history of  cough, nausea, vomiting, diarrhea and shortness of breath for which she presented to the ER . She also notes prior to Sept she was having monthly heavy vaginal bleeding with abdominal cramping. She states in Sept she only had light spotting and she has not had vaginal bleeding since. She notes abdominal distention, loss of appetite and a 10 lb unexplained weight loss.     Abdominal/pelvic CT scan that showed lung and liver nodules, an omental mass, and a left ovarian 8 cm mass.    CT chest shows innumerable pulmonary masses and large confluent masses at lung bases bilaterally.  Mediastinal and hilar adenopathy noted.     : 61  CA 19-9: 150.3  CEA: 203    Pelvic US showing 8 cm left ovarian mass, normal uterus and right ovary    Hgb 6.6, s/p 2 unit RBCs. No sign of source of bleeding.    S/p IR liver biopsy, result pending    The index of suspicion for malignancy is high     Comorbidities:  mother with breast CA dx in 60s    PLAN:    Patient given options of alternative treatment/surgical intervention including chemotherapy (neoadjuvant, NAC) vs. Interval debulking surgery (IDS) given the high suspicion of cancer, pending tissue diagnosis. We dicussed that typical treatment for advanced cancer includes chemotherapy and/or surgical intervention. We would recommend IDS after 3-4 cycles of NAC pending results of biopsy if a gynecologic cancer is found.    Pain control    Seen by Hem Onc - given likely advance disease and pt current status it would be ideal to start chemo inpatient to expedite treatment, pending pathology    Seen by GI - CEA likely elevated 2/2 liver mets, plan for colonoscopy and EGD while pt in house given elevated CEA and concern for blood loss    Case discussed with Hem Onc, GI, and hospitalist    The patient verbalized good understanding of this.  I will keep you informed of her progress.  Thank you for allowing me to participate in the care of this patient.    Nury Rasheed,  PA  01/10/24

## 2024-01-10 NOTE — PROGRESS NOTES
[facility]  Follow up note    Alysia Campos Patient Status:  Inpatient    7/15/1970 MRN [id number]   Location [facility] 4NW-A Attending Harshal Aponte MD   Hosp Day # 2 PCP Renetta Santos DO     Interval History--  Events reviewed from yesterday.  Liver biopsy done per IR around noon on .  She received one unit of PRBCs.    Complaint of abdominal pain, no diarrhea.  Had pelvic ultrasound last night--large complex mass involving L ovary.  8 cm.  Uterus appears normal on ultrasound.  She was seen by GI given elevated CEA--thought to all be from liver mets and no planned colonoscopy currently.      2023  Reason for Consultation:  Likely new GYN malignancy    History of Present Illness:  Alysia Campos is a a(n) 53 year old female that presented to [facility] ED one day ago with cough, fatigue, CAREY for one week pta, and n/v/d x 2 days.    Upon evaluation she was found to be afebrile and hemodynamically stable.  Labs showed WBC 31, hemoglobin 7.    Hx of menorrhagia  x one year with anemia    CT scan a/p showed innumerable metastatic lesions in liver and bilateral lung bases.  Omental based mass in RLQ and L ovarian mass measuring around 8 cm    Note history of occl LLQ pain, abdominal distention, 10 lbs unintentional weight loss.    GynOnc was consulted and IR guided biopsy is planned.  CT chest shows innumerable pulmonary masses and large confluent masses at lung bases bilaterally.  Mediastinal and hilar adenopathy noted.    --61  CA 19-9--150.3          History:  Past Medical History:   Diagnosis Date    Anemia     Migraines      History reviewed. No pertinent surgical history.  Family History   Problem Relation Age of Onset    Breast Cancer Mother 60 - 69      reports that she has never smoked. She has never used smokeless tobacco. She reports that she does not currently use alcohol. She reports that she does not use drugs.    Allergies:  Allergies   Allergen Reactions    Mold TINITUS        Medications:    Current Facility-Administered Medications:     iron sucrose (Venofer) 20 mg/mL injection 200 mg, 200 mg, Intravenous, Daily    HYDROmorphone (Dilaudid) 1 MG/ML injection 0.4 mg, 0.4 mg, Intravenous, Q2H PRN **OR** HYDROmorphone (Dilaudid) 1 MG/ML injection 0.8 mg, 0.8 mg, Intravenous, Q2H PRN **OR** HYDROmorphone (Dilaudid) 1 MG/ML injection 1.2 mg, 1.2 mg, Intravenous, Q2H PRN    sodium chloride 0.9% infusion, , Intravenous, Continuous    naloxone (Narcan) 0.4 MG/ML injection 80 mcg, 80 mcg, Intravenous, PRN    flumazenil (Romazicon) 0.5 mg/5mL injection 0.2 mg, 0.2 mg, Intravenous, PRN    HYDROcodone-acetaminophen (Norco) 5-325 MG per tab 1 tablet, 1 tablet, Oral, Q4H PRN **OR** HYDROcodone-acetaminophen (Norco) 5-325 MG per tab 2 tablet, 2 tablet, Oral, Q4H PRN    dextromethorphan-guaiFENesin (Robitussin-DM)  mg/5mL oral solution 5 mL, 5 mL, Oral, Q6H PRN    acetaminophen (Tylenol Extra Strength) tab 500 mg, 500 mg, Oral, Q4H PRN    ondansetron (Zofran) 4 MG/2ML injection 4 mg, 4 mg, Intravenous, Q6H PRN    prochlorperazine (Compazine) 10 MG/2ML injection 5 mg, 5 mg, Intravenous, Q8H PRN    sodium chloride 0.9% infusion, , Intravenous, Continuous    furosemide (Lasix) 10 mg/mL injection 20 mg, 20 mg, Intravenous, Once    butalbital-acetaminophen-caffeine (Fioricet) -40 MG per tab 1 tablet, 1 tablet, Oral, Q4H PRN    Review of Systems:  A 10-point review of systems was done with pertinent positives and negatives per the HPI.    Physical Exam:   Blood pressure 146/62, pulse 106, temperature 99.2 °F (37.3 °C), temperature source Oral, resp. rate 26, height 5' 2\" (1.575 m), weight 165 lb (74.8 kg), SpO2 96%.   General: Patient is alert and oriented x 3, not in acute distress.   Somnolent   Neck: No JVD. No palpable lymphadenopathy. Neck is supple.   Chest: Clear to auscultation.   Heart: Regular rate and rhythm.    Abdomen: distended.   Extremities: Pedal pulses are present. No  edema.   Neurological: Grossly intact.    Lymphatics: There is no palpable lymphadenopathy throughout in the cervical,            supraclavicular, axillary, or inguinal regions.     Laboratory Data:    Lab Results   Component Value Date    WBC 30.8 01/09/2024    HGB 7.1 01/09/2024    HCT 20.7 01/09/2024    .0 01/09/2024    CREATSERUM 0.75 01/10/2024    BUN 16 01/10/2024     01/10/2024    K 3.9 01/10/2024    CL 99 01/10/2024    CO2 22.0 01/10/2024     01/10/2024    CA 8.7 01/10/2024    PTT 28.4 01/09/2024    INR 1.07 01/09/2024       Imaging:  US PELVIS W DOPPLER (UFB=[id number])    Result Date: 1/9/2024  CONCLUSION:  Large left ovarian mass as described on the recent CT scan measuring up to 8.1 cm in maximal dimension, highly concerning for a left ovarian neoplasm.   LOCATION:  [facility]    Dictated by (CST): Bre Silva DO on 1/09/2024 at 10:16 PM     Finalized by (CST): Bre Silva DO on 1/09/2024 at 10:18 PM       US BIOPSY LIVER (CPT=76942/57236)    Result Date: 1/9/2024  CONCLUSION:  Technically successful ultrasound-guided core biopsy of a representative right hepatic lobe mass.   LOCATION:  [facility]     Dictated by (CST): Ranulfo Doshi MD on 1/09/2024 at 1:03 PM     Finalized by (CST): Ranulfo Doshi MD on 1/09/2024 at 1:04 PM       CT CHEST (LTC=00099)    Result Date: 1/8/2024  CONCLUSION:  Innumerable pulmonary masses are noted with large confluent masses at lung bases bilaterally.  There is mediastinal and hilar lymphadenopathy.  Findings are consistent with a diffusely metastatic disease.  LOCATION:     Dictated by (CST): Melvin dAan MD on 1/08/2024 at 7:53 PM     Finalized by (CST): Melvin Adan MD on 1/08/2024 at 7:58 PM       CT ABDOMEN PELVIS IV CONTRAST, NO ORAL ([facility])    Result Date: 1/8/2024  CONCLUSION:  1. Findings concerning for metastatic disease.  There are innumerable nodules within the visualized lungs as well as innumerable metastatic lesions throughout the liver.   Additionally, there is an omental base mass within the right lower quadrant and a large mass which appears to be emanating from the left ovary measuring 7.8 cm. 2. There is no discrete evidence of an acute intra-abdominal or pelvic process.   LOCATION:  [id number]   Dictated by (CST): Bre Silva DO on 1/08/2024 at 12:17 PM     Finalized by (CST): Bre Silva DO on 1/08/2024 at 12:25 PM       XR CHEST PA + LAT CHEST (CPT=71046)    Result Date: 1/8/2024  CONCLUSION:  1. Suggestion of multiple bilateral pulmonary nodules.  Recommend CT of the chest for further evaluation.   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   She needed one unit PRBCs yesterday.  Hemoglobin improved to 7.1 post PRBCS,  CBC pending this morning.  Agree that pending tissue diagnosis that NAC may be required  5.    Hyponatremia persists    Plan  Await tissue diagnosis.  We noted results may be not be available until early next week.  2.    Venofer began yesterday, 2 further dose planned.  3.    Await CBC today  4.    I will review the case with Gyn Onc today given her pain and overall clinical status.  5.    While she could be discharged to await pathology and start therapy as an outpatient---this may not be expedient enough.   She will need IV access, chemotherapy approval etc... and this may take up to 10 days in the outpatient setting.    It may be prudent for her to stay admitted, get IV access and first cycle of  chemotherapy in house prior to discharge.   6.    Mucinex   7.    Pain control.      Thank you for allowing me to participate in the care of your patient.    Damir Mckeon MD

## 2024-01-10 NOTE — PLAN OF CARE
Patient alert oriented times four, patient recived one unit of blood for hgb of 6.1, tolerted well,repeat hgb 7.1   Patient on o2 at 6 liters per nasal cannula, and upon return from liver biopsy , patient came back with o2 at 4 liters per nasal cannula. Patient o2 saturation at 94 %, dressing dry and intact to,liver biopsy site to right side of abdomen. Medicated twice during shift with antiemetic, had no emesis, had dry heaves. Patient medicated,three times during shift, for abdominal pain of a 10, pain from laft side of abdomen to right lower side, pain med dose i ncreased during shift, at end of shift rated pain a number 3, requests hot pack freqeuntly during shift, needs assist to bathroom gait slow, and apprehasive to ambulate

## 2024-01-10 NOTE — CONSULTS
[facility]    Report of Gastroenterology Consultation    Alysia Campos Patient Status:  Inpatient    7/15/1970 MRN [id number]   Location [facility] 4NW-A Attending Harshal Aponte MD   Hosp Day # 1 PCP Renetta Santos DO     Date of Admission:  2024  Date of Consult:   2024    Reason for Consultation:    Possible colonoscopy due to elevated CEA    History of Present Illness:  Alysia Campos is a a(n) 53 year old female.     This is an unfortunate female who was diagnosed with a 8 cm ovarian mass and suspected liver and lung metastatic lesions.  Patient initially presented with nausea, vomiting, diarrhea and cough.  She was found to have nodules within the liver and lung as well as a 8 cm ovarian mass highly suspicious for neoplasm.  However serological workup showed a CEA of 203, CEA 1961, and CA 19-9 of 150    She was also severely anemic with iron deficiency anemia with an iron saturation of 3%.  She has previously been anemic which was attributed to heavy menses from April to September but she has not had menses since then.  She denies any melena or hematochezia.  She has loose stools chronically.    She has been complaining of lower abdominal pain for the last 1 month and currently requiring Dilaudid      History:  Past Medical History:   Diagnosis Date    Anemia     Migraines      History reviewed. No pertinent surgical history.  Family History   Problem Relation Age of Onset    Breast Cancer Mother 60 - 69      reports that she has never smoked. She has never used smokeless tobacco. She reports that she does not currently use alcohol. She reports that she does not use drugs.    Allergies:  Allergies   Allergen Reactions    Mold TINITUS       Medications:    Current Facility-Administered Medications:     iron sucrose (Venofer) 20 mg/mL injection 200 mg, 200 mg, Intravenous, Daily    HYDROmorphone (Dilaudid) 1 MG/ML injection 0.4 mg, 0.4 mg, Intravenous, Q2H PRN **OR** HYDROmorphone  (Dilaudid) 1 MG/ML injection 0.8 mg, 0.8 mg, Intravenous, Q2H PRN **OR** HYDROmorphone (Dilaudid) 1 MG/ML injection 1.2 mg, 1.2 mg, Intravenous, Q2H PRN    sodium chloride 0.9% infusion, , Intravenous, Continuous    naloxone (Narcan) 0.4 MG/ML injection 80 mcg, 80 mcg, Intravenous, PRN    flumazenil (Romazicon) 0.5 mg/5mL injection 0.2 mg, 0.2 mg, Intravenous, PRN    HYDROcodone-acetaminophen (Norco) 5-325 MG per tab 1 tablet, 1 tablet, Oral, Q4H PRN **OR** HYDROcodone-acetaminophen (Norco) 5-325 MG per tab 2 tablet, 2 tablet, Oral, Q4H PRN    acetaminophen (Tylenol Extra Strength) tab 500 mg, 500 mg, Oral, Q4H PRN    ondansetron (Zofran) 4 MG/2ML injection 4 mg, 4 mg, Intravenous, Q6H PRN    prochlorperazine (Compazine) 10 MG/2ML injection 5 mg, 5 mg, Intravenous, Q8H PRN    sodium chloride 0.9% infusion, , Intravenous, Continuous    furosemide (Lasix) 10 mg/mL injection 20 mg, 20 mg, Intravenous, Once    butalbital-acetaminophen-caffeine (Fioricet) -40 MG per tab 1 tablet, 1 tablet, Oral, Q4H PRN    Review of Systems:  Gastrointestinal: Denies positive test for blood stool, heartburn/indigestion/reflux, belching, difficulty swallowing, irregular bowel habits, painful swallowing, diarrhea, abdominal pain, constipation, nausea, incontinence of stool, vomiting, black stools, get full quickly at meals, blood in stools, abdominal distention, jaundice, flatulence, vomiting blood, bloating, hernia, laxative use, food/milk intolerance, pain with bowel movement, hemorrhoids.  General: Denies fatigue, chills/fever, night sweats, weight loss, loss of appetite, weight gain, sleep disturbance.  Neurological: Denies frequent headaches, history of stroke, recent passing out, recent dizziness, convulsions/seizures, dementia.  Cardiovascular: Denies history of heart murmur, leg swelling, history of rheumatic fever, pacemaker, chest pain or pressure after eating or when upset, automatic defibrillator, angina, other implanted  devices, irregular heart rate/palpitations, high cholesterol or triglycerides, coronary stents.  Respiratory: Denies shortness of breath, chronic/frequent hoarseness, wheezing, exposure to tuberculosis, chronic cough, spitting up blood, cough up sputum, sleep apnea.  Genitourinary: Denies kidney stones, painful/difficult urination, frequent urinary infections, frequent urination, blood in urine, incontinence, kidney failure, prostate problems.  Endocrine: Denies thyroid disease, denies diabetes.  Female complaints: Denies endometriosis, painful menstrual periods, heavy menstrual periods.  Patient is not pregnant.  Psychosocial: Denies history of mental illness, denies usually feeling lonely or depressed, denies history of depression, anxiety, history of physical or sexual abuse, stress, history of eating disorder.  Skin: Denies severe itching, unusual moles, rash, flushing, change in hair or nails.  Bone/joint: Denies arthritis, back pain, joint pain, osteoporosis.  Heme/Lymphatic: Denies easy bruising, anemia, excessive bleeding, enlarging or painful lymph nodes.  Allergy: Denies medication allergy, latex/rubber allergy, anaphylactic or other reaction to anesthesia, food allergy.   Eyes: Denies blurred/double vision, eye disease, glasses or contacts, glaucoma.  ENT: Denies nose or gums bleeding, mouth sores, bad breath or bad taste in mouth, hearing loss.  Physical Exam:    Blood pressure 141/65, pulse 116, temperature 98.5 °F (36.9 °C), temperature source Oral, resp. rate 26, height 5' 2\" (1.575 m), weight 165 lb (74.8 kg), SpO2 92%.    General: Appears alert, oriented x3 and in no acute distress.  HEENT: Normal. No neck vein distention. Thyroid not enlarged.  No lymphadenopathy.  CV: S1 and S2 normal.  No murmurs or gallops.  Lungs: Clear to auscultation.  Abdomen: Soft and nondistended.  Nontender.  No masses.  Bowel sounds are present.  Back: No CVA tenderness.  Extremities: No edema, cyanosis, or  clubbing.  Skin: Warm    Laboratory Data:  Lab Results   Component Value Date    WBC 30.8 01/09/2024    HGB 7.1 01/09/2024    HCT 20.7 01/09/2024    .0 01/09/2024    CREATSERUM 0.81 01/09/2024    BUN 14 01/09/2024     01/09/2024    K 4.0 01/09/2024    CL 98 01/09/2024    CO2 21.0 01/09/2024     01/09/2024    CA 9.0 01/09/2024    PTT 28.4 01/09/2024    INR 1.07 01/09/2024       Imaging:      Assessment/Plan:    Large ovarian mass with suspicious metastatic lesions to the liver and lung as well as omentum.  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  Biopsies of the ovary as well as the liver lesions have been performed.    Elevated CEA most likely from liver metastases.  This is not specific for colorectal neoplasm.    Given patient's degree of immobility and severe abdominal pain and severe fatigue she is in no condition to undergo a bowel preparation or sedation.    Continue treatment for the primary cancer.    Consider PET scan to see if there is any suspicious colorectal neoplasm.    Outpatient colonoscopy can be entertained.    Sign off at this point.  Please call us with questions.  Thank you very much for allowing us to participate in the care of your patient.      Patient Active Problem List   Diagnosis    Hypokalemia    Metastatic malignant neoplasm, unspecified site (HCC)    Hyponatremia    Leukocytosis, unspecified type    Anemia, unspecified type             Ubaldo Morton MD  1/9/2024  7:06 PM

## 2024-01-10 NOTE — DIETARY NOTE
[facility]    NUTRITION ASSESSMENT    Unable to diagnose malnutrition criteria at this time.    NUTRITION INTERVENTION:    RD nutrition Care Plan- See RD nutrition assessment for additional recommendations  Meal and Snacks - Monitor and encourage adequate PO intake.  Medical Food Supplements - Will evaluate need when diet is advanced. Rationale/use for oral supplements discussed.  Vitamin and Mineral Supplements - adding Chewable MVI    PATIENT STATUS: 01/10/24 54yo female admit d/t cough, progressive fatigue/CAREY and N/V/D. CT displayed innumerable metastatic lesions in liver and lungs, along with omental mass in RLQ and left ovarian mass. Pt underwent liver biopsy in IR 1/9/24. Heme/Onc consulted, recommending pt stay for 1st cycle chemotherapy inpatient. Pt anemic, received PRBC this morning.   Attempted to speak with pt at bedside, pt unavailable to obtain history. Will attempt to speak with pt at a later date.     PMH: Anemia      ANTHROPOMETRICS:  Ht: 157.5 cm (5' 2\")  Wt: 74.8 kg (165 lb).   BMI: Body mass index is 30.18 kg/m².  IBW: 50 kg      WEIGHT HISTORY:   Weight loss: JAVAN    Wt Readings from Last 10 Encounters:   01/08/24 74.8 kg (165 lb)        NUTRITION:  Diet:       Procedures    Clear liquid diet Is Patient on Accuchecks? No    NPO      Food Allergies: No  Cultural/Ethnic/Jain Preferences Addressed: Yes    Percent Meals Eaten (last 3 days)       Date/Time Percent Meals Eaten (%)    01/08/24 1700 25 %            GI system review: abdominal pain Last BM: 1/9/24  Skin and wounds: WNL    NUTRITION RELATED PHYSICAL FINDINGS:     1. Body Fat/Muscle Mass:  JAVAN, pt unavailable for visit, will obtain at follow up     2. Fluid Accumulation: none per RN documentation     NUTRITION PRESCRIPTION: 50kg - IBW  Calories: 0378-9399 calories/day (35-40 kcal/kg)  Protein:  grams protein/day (1.5-2.0 grams protein per kg)  Fluid: ~1 ml/kcal or per MD discretion    NUTRITION DIAGNOSIS/PROBLEM:  Increased  nutrient needs related to physiological causes as evidenced by diagnosis consistent with elevated nutritional needs      MONITOR AND EVALUATE/NUTRITION GOALS:  PO intake of 75% of meals TID - New  Weight stable within 1 to 2 lbs during admission - New      MEDICATIONS:  Reviewed    LABS:  Reviewed    Pt is at Moderate nutrition risk      Kyler Rutherford, MS, RDN, LDN  Clinical Dietitian  [id number]

## 2024-01-10 NOTE — PLAN OF CARE
Pt Aox4. VSS. O2 4L NC, Tessalon peals given PRN for cough.   Reported severe abdominal pain. Managed by Dilaudid and Norco PRN.  Pt sleeping but easy to wake up.   Fair appetite.  CT chest done today. PRN Lorazepam administered prior the test.   Hgb 6.6 . 1 Unit PRBC transfused. Tolerated well. Repeat Hgb 7.1.  Colonoscopy prep initiated at 1800. Consent obtained/ in the chart.   Will continue plan of care.       Problem: PAIN - ADULT  Goal: Verbalizes/displays adequate comfort level or patient's stated pain goal  Description: INTERVENTIONS:  - Encourage pt to monitor pain and request assistance  - Assess pain using appropriate pain scale  - Administer analgesics based on type and severity of pain and evaluate response  - Implement non-pharmacological measures as appropriate and evaluate response  - Consider cultural and social influences on pain and pain management  - Manage/alleviate anxiety  - Utilize distraction and/or relaxation techniques  - Monitor for opioid side effects  - Notify MD/LIP if interventions unsuccessful or patient reports new pain  - Anticipate increased pain with activity and pre-medicate as appropriate  Outcome: Progressing     Problem: GASTROINTESTINAL - ADULT  Goal: Minimal or absence of nausea and vomiting  Description: INTERVENTIONS:  - Maintain adequate hydration with IV or PO as ordered and tolerated  - Nasogastric tube to low intermittent suction as ordered  - Evaluate effectiveness of ordered antiemetic medications  - Provide nonpharmacologic comfort measures as appropriate  - Advance diet as tolerated, if ordered  - Obtain nutritional consult as needed  - Evaluate fluid balance  Outcome: Progressing  Goal: Maintains or returns to baseline bowel function  Description: INTERVENTIONS:  - Assess bowel function  - Maintain adequate hydration with IV or PO as ordered and tolerated  - Evaluate effectiveness of GI medications  - Encourage mobilization and activity  - Obtain nutritional  consult as needed  - Establish a toileting routine/schedule  - Consider collaborating with pharmacy to review patient's medication profile  Outcome: Progressing     Problem: HEMATOLOGIC - ADULT  Goal: Maintains hematologic stability  Description: INTERVENTIONS  - Assess for signs and symptoms of bleeding or hemorrhage  - Monitor labs and vital signs for trends  - Administer supportive blood products/factors, fluids and medications as ordered and appropriate  - Administer supportive blood products/factors as ordered and appropriate  Outcome: Progressing

## 2024-01-11 ENCOUNTER — ANESTHESIA EVENT (OUTPATIENT)
Dept: ENDOSCOPY | Facility: HOSPITAL | Age: 54
End: 2024-01-11
Payer: COMMERCIAL

## 2024-01-11 ENCOUNTER — ANESTHESIA (OUTPATIENT)
Dept: ENDOSCOPY | Facility: HOSPITAL | Age: 54
End: 2024-01-11
Payer: COMMERCIAL

## 2024-01-11 PROBLEM — Z71.89 GOALS OF CARE, COUNSELING/DISCUSSION: Status: ACTIVE | Noted: 2024-01-01

## 2024-01-11 PROBLEM — Z51.5 PALLIATIVE CARE BY SPECIALIST: Status: ACTIVE | Noted: 2024-01-01

## 2024-01-11 PROBLEM — G89.3 CANCER RELATED PAIN: Status: ACTIVE | Noted: 2024-01-11

## 2024-01-11 PROBLEM — Z51.5 PALLIATIVE CARE BY SPECIALIST: Status: ACTIVE | Noted: 2024-01-11

## 2024-01-11 PROBLEM — G89.3 CANCER RELATED PAIN: Status: ACTIVE | Noted: 2024-01-01

## 2024-01-11 PROBLEM — Z71.89 GOALS OF CARE, COUNSELING/DISCUSSION: Status: ACTIVE | Noted: 2024-01-11

## 2024-01-11 RX ORDER — SODIUM CHLORIDE, SODIUM LACTATE, POTASSIUM CHLORIDE, CALCIUM CHLORIDE 600; 310; 30; 20 MG/100ML; MG/100ML; MG/100ML; MG/100ML
INJECTION, SOLUTION INTRAVENOUS CONTINUOUS PRN
Status: DISCONTINUED | OUTPATIENT
Start: 2024-01-11 | End: 2024-01-11 | Stop reason: SURG

## 2024-01-11 RX ORDER — LIDOCAINE HYDROCHLORIDE 10 MG/ML
INJECTION, SOLUTION EPIDURAL; INFILTRATION; INTRACAUDAL; PERINEURAL AS NEEDED
Status: DISCONTINUED | OUTPATIENT
Start: 2024-01-11 | End: 2024-01-11 | Stop reason: SURG

## 2024-01-11 RX ADMIN — SODIUM CHLORIDE, SODIUM LACTATE, POTASSIUM CHLORIDE, CALCIUM CHLORIDE: 600; 310; 30; 20 INJECTION, SOLUTION INTRAVENOUS at 14:39:00

## 2024-01-11 RX ADMIN — LIDOCAINE HYDROCHLORIDE 25 MG: 10 INJECTION, SOLUTION EPIDURAL; INFILTRATION; INTRACAUDAL; PERINEURAL at 14:38:00

## 2024-01-11 NOTE — OPERATIVE REPORT
OPERATIVE REPORT   PATIENT NAME: Alysia Campos  MRN: [id number]  DATE OF OPERATION: 1/11/2024  PREOPERATIVE DIAGNOSIS: elevated CEA, ovarian mass, iron deficiency anemia without overt bleeding  POSTOPERATIVE DIAGNOSES   EGD- mild diffuse gastritis  Colonoscopy  5mm sessile TC Polyp   Moderate internal hemorrhoids  PROCEDURE PERFORMED:   Esophagogastroduodenoscopy    Colonoscopy to cecum  SCOPE UTILIZED: Adult Olympus Colonoscope and upper endoscope  WITHDRAWAL TIME= 8 mins  SEDATION MEDICATIONS: MAC; an anesthesiologist was present to provide deep anesthesia  PREPROCEDURE ASSESSMENT: The indication for this procedure is to assess for tumor. The patient was identified by myself and nursing staff in the exam room. Informed consent was obtained. The patient was seen in clinic and a full H&P was obtained. On brief physical examination, airway is patent. Chest is clear. Heart has regular rate and rhythm. Abdomen is soft, nontender with good bowel sounds. A medication list was taken by nursing today and reviewed by myself. The patient is an ASA grade 2.   Due to the technical nature of the procedure, pathology of the anal area could be missed.  UPPER ENDOSCOPY PROCEDURE NOTE:   The procedure was completed without difficulty. The patient tolerated the procedure well. The endoscope was inserted through the mouth and advanced to the level of the duodenum, 3rd portion.  Esophagus appeared normal without stricture or esophagitis.  No hiatal hernia or Oakley's esophagus was noted.  Stomach was normal in the antrum and the body.  Duodenum was normal in the bulb and 2nd duodenum.  Retroflexed view in the stomach revealed normal fundus and cardia.  Gastric biopsies were taken for Helicobacter pylori. There were no immediate complications.   COLONOSCOPY PROCEDURE NOTE:   The procedure was completed without difficulty. The patient tolerated the procedure well. The prep was good. The colonoscope was inserted through the anus and  advanced to the level of the cecum with visualization and photo documentation of the appendix. A slow withdrawal of the colonoscope was performed as well as retroflexion in the rectum. A diminutive TC polyp was not removed due to benign nature and risk of possible bleeding. No other polyps, masses or lesions were found throughout the colon.  Small internal hemorrhoids were noted.  There were no immediate complications.   FINDINGS   No colorectal or gastric/ duodenal neoplasm  ESSIE.  No etiology seen here.  Order celiac serology although no duodenal abnormality seen.  If drops her Hg further may need outpatient small bowel capsule study  RECOMMENDATIONS: Repeat colonoscopy in 3 years.   DISCHARGE: The patient was given an after visit summary detailing the procedure, findings, recommendations, f/u plan and an updated medication list.   PREP Quality indicators:  Excellent: Colon without any residual stool other than clear liquid requiring minimal suction  Very good: Colon with minimal residual stool requiring minimal suction  Good: Colon with very thin layer of stool or small particulate matter that could easily be washed off and suctioned. This may require earlier return for colonoscopy within 3-7 yrs depending on the colonoscopy findings and family history.  Fair: Colon with thick layer of stool or particulate matter that impaired proper visualization of the mucosa. This would require earlier return for colonoscopy within 3 yrs.  Poor: Colon with solid particulate matter that impaired proper visualization of the mucosa. This would require an earlier return for colonoscopy within 1 yr.      Thank you very much for the consultation.  I really appreciate it.    Ubaldo Morton MD

## 2024-01-11 NOTE — ANESTHESIA POSTPROCEDURE EVALUATION
[facility]    Alysia Campos Patient Status:  Inpatient   Age/Gender 53 year old female MRN [id number]   Location [facility] ENDOSCOPY [facility]IN [facility] Attending Harshal Aponte MD   Hosp Day # 3 PCP Renetta Santos DO       Anesthesia Post-op Note    ESOPHAGOGASTRODUODENOSCOPY (EGD)  with biopsy COLONOSCOPY    Procedure Summary       Date: 01/11/24 Room / Location:  ENDOSCOPY 04 /  ENDOSCOPY    Anesthesia Start: 1437 Anesthesia Stop: 1504    Procedures:       ESOPHAGOGASTRODUODENOSCOPY (EGD)  with biopsy COLONOSCOPY      . Diagnosis: (EGD= gastritis         COLON=hemorrhoids)    Surgeons: Ubaldo Morton MD Anesthesiologist: Moris Elise MD    Anesthesia Type: MAC ASA Status: 3            Anesthesia Type: MAC    Vitals Value Taken Time   /68 01/11/24 1504   Temp 97.3 °F (36.3 °C) 01/11/24 1504   Pulse 115 01/11/24 1505   Resp 16 01/11/24 1504   SpO2 91 % 01/11/24 1505   Vitals shown include unfiled device data.    Patient Location: Endoscopy    Anesthesia Type: MAC    Airway Patency: patent    Postop Pain Control: adequate    Mental Status: mildly sedated but able to meaningfully participate in the post-anesthesia evaluation    Nausea/Vomiting: none    Cardiopulmonary/Hydration status: stable euvolemic    Complications: no apparent anesthesia related complications    Postop vital signs: stable    Dental Exam: Unchanged from Preop

## 2024-01-11 NOTE — PLAN OF CARE
Pt Aox4. VSS. O2 4L NC, Tessalon peals given PRN for cough.   Pt still complained about inability to breath, periodically. Repositioning and deep breathing helped for most of the times. Anxious. PRN Lorazepam administered per request. Reported severe abdominal pain. PRN Dilaudid 1.2 mg administered. Patients seemed oversedated. Dose decreased to 0.8 mg q 2hrs. Patient still had periods of oversedation and severe pain.   PCA pump Dilaudid 0.2mg q 10 min with max 1mg/hr initiated per order.  Family at the bedside. POA document signed.     EGD and Colonoscopy completed today. Tolerated well    Will continue plan of care.     Problem: PAIN - ADULT  Goal: Verbalizes/displays adequate comfort level or patient's stated pain goal  Description: INTERVENTIONS:  - Encourage pt to monitor pain and request assistance  - Assess pain using appropriate pain scale  - Administer analgesics based on type and severity of pain and evaluate response  - Implement non-pharmacological measures as appropriate and evaluate response  - Consider cultural and social influences on pain and pain management  - Manage/alleviate anxiety  - Utilize distraction and/or relaxation techniques  - Monitor for opioid side effects  - Notify MD/LIP if interventions unsuccessful or patient reports new pain  - Anticipate increased pain with activity and pre-medicate as appropriate  Outcome: Progressing     Problem: GASTROINTESTINAL - ADULT  Goal: Minimal or absence of nausea and vomiting  Description: INTERVENTIONS:  - Maintain adequate hydration with IV or PO as ordered and tolerated  - Nasogastric tube to low intermittent suction as ordered  - Evaluate effectiveness of ordered antiemetic medications  - Provide nonpharmacologic comfort measures as appropriate  - Advance diet as tolerated, if ordered  - Obtain nutritional consult as needed  - Evaluate fluid balance  Outcome: Progressing  Goal: Maintains or returns to baseline bowel function  Description:  INTERVENTIONS:  - Assess bowel function  - Maintain adequate hydration with IV or PO as ordered and tolerated  - Evaluate effectiveness of GI medications  - Encourage mobilization and activity  - Obtain nutritional consult as needed  - Establish a toileting routine/schedule  - Consider collaborating with pharmacy to review patient's medication profile  Outcome: Progressing     Problem: HEMATOLOGIC - ADULT  Goal: Maintains hematologic stability  Description: INTERVENTIONS  - Assess for signs and symptoms of bleeding or hemorrhage  - Monitor labs and vital signs for trends  - Administer supportive blood products/factors, fluids and medications as ordered and appropriate  - Administer supportive blood products/factors as ordered and appropriate  Outcome: Progressing

## 2024-01-11 NOTE — PROGRESS NOTES
[facility]    Progress Note     Alysia Campos Patient Status:  Inpatient    7/15/1970 MRN [id number]   Location [facility] 4NW-A Attending Harshal Aponte MD   Hosp Day # 3 PCP Renetta Santos DO     Follow up for: The primary encounter diagnosis was Metastatic malignant neoplasm, unspecified site (HCC). Diagnoses of Hyponatremia, Leukocytosis, unspecified type, and Anemia, unspecified type were also pertinent to this visit.      Interval History/Subjective:     Path still pending  1 unit pRBC yesterday, Hgb improved to 7.5   DEBI overnight  Afebrile, HDS, O2 requirements stable     Pain controlled, feeling anxious and sleeping poorly, no other new or worsening symptoms     Vital signs:  Temp:  [97.8 °F (36.6 °C)-98.6 °F (37 °C)] 98.6 °F (37 °C)  Pulse:  [107-116] 116  Resp:  [18-20] 20  BP: (107)/(62) 107/62  SpO2:  [90 %-97 %] 90 %    Physical Exam:      General: No acute distress. Comfortable, nontoxic Appears pale  HEENT: Normocephalic atraumatic. Moist mucous membranes; Sclera anicteric.  Neck: Supple, +JVD  Respiratory: Diminished at bilateral bases, otherwise CTA; ;reg resp rate & effort, no wheezes/crackles   Cardiovascular: S1, S2. Tachycardic. No murmurs appreciable   Chest and Back: No tenderness or deformity.  Abdomen:  mildly distended, +tender mobile mass in LLQ; +hepatomegaly; no ascites, no rebound/guarding  Neurologic: No focal neurological deficits. CNII-XII grossly intact.  Musculoskeletal: Moves all extremities.  Extremities: No edema or cyanosis.  Integument: No rashes or lesions.   Psychiatric: Appropriate mood and affect.       Diagnostic Data:      Labs:  Recent Labs   Lab 24  0954 24  0722 24  1726 01/10/24  0712 01/10/24  1611 24  0704   WBC 31.6* 30.8*  --  35.3*  --  38.7*   HGB 7.0* 6.1* 7.1* 6.6* 7.1* 7.5*   MCV 66.4* 67.6*  --  71.3*  --  70.8*   .0 371.0  --  343.0  --  347.0   BAND 1 2  --  1  --   --    INR  --  1.07  --   --   --   --         Recent Labs   Lab 01/08/24  0954 01/08/24  1250 01/10/24  1611 01/11/24  0006 01/11/24  0704   *   < > 101* 131* 115*   BUN 12   < > 15 13 14   CREATSERUM 0.60   < > 0.68 0.65 0.57   CA 9.1   < > 8.7 9.0 9.3   ALB 3.6  --   --   --  3.1*   *   < > 128* 129* 130*   K 3.5   < > 4.0 3.4* 3.6   CL 92*   < > 99 98 99   CO2 23.0   < > 21.0 24.0 21.0   ALKPHO 294*  --   --   --  211*   AST 75*  --   --   --  96*   ALT 36  --   --   --  39   BILT 0.8  --   --   --  0.9   TP 7.7  --   --   --  7.0    < > = values in this interval not displayed.       Recent Labs   Lab 01/09/24  0722   PTP 13.9   INR 1.07       No results for input(s): \"TROP\", \"CK\" in the last 168 hours.         Imaging: Imaging data reviewed in Epic.    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   Hyponatremia  Acute on Chronic Anemia      Plan    Hypoxia, tachycardia  - BNP elevated may be reflective of volume overload given her persistent IV fluids and recent transfusions  - CT PE negative for PE but with extensive mediastinal and pulmonary tumor burden as the likely cultprit  -- Cont supplemental O2, wean as tolerated     Ovarian Mass  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   -- CT Abd w/ multiple pulmonary, hepatic metastases, omental mass, 7.8cm left ovarian mass  -- CT Chest performed 1/8 w/ innumerable pulmonary nodules  w/ large confluent masses at bilateral bases  -- Gyn Onc consulted, plan discussed  -Oncology consult, recommendations reviewed, pending results of biopsy  - US pelvis unremarkable   -Status post US guided biopsy of liver 1/9/2024, pathology  pending  -Continue Norco, Dilaudid as needed for pain  -1/10: Based on patient's tenuous clinical status, may require first round of chemotherapy as inpatient per oncology, pending initial pathology results    Hyponatremia-improving  -- likely hypovolemic in the setting of poor PO intake, nausea/vomiting  -- Na 126 on admission, s/p 1L NS bolus  -- Correcting appropriately today, continue normal saline at 150 cc/h  -Every 8 hour BMP     Acute on Chronic Anemia   -- recent baseline Hgb 7.7-8.0, 7.0 on admission  -- no e/o active bleeding or hemodynamic instability  -- 1 unit PRBC given 1/9 with minimal response, additional unit ordered today  -- Iron studies consistent with deficiency, Venofer ordered  - CTM, transfuse to maintain goal hemoglobin >7.0  - GI consulted due to persistent anemia, elevated CEA levels, no acute interventions for now    DVT Mechanical Prophylaxis:   SCDs,    DVT Pharmacologic Prophylaxis   Medication   None              Code Status: FULL     Dispo: inpatient; EVA >2 midnights  Plan of care discussed with patient and/or family at bedside.    LINDSAY Aponte MD  [facility]   [phone]      This note was created using voice recognition technology. It may include inadvertent transcriptional errors. Any such errors should be contextually interpreted and should not be taken to alter the content or the meaning.     Note to Patient: The 21st Century Cures Act makes medical notes like these available to patients in the interest of transparency. However, be advised this is a medical document. It is intended as peer to peer communication. It is written in medical language and may contain abbreviations or verbiage that are unfamiliar. It may appear blunt or direct. Medical documents are intended to carry relevant information, facts as evident, and the clinical opinion of the practitioner and not intended to be the primary source of your information.  Please refer directly to myself or  clinical staff for information regarding plan of care.

## 2024-01-11 NOTE — PLAN OF CARE
Patient alert, still in a lot of pain. PRN Dilaudid and Norco given per MAR. Patient with increased SOB and wheezes, hospitalist notified, fluids stopped, ordered Duonebs. Pt on Golytely, tolerated well, almost clear output, bloody. Vitals stable overnight. Plan for colonoscopy/EGD at 13:25. Safety precautions in place, call light within reach, plan of care ongoing.     Problem: PAIN - ADULT  Goal: Verbalizes/displays adequate comfort level or patient's stated pain goal  Description: INTERVENTIONS:  - Encourage pt to monitor pain and request assistance  - Assess pain using appropriate pain scale  - Administer analgesics based on type and severity of pain and evaluate response  - Implement non-pharmacological measures as appropriate and evaluate response  - Consider cultural and social influences on pain and pain management  - Manage/alleviate anxiety  - Utilize distraction and/or relaxation techniques  - Monitor for opioid side effects  - Notify MD/LIP if interventions unsuccessful or patient reports new pain  - Anticipate increased pain with activity and pre-medicate as appropriate  Outcome: Not Progressing     Problem: GASTROINTESTINAL - ADULT  Goal: Maintains or returns to baseline bowel function  Description: INTERVENTIONS:  - Assess bowel function  - Maintain adequate hydration with IV or PO as ordered and tolerated  - Evaluate effectiveness of GI medications  - Encourage mobilization and activity  - Obtain nutritional consult as needed  - Establish a toileting routine/schedule  - Consider collaborating with pharmacy to review patient's medication profile  Outcome: Not Progressing     Problem: HEMATOLOGIC - ADULT  Goal: Maintains hematologic stability  Description: INTERVENTIONS  - Assess for signs and symptoms of bleeding or hemorrhage  - Monitor labs and vital signs for trends  - Administer supportive blood products/factors, fluids and medications as ordered and appropriate  - Administer supportive blood  products/factors as ordered and appropriate  Outcome: Not Progressing

## 2024-01-11 NOTE — ANESTHESIA PREPROCEDURE EVALUATION
PRE-OP EVALUATION    Patient Name: Alysia Campos    Admit Diagnosis: Hyponatremia [E87.1]  Metastatic malignant neoplasm, unspecified site (HCC) [C79.9]  Anemia, unspecified type [D64.9]  Leukocytosis, unspecified type [D72.829]    Pre-op Diagnosis: INPATIENT    ESOPHAGOGASTRODUODENOSCOPY (EGD), COLONOSCOPY    Anesthesia Procedure: ESOPHAGOGASTRODUODENOSCOPY (EGD), COLONOSCOPY  .    Surgeon(s) and Role:     * Ubaldo Morton MD - Primary    Pre-op vitals reviewed.  Temp: 98.6 °F (37 °C)  Pulse: 116  Resp: 20  SpO2: 90 %  Body mass index is 30.18 kg/m².    Current medications reviewed.  Hospital Medications:  • [COMPLETED] potassium chloride 40 mEq in 250mL sodium chloride 0.9% IVPB premix  40 mEq Intravenous Once   • guaiFENesin ER (Mucinex) 12 hr tab 600 mg  600 mg Oral BID   • [COMPLETED] sodium chloride 0.9% infusion   Intravenous Once   • benzonatate (Tessalon) cap 100 mg  100 mg Oral TID PRN   • LORazepam (Ativan) tab 1 mg  1 mg Oral Q6H PRN   • [COMPLETED] furosemide (Lasix) 10 mg/mL injection 20 mg  20 mg Intravenous Once   • [COMPLETED] bisacodyl (Dulcolax) DR tab 20 mg  20 mg Oral Once   • [COMPLETED] polyethylene glycol-electrolyte (Golytely) 236 g oral solution 2,000 mL  2,000 mL Oral Once   • [COMPLETED] polyethylene glycol-electrolyte (Golytely) 236 g oral solution 2,000 mL  2,000 mL Oral Once   • multivitamin (Centrum) chewable tab (Adult) 1 tablet  1 tablet Oral Daily   • [COMPLETED] iopamidol 76% (ISOVUE-370) injection for power injector  100 mL Intravenous ONCE PRN   • ipratropium-albuterol (Duoneb) 0.5-2.5 (3) MG/3ML inhalation solution 3 mL  3 mL Nebulization Q6H PRN   • [COMPLETED] iron sucrose (Venofer) 20 mg/mL injection 200 mg  200 mg Intravenous Daily   • HYDROmorphone (Dilaudid) 1 MG/ML injection 0.4 mg  0.4 mg Intravenous Q2H PRN    Or   • HYDROmorphone (Dilaudid) 1 MG/ML injection 0.8 mg  0.8 mg Intravenous Q2H PRN    Or   • HYDROmorphone (Dilaudid) 1 MG/ML injection 1.2 mg  1.2 mg  Intravenous Q2H PRN   • [] midazolam (Versed) 2 MG/2ML injection 1 mg  1 mg Intravenous Q5 Min PRN   • [] fentaNYL (Sublimaze) 50 mcg/mL injection 50 mcg  50 mcg Intravenous Q5 Min PRN   • HYDROcodone-acetaminophen (Norco) 5-325 MG per tab 1 tablet  1 tablet Oral Q4H PRN    Or   • HYDROcodone-acetaminophen (Norco) 5-325 MG per tab 2 tablet  2 tablet Oral Q4H PRN   • [COMPLETED] potassium chloride (K-Dur) tab 40 mEq  40 mEq Oral Once   • dextromethorphan-guaiFENesin (Robitussin-DM)  mg/5mL oral solution 5 mL  5 mL Oral Q6H PRN   • [COMPLETED] sodium chloride 0.9 % IV bolus 1,000 mL  1,000 mL Intravenous Once   • [COMPLETED] ondansetron (Zofran) 4 MG/2ML injection 4 mg  4 mg Intravenous Once   • [COMPLETED] iopamidol 76% (ISOVUE-370) injection for power injector  100 mL Intravenous ONCE PRN   • [COMPLETED] acetaminophen (Tylenol Extra Strength) tab 1,000 mg  1,000 mg Oral Once   • [COMPLETED] LORazepam (Ativan) tab 0.5 mg  0.5 mg Oral Once PRN   • acetaminophen (Tylenol Extra Strength) tab 500 mg  500 mg Oral Q4H PRN   • ondansetron (Zofran) 4 MG/2ML injection 4 mg  4 mg Intravenous Q6H PRN   • prochlorperazine (Compazine) 10 MG/2ML injection 5 mg  5 mg Intravenous Q8H PRN   • [COMPLETED] sodium chloride 0.9% infusion   Intravenous Once   • [COMPLETED] furosemide (Lasix) 10 mg/mL injection 20 mg  20 mg Intravenous Once   • butalbital-acetaminophen-caffeine (Fioricet) -40 MG per tab 1 tablet  1 tablet Oral Q4H PRN       Outpatient Medications:     Medications Prior to Admission   Medication Sig Dispense Refill Last Dose   • ibuprofen 400 MG Oral Tab Take 1 tablet (400 mg total) by mouth every 6 (six) hours as needed for Pain.   2024       Allergies: Mold      Anesthesia Evaluation    Patient summary reviewed.    Anesthetic Complications           GI/Hepatic/Renal                                 Cardiovascular                (+) obesity                                       Endo/Other                                   Pulmonary                           Neuro/Psych                                    History reviewed. No pertinent surgical history.  Social History     Socioeconomic History   • Marital status:    Tobacco Use   • Smoking status: Never   • Smokeless tobacco: Never   Vaping Use   • Vaping Use: Never used   Substance and Sexual Activity   • Alcohol use: Not Currently   • Drug use: Never     History   Drug Use Unknown     Available pre-op labs reviewed.  Lab Results   Component Value Date    WBC 38.7 (H) 01/11/2024    RBC 3.36 (L) 01/11/2024    HGB 7.5 (L) 01/11/2024    HCT 23.8 (L) 01/11/2024    MCV 70.8 (L) 01/11/2024    MCH 22.3 (L) 01/11/2024    MCHC 31.5 01/11/2024    RDW 20.2 01/11/2024    .0 01/11/2024     Lab Results   Component Value Date     (L) 01/11/2024    K 3.6 01/11/2024    CL 99 01/11/2024    CO2 21.0 01/11/2024    BUN 14 01/11/2024    CREATSERUM 0.57 01/11/2024     (H) 01/11/2024    CA 9.3 01/11/2024     Lab Results   Component Value Date    INR 1.07 01/09/2024         Airway      Mallampati: II  Mouth opening: 3 FB  TM distance: > 6 cm  Neck ROM: full Cardiovascular    Cardiovascular exam normal.  Rhythm: regular  Rate: normal     Dental             Pulmonary    Pulmonary exam normal.                 Other findings          ASA: 3   Plan: MAC  NPO status verified and patient meets guidelines.          Plan/risks discussed with: patient            Present on Admission:  • Hypokalemia

## 2024-01-11 NOTE — PAYOR COMM NOTE
--------------  ADMISSION REVIEW     Payor: [employer] OPEN [employer]ESS   Subscriber #:  [id number]  Authorization Number: [id number]    Admit date: 1/8/24  Admit time:  2:50 PM       REVIEW DOCUMENTATION:  Patient Seen in: [facility] Emergency Department    History     Chief Complaint   Patient presents with    Difficulty Breathing    Nausea/Vomiting/Diarrhea    Anemia     Stated Complaint: shortness of breath and vomiting/diarrhea for last 2 days, anemia hgb 7.7    Subjective:   HPI    Patient comes to the emergency department with symptoms of a mild cough over the past week and now vomiting and diarrhea over the past 2 days.  The patient states that she is somewhat short of breath, but is always somewhat short of breath because of her quite significant anemia which has been an ongoing problem for her after she had substantial volumes of vaginal bleeding last year.  She describes no new bleeding.  She states that she has been vomiting and having diarrhea approximately every 3 hours over the past 24 hours.  She states that she is unable to tolerate any oral intake.  There is no blood in either her emesis or her watery stools.  She has no abdominal pain.  She has no fever or chills.    Objective:   Past Medical History:   Diagnosis Date    Anemia          Review of Systems    Positive for stated complaint: shortness of breath and vomiting/diarrhea for last 2 days, anemia hgb 7.7    Physical Exam     ED Triage Vitals [01/08/24 0946]   BP (!) 163/89   Pulse 105   Resp (!) 32   Temp    Temp src    SpO2 97 %   O2 Device None (Room air)       Current:BP (!) 162/79   Pulse 111   Resp (!) 32   Ht 157.5 cm (5' 2\")   Wt 74.8 kg   LMP  (LMP Unknown)   SpO2 95%   BMI 30.18 kg/m²     Physical Exam  Vitals and nursing note reviewed.   Constitutional:       Appearance: She is well-developed.   HENT:      Head: Normocephalic.   Cardiovascular:      Rate and Rhythm: Regular rhythm. Tachycardia present.      Pulses:            Radial pulses are 2+ on the right side and 2+ on the left side.        Dorsalis pedis pulses are 2+ on the right side and 2+ on the left side.        Posterior tibial pulses are 2+ on the right side and 2+ on the left side.      Heart sounds: Normal heart sounds. No murmur heard.  Pulmonary:      Effort: Pulmonary effort is normal. No respiratory distress.      Breath sounds: Normal breath sounds. No decreased breath sounds or wheezing.      Comments: No current active coughing.  Abdominal:      General: Bowel sounds are increased.      Palpations: Abdomen is soft. There is hepatomegaly.      Tenderness: There is no rebound.      Comments: Mildly tender hepatomegaly noted. No peritoneal findings.   Musculoskeletal:         General: No tenderness. Normal range of motion.      Cervical back: Normal range of motion and neck supple.   Lymphadenopathy:      Cervical: No cervical adenopathy.   Skin:     General: Skin is warm and dry.      Findings: No rash.   Neurological:      Mental Status: She is alert and oriented to person, place, and time.      Sensory: No sensory deficit.     ED Course     Labs Reviewed   COMP METABOLIC PANEL (14) - Abnormal; Notable for the following components:       Result Value    Glucose 106 (*)     Sodium 124 (*)     Chloride 92 (*)     Calculated Osmolality 258 (*)     AST 75 (*)     Alkaline Phosphatase 294 (*)     A/G Ratio 0.9 (*)     All other components within normal limits   MANUAL DIFFERENTIAL - Abnormal; Notable for the following components:    Neutrophil Absolute Manual 26.86 (*)     Monocyte Absolute Manual 1.90 (*)     Basophil Absolute Manual 0.95 (*)     Metamyelocyte Absolute Manual 0.32 (*)     All other components within normal limits   URINALYSIS WITH CULTURE REFLEX - Abnormal; Notable for the following components:    Ketones Urine 10 (*)     Protein Urine 20 (*)     All other components within normal limits   CBC W/ DIFFERENTIAL - Abnormal; Notable for the following  components:    WBC 31.6 (*)     RBC 3.48 (*)     HGB 7.0 (*)     HCT 23.1 (*)     MCV 66.4 (*)     MCH 20.1 (*)     MCHC 30.3 (*)     Neutrophil Absolute Prelim 25.08 (*)         Time: 01/08 1210  Value: CT ABDOMEN PELVIS IV CONTRAST, NO ORAL (ER)  Comment: Multiple metastatic lesions in liver, peritoneum and lower portions of the lung.     MDM      Patient comes in the emergency department with cough, diarrhea and vomiting.  Patient's differential included pneumonia and acute viral infection.  However, patient's workup revealed significant hyponatremia which appears to be acute along with significant leukocytosis.  Patient's abdominal exam also revealed quite significant hepatomegaly.  Because of these findings, CT scan was performed to evaluate for acute intra-abdominal process such as mass or abscess.  The CT scan revealed findings suggestive of metastatic disease diffusely throughout the entire abdomen.  Of note, a possible ovarian mass was noted.  Patient was hospitalized for acute treatment of her hyponatremia.  Gynecologic oncology was also consulted.  Admission disposition: 1/8/2024 12:35 PM      Disposition and Plan     Clinical Impression:  1. Metastatic malignant neoplasm, unspecified site (HCC)    2. Hyponatremia    3. Leukocytosis, unspecified type    4. Anemia, unspecified type       Disposition:  Admit  1/8/2024 12:35 pm      Chief Complaint:       Chief Complaint   Patient presents with    Difficulty Breathing    Nausea/Vomiting/Diarrhea    Anemia         History of Present Illness: Alysia Campos is a 53 year old female with hx anemia who presented to the ED for further evaluation of cough, progressive fatigue/CAREY x1 week and N/V/D x 2 days. She  was directed to the ED by her PCP for further evaluation.      On arrival to the ED she was afebrile & hemodynamically stable. SpO2 97% on room air. Labs notable for WBC 31, Hgb 7.0, Na 126, . CT Abd/Pelvis was obtained which showed innumerable  metastatic lesions in the liver and bilateral lung base, with omental base mass in the RLQ and a left ovarian mass measuring 7.8cm. She was given 1L NS and Gynecologic Oncology was consulted.      Following admission she denies any new or worsening symptoms. On ROS she endorses significant menorrhagia x1 year with resultant anemia.      Past Medical History:       Past Medical History:   Diagnosis Date    Anemia           Past Surgical History: History reviewed. No pertinent surgical history.     Social History:  reports that she has never smoked. She has never used smokeless tobacco. She reports that she does not currently use alcohol. She reports that she does not use drugs.      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   -- CT Abd w/ multiple pulmonary, hepatic metastases, omental mass, 7.8cm left ovarian mass  -- Check CT Chest to complete staging   -- Gyn Onc consulted, plan discussed, rec Onc consult  -- IR consulted for CT Guided biopsy of mass; NPO at midnight, hold dvt chemoppx      Hyponatremia  -- likely hypovolemic in the setting of poor PO intake, nausea/vomiting  -- Na 126 on admission, s/p 1L NS bolus  -- cont NS at 150cc/h, check q4h BMP to ensure correction no faster than 0.5 mEq/h     Acute on Chronic Anemia   -- recent baseline Hgb 7.7-8.0, 7.0 on admission  -- no e/o active bleeding or hemodynamic instability  -- given trend will transfuse 1 u pRBC  -- check iron studies, venofer if deficient      DVT Mechanical Prophylaxis:   SCDs,   no chemoppx given anemia, anticipated procedures          Code Status: FULL      Dispo: inpatient; EVA >  2 midnights      Plan of care discussed with patient and/or family at bedside.     LINDSAY Aponte MD  [facility]       Hematology 1/9/24  elin for Consultation:  Likely new GYN malignancy     History of Present Illness:  Alysia Campos is a a(n) 53 year old female that presented to [facility] ED one day ago with cough, fatigue, CAREY for one week pta, and n/v/d x 2 days.    Upon evaluation she was found to be afebrile and hemodynamically stable.  Labs showed WBC 31, hemoglobin 7.     Hx of menorrhagia  x one year with anemia     CT scan a/p showed innumerable metastatic lesions in liver and bilateral lung bases.  Omental based mass in RLQ and L ovarian mass measuring around 8 cm     Note history of occl LLQ pain, abdominal distention, 10 lbs unintentional weight loss.     GynOnc was consulted and IR guided biopsy is planned.  CT chest shows innumerable pulmonary masses and large confluent masses at lung bases bilaterally.  Mediastinal and hilar adenopathy noted.     --61  CA 19-9--150.3       maging:  CT CHEST (CPT=71250)     Result Date: 1/8/2024  CONCLUSION:  Innumerable pulmonary masses are noted with large confluent masses at lung bases bilaterally.  There is mediastinal and hilar lymphadenopathy.  Findings are consistent with a diffusely metastatic disease.  LOCATION:     Dictated by (CST): Melvin Adan MD on 1/08/2024 at 7:53 PM     Finalized by (CST): Melvin Adan MD on 1/08/2024 at 7:58 PM        CT ABDOMEN PELVIS IV CONTRAST, NO ORAL (ER)     Result Date: 1/8/2024  CONCLUSION:  1. Findings concerning for metastatic disease.  There are innumerable nodules within the visualized lungs as well as innumerable metastatic lesions throughout the liver.  Additionally, there is an omental base mass within the right lower quadrant and a large mass which appears to be emanating from the left ovary measuring 7.8 cm. 2. There is no discrete evidence of an acute intra-abdominal or pelvic process.    LOCATION:  [id number]   Dictated by (CST): Bre Silva DO on 1/08/2024 at 12:17 PM     Finalized by (CST): Bre Silva DO on 1/08/2024 at 12:25 PM        XR CHEST PA + LAT CHEST (CPT=71046)     Result Date: 1/8/2024  CONCLUSION:  1. Suggestion of multiple bilateral pulmonary nodules.  Recommend CT of the chest for further evaluation.   LOCATION:  [facility]   Dictated by (CST): Deyanira Whipple MD on 1/08/2024 at 10:27 AM     Finalized by (CST): Deyanira Whipple MD on 1/08/2024 at 10:29 AM          Impression and Plan:        Patient Active Problem List   Diagnosis    Hypokalemia    Metastatic malignant neoplasm, unspecified site (HCC)    Hyponatremia    Leukocytosis, unspecified type    Anemia, unspecified type         Impression  Metastatic malignancy based on imaging, very likely ovarian based on data set  Omental mass biopsy pending later today.  Anemia, likely multifactorial with components of ESSIE, underlying disease  Agree that pending tissue diagnosis that NAC will be required     Plan  Await tissue diagnosis.  We noted results may be not be available until early next week.  2.    IV iron ordered with Venofer to begin post biopsy.     Thank you for allowing me to participate in the care of your patient.     Damir Mckeon MD  1/9/2024 1/9/24  Ob Gyn     ASSESSMENT:  Dx:  AUB  Anemia  Peritoneal carcinomatosis  Ovarian mass, left     53 year old female with recent history of cough, nausea, vomiting, diarrhea and shortness of breath for which she presented to the ER . She also notes prior to Sept she was having monthly heavy vaginal bleeding with abdominal cramping. She states in Sept she only had light spotting and she has not had vaginal bleeding since. She notes abdominal distention, loss of appetite and a 10 lb unexplained weight loss.      Abdominal/pelvic CT scan that showed lung and liver nodules, an omental mass, and a left ovarian 8 cm mass.     CT chest shows innumerable pulmonary masses and large confluent masses  at lung bases bilaterally.  Mediastinal and hilar adenopathy noted.     : 61  CA 19-9: 150.3  CEA: 203     The index of suspicion for malignancy is high      Comorbidities:  mother with breast CA dx in 60s     PLAN:    Patient given options of alternative treatment/surgical intervention including chemotherapy (neoadjuvant, NAC) vs. Interval debulking surgery (IDS) given the high suspicion of cancer, pending tissue diagnosis. We dicussed that typical treatment for advanced cancer includes chemotherapy and/or surgical intervention. We would recommend IDS after 3-4 cycles of NAC pending results of biopsy if a gynecologic cancer is found.     Pelvic US to evaluate source of pelvic mass     IR biopsy of omentum vs liver, per IR liver biopsy would be best     Pain control     Seen by Hem Onc given possible advance disease for chemotherapy consideration     Referral placed for GI consult for colonoscopy and EGD while pt in house given elevated CEA      Hospitalist 1/9/24    1 unit unit PRBC transfusion not given yesterday.  Hemoglobin 6.1 this morning  DEBI overnight  Afebrile, HDS     No bleeding or bruising noted since admission.  Has not had a BM since admission.  Intermittent cramping abdominal pain relieved with higher dose of Dilaudid.  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   -- CT Abd w/ multiple pulmonary, hepatic metastases, omental mass, 7.8cm left ovarian mass  -- CT Chest performed 1/8 w/ innumerable pulmonary nodules  w/ large confluent masses at bilateral bases  -- Gyn Onc consulted, plan discussed  -Oncology consult, recommendations reviewed, pending results of biopsy  -US pelvis pending  -- IR consulted, recommending ultrasound-guided biopsy of  liver, pending  -Continue Norco, Dilaudid as needed for pain     Hyponatremia-improving  -- likely hypovolemic in the setting of poor PO intake, nausea/vomiting  -- Na 126 on admission, s/p 1L NS bolus  -- Correcting appropriately today, continue normal saline at 150 cc/h  -Every 8 hour BMP     Acute on Chronic Anemia   -- recent baseline Hgb 7.7-8.0, 7.0 on admission  -- no e/o active bleeding or hemodynamic instability  -- 1 unit PRBC transfusion ordered 1/8, pending  -- Iron studies consistent with deficiency, Venofer ordered  -CTM, transfuse to maintain goal hemoglobin >7.0       GI 1/9/24    Date of Admission:  1/8/2024  Date of Consult:   1/9/2024     Reason for Consultation:     Possible colonoscopy due to elevated CEA     History of Present Illness:  Alysia Campos is a a(n) 53 year old female.      This is an unfortunate female who was diagnosed with a 8 cm ovarian mass and suspected liver and lung metastatic lesions.  Patient initially presented with nausea, vomiting, diarrhea and cough.  She was found to have nodules within the liver and lung as well as a 8 cm ovarian mass highly suspicious for neoplasm.  However serological workup showed a CEA of 203, CEA 1/25/1961, and CA 19-9 of 150     She was also severely anemic with iron deficiency anemia with an iron saturation of 3%.  She has previously been anemic which was attributed to heavy menses from April to September but she has not had menses since then.  She denies any melena or hematochezia.  She has loose stools chronically.     She has been complaining of lower abdominal pain for the last 1 month and currently requiring Dilaudid      Assessment/Plan:     Large ovarian mass with suspicious metastatic lesions to the liver and lung as well as omentum.  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  Biopsies of the ovary as well as the liver lesions have been performed.     Elevated CEA most likely from liver metastases.  This is not  specific for colorectal neoplasm.     Given patient's degree of immobility and severe abdominal pain and severe fatigue she is in no condition to undergo a bowel preparation or sedation.     Continue treatment for the primary cancer.     Consider PET scan to see if there is any suspicious colorectal neoplasm.     Outpatient colonoscopy can be entertained.     Sign off at this point.  Please call us with questions.  Thank you very much for allowing us to participate in the care of your patient.            Patient Active Problem List   Diagnosis    Hypokalemia    Metastatic malignant neoplasm, unspecified site (HCC)    Hyponatremia    Leukocytosis, unspecified type    Anemia, unspecified type        1/10/24 Hematology      Interval History--  Events reviewed from yesterday.  Liver biopsy done per IR around noon on 1/9.  She received one unit of PRBCs.     Complaint of abdominal pain, no diarrhea.  Had pelvic ultrasound last night--large complex mass involving L ovary.  8 cm.  Uterus appears normal on ultrasound.  She was seen by GI given elevated CEA--thought to all be from liver mets and no planned colonoscopy currently.        1/9/2023  Reason for Consultation:  Likely new GYN malignancy    Impression  Metastatic malignancy based on imaging, very likely ovarian based on data set  Liver biopsy was done yesterday per IR  Anemia, likely multifactorial with components of ESSIE, underlying disease.   She needed one unit PRBCs yesterday.  Hemoglobin improved to 7.1 post PRBCS,  CBC pending this morning.  Agree that pending tissue diagnosis that NAC may be required  5.    Hyponatremia persists     Plan  Await tissue diagnosis.  We noted results may be not be available until early next week.  2.    Venofer began yesterday, 2 further dose planned.  3.    Await CBC today  4.    I will review the case with Gyn Onc today given her pain and overall clinical status.    1/10/24 Hopsitalist    Biopsy well tolerated, path pending  Hgb  6.4 this am, repeat transfusion pending      DEBI overnight  Afebrile, BP stable but now tachycardic, O2 requirements 4-6LPM     Having worsening shortness of breath secondary to cough, with persistent abdominal pain.    Hypoxia, tachycardia  - BNP elevated may be reflective of volume overload given her persistent IV fluids and recent transfusions  - Will give 20 mg IV Lasix following transfusion today (below)  - Check CT PE given increased risk for VTE and patient currently not on chemical DVT prophylaxis given severe anemia  Acute on Chronic Anemia   -- recent baseline Hgb 7.7-8.0, 7.0 on admission  -- no e/o active bleeding or hemodynamic instability  -- 1 unit PRBC given 1/9 with minimal response, additional unit ordered today  -- Iron studies consistent with deficiency, Venofer ordered  - CTM, transfuse to maintain goal hemoglobin >7.0  - GI consulted due to persistent anemia, elevated CEA levels, no acute interventions for now       1/10/24 Ob Gyn  PLAN:    Patient given options of alternative treatment/surgical intervention including chemotherapy (neoadjuvant, NAC) vs. Interval debulking surgery (IDS) given the high suspicion of cancer, pending tissue diagnosis. We dicussed that typical treatment for advanced cancer includes chemotherapy and/or surgical intervention. We would recommend IDS after 3-4 cycles of NAC pending results of biopsy if a gynecologic cancer is found.     Pain control     Seen by Hem Onc - given likely advance disease and pt current status it would be ideal to start chemo inpatient to expedite treatment, pending pathology     Seen by GI - CEA likely elevated 2/2 liver mets, plan for colonoscopy and EGD while pt in house given elevated CEA and concern for blood loss     Case discussed with Hem Onc, GI, and hospitalist     The patient verbalized good understanding of this.  I will keep you informed of her progress.  Thank you for allowing me to participate in the care of this patient.      MAGNOLIA Johnson      Nursing      01/11/24 0831 01/11/24 0901 01/11/24 1130   Pain Assessment: Ongoing & Relief (q4h & relief)   Pain Assessment Tool 0-10 0-10 0-10   0-10 Scale 10 (severe pain) 5 (moderate pain) 9 (severe pain)   Pain Intervention(s) Medication  --   --            1/11/24 Hematology    Interval History--  Events reviewed from yesterday.  She received one further unit of PRBCs.  Liver biopsy done per IR around noon on 1/9.  Prelim pathology reviewed with Dr. Trivedi twice yesterday--malignancy confirmed, probable carcinoma based on staining thus far.  Not clearly c/w ovarian cancer.  Is being sent to [facility] for review.  IHC for germ cell tumor negative.     Situation has been reviewed with Dr. Morton again and he plans to consider EGD/colonoscopy during admission given CEA.    Impression  Metastatic malignancy based on imaging, unclear primary lesion.   Widely metastatic with lymphangitic spread in lungs, liver metastases, pelvic metastases, ovarian mass.  Liver biopsy was done 1/9 per IR.  Prelim pathology is malignant, but not much further clarification is yet available.   Probably carcinoma based on IHC.  Being sent to [facility]  Anemia, likely multifactorial with components of ESSIE, underlying disease.   She needed one unit PRBCs yesterday.  Hemoglobin improved to 7.1 post PRBCS,  CBC pending this morning.  Tachycardia, SOB.   CTA negative for PE     Plan  Await tissue diagnosis.  We noted results may be not be available until early next week.  Has been sent to [facility].   Check AFP, bHCG after discussion with Dr. Trivedi yesterday.  2.    Venofer--3rd dose today.  Transfuse PRBCs as needed.     3.    Await CBC today  4.    I will review the case with Gyn Onc today given her pain and overall clinical status.  5.    Given persistent anemia and CEA findings during admission, Dr. Morton planning endoscopic evaluation as in patient.   6.    Extensive review of case with Dr. Trivedi x 2 yesterday.   Thus far  pathology shows malignancy and most likely a carcinoma.   Further characterization pending IHC pattern and may not be feasible.    Case to be sent to [facility] and likely NGS for molecular testing.   Not clearly consistent with ovarian cancer based on data set thus far.   7.    Palliative care consult for aid in pain control rec.           Latest Reference Range & Units 01/08/24 09:54 01/08/24 12:50 01/08/24 15:07 01/08/24 19:48 01/09/24 07:22 01/09/24 17:26 01/10/24 00:19   Glucose 70 - 99 mg/dL 106 (H) 99 98 98 101 (H) 122 (H) 111 (H)   Sodium 136 - 145 mmol/L 124 (L) 126 (L) 126 (L) 129 (L) 129 (L) 129 (L) 129 (L)   Potassium 3.5 - 5.1 mmol/L 3.5 3.5 3.5 3.4 (L) 3.3 (L) 4.0 3.9   Chloride 98 - 112 mmol/L 92 (L) 95 (L) 95 (L) 95 (L) 98 98 99   Carbon Dioxide, Total 21.0 - 32.0 mmol/L 23.0 21.0 20.0 (L) 21.0 21.0 21.0 22.0   BUN 9 - 23 mg/dL 12 9 9 9 12 14 16   CREATININE 0.55 - 1.02 mg/dL 0.60 0.68 0.62 0.66 0.69 0.81 0.75   (H): Data is abnormally high  (L): Data is abnormally low         Latest Reference Range & Units 01/08/24 09:54 01/09/24 07:22 01/09/24 17:26 01/10/24 07:12 01/10/24 16:11 01/11/24 07:04   WBC 4.0 - 11.0 x10(3) uL 31.6 (H) 30.8 (H)  35.3 (H)  38.7 (H)   Hemoglobin 12.0 - 16.0 g/dL 7.0 (L) 6.1 (LL) 7.1 (L) 6.6 (LL) 7.1 (L) 7.5 (L)   Hematocrit 35.0 - 48.0 % 23.1 (L) 20.7 (L)  22.1 (L)  23.8 (L)   Platelet Count 150.0 - 450.0 10(3)uL 406.0 371.0  343.0  347.0   (LL): Data is critically low  (H): Data is abnormally high  (L): Data is abnormally low        MEDICATIONS ADMINISTERED IN LAST 1 DAY:  benzonatate (Tessalon) cap 100 mg       Date Action Dose Route User    1/11/2024 1607 Given 100 mg Oral Kathryn Bass RN          multivitamin (Centrum) chewable tab (Adult) 1 tablet       Date Action Dose Route User    1/11/2024 0848 Given 1 tablet Oral Kathryn Bass RN          dextromethorphan-guaiFENesin (Robitussin-DM)  mg/5mL oral solution 5 mL       Date Action Dose Route User     1/11/2024 0307 Given 5 mL Oral Denita Angeles RN    1/10/2024 2009 Given 5 mL Oral Denita Angeles RN          guaiFENesin ER (Mucinex) 12 hr tab 600 mg       Date Action Dose Route User    1/11/2024 0832 Given 600 mg Oral Kathryn Bass RN    1/10/2024 2009 Given 600 mg Oral Denita Angeles RN          HYDROcodone-acetaminophen (Norco) 5-325 MG per tab 1 tablet       Date Action Dose Route User    1/10/2024 2316 Given 1 tablet Oral Denita Angeles RN          HYDROmorphone (Dilaudid) 1 MG/ML injection 0.8 mg       Date Action Dose Route User    1/11/2024 1655 Given 0.8 mg Intravenous Kathryn Bass RN    1/11/2024 1323 Given 0.8 mg Intravenous Kathryn Bass RN    1/11/2024 1130 Given 0.8 mg Intravenous Kathryn Bass RN          HYDROmorphone (Dilaudid) 1 MG/ML injection 1.2 mg       Date Action Dose Route User    1/11/2024 0831 Given 1.2 mg Intravenous Kathryn Bass RN    1/11/2024 0631 Given 1.2 mg Intravenous Denita Angeles RN    1/11/2024 0307 Given 1.2 mg Intravenous Denita Angeles RN    1/10/2024 2032 Given 1.2 mg Intravenous Denita Angeles RN          ipratropium-albuterol (Duoneb) 0.5-2.5 (3) MG/3ML inhalation solution 3 mL       Date Action Dose Route User    1/11/2024 0008 Given 3 mL Nebulization Nora Dailey RCP          iron sucrose (Venofer) 20 mg/mL injection 200 mg       Date Action Dose Route User    1/11/2024 0832 New Bag 200 mg Intravenous Kathryn Bass RN          lactated ringers infusion       Date Action Dose Route User    1/11/2024 1439 New Bag (none) Intravenous Moris Elise MD          lidocaine PF (Xylocaine-MPF) 1% injection       Date Action Dose Route User    1/11/2024 1438 Given 25 mg Intravenous PrzybylMoris MD          LORazepam (Ativan) tab 1 mg       Date Action Dose Route User    1/11/2024 1005 Given 1 mg Oral Kathryn Bass RN    1/10/2024 2133 Given 1 mg Oral Denita Angeles RN          polyethylene  glycol-electrolyte (Golytely) 236 g oral solution 2,000 mL       Date Action Dose Route User    1/11/2024 0635 Given 2,000 mL Oral Denita Angeles RN          polyethylene glycol-electrolyte (Golytely) 236 g oral solution 2,000 mL       Date Action Dose Route User    1/10/2024 1752 Given 2,000 mL Oral Kathryn Bass RN          potassium chloride 40 mEq in 250mL sodium chloride 0.9% IVPB premix       Date Action Dose Route User    1/11/2024 0832 New Bag 40 mEq Intravenous Kathryn Bass RN          propofol (Diprivan) 10 MG/ML injection       Date Action Dose Route User    1/11/2024 1438 Given 50 mg Intravenous Moris Elise MD          propofol (Diprivan) 10 mg/mL infusion premix       Date Action Dose Route User    1/11/2024 1442 New Bag 50 mcg/kg/min × 74.8 kg Intravenous PrMoris chau MD            Vitals (last day)       Date/Time Temp Pulse Resp BP SpO2 Weight O2 Device O2 Flow Rate (L/min) [facility]    01/11/24 1540 -- 112 -- 120/55 95 % -- -- --     01/11/24 1530 -- 120 20 142/104 93 % -- -- -- ES    01/11/24 1525 -- 113 20 119/71 93 % -- -- -- ES    01/11/24 1520 -- 117 20 122/67 91 % -- -- -- ES    01/11/24 1515 -- 111 18 121/56 94 % -- -- -- ES    01/11/24 1510 -- 114 16 116/47 93 % -- -- -- ES    01/11/24 1504 -- -- -- -- -- -- Nasal cannula 6 L/min ES    01/11/24 1504 97.3 °F (36.3 °C) 115 16 123/68 95 % -- -- -- ADDIE    01/11/24 0550 98.6 °F (37 °C) 116 20 -- 90 % -- Nasal cannula -- KK    01/11/24 0008 -- 111 20 -- 97 % -- Nasal cannula 5 L/min TK    01/10/24 2038 98.1 °F (36.7 °C) -- 18 -- -- -- Nasal cannula -- MM    01/10/24 1314 97.8 °F (36.6 °C) 107 20 107/62 96 % -- -- --     01/10/24 1215 -- 115 -- 140/83 92 % -- -- --     01/10/24 1214 98.4 °F (36.9 °C) -- 24 -- -- -- -- --     01/10/24 1130 99.4 °F (37.4 °C) -- 24 -- -- -- -- --     01/10/24 1101 99.4 °F (37.4 °C) 109 24 129/66 99 % -- -- --     01/10/24 1100 99.5 °F (37.5 °C) -- -- -- -- -- -- --     01/10/24  1038 98.7 °F (37.1 °C) -- -- -- -- -- -- -- MK    01/10/24 0751 -- -- -- -- 97 % -- Nasal cannula 4 L/min AF    01/10/24 0345 -- 106 -- 146/62 96 % -- Nasal cannula 4 L/min JW          CIWA Scores (since admission)       None          Blood Transfusion Record       Product Unit Status Volume Start End            Transfuse RBC       23  [id number]  [id number] Completed 01/10/24 1317 400 mL 01/10/24 1038 01/10/24 1317       23  [id number]  [id number] Transfusing  01/09/24 0830

## 2024-01-11 NOTE — CONSULTS
[facility]  Palliative Care Initial Consult Note    Alysia Campos Patient Status:  Inpatient    7/15/1970 MRN [id number]   Location [facility] 4NW-A Attending Harshal Aponte MD   Hosp Day # 3 PCP Renetta Santos DO     Date of Consult: 2024  Patient seen at: [facility] In[facility]    Reason for Consultation: Consult ordered by:: Dr. Mckeon for evaluation of Palliative Care needs and Uncontrolled symptoms.    Subjective     History of Present Illness: Alysia Campos is a 53 year old female with migraines who was admitted on 2024 for cough, N/V/D. Work up in our hospital revealed CT scan with lung and liver nodules, an omental mass, and a left ovarian 8 cm mass and labs significant for iron deficiency anemia (Hgb 7.0), leukocytosis, elevated AST/Alk phos, hyponatremia (Na 124), and elevated tumor markers //CEA. Liver bx on  with documented prelim results per oncology that is suggestive of malignancy. Anemia s/p 2 units pRBCs and venofer x3 days.  History was obtained from Epic and patient.      Today is day #3 of hospitalization.     When I entered the room, the patient was awake, sitting up in bed, and holding hot packs to her abdomen . No family present at bedside.     Pt states that she \"had her bottom fall out\" referring to her life alerting diagnosis. She is very drowsy and states that her vision has been poor over the past 24 hours and is requesting I type \"colonoscopy today\" on her phone. Blurred vision and inability to concentrate.     Pains began about 1-2 months ago in her back/right shoulder that she originally attributed to excessive gardening and walking her large dog. She went to PT and used flexeril with little relief. She was unable to tell me more about her acute abdominal pain onset as she is easily distracted. Her pain originates in the abdomen LLQ and radiates throughout her lower abdomen. She currently has heat packs on that help somewhat. The IV dilaudid she  receives decreases her pain from 7-10/10 to 2/10.   Over the past 24 hours, pain has been documented at 4-10/10. She has received Nocroc 10mg + IVP dilaudid 7.6mg for a total of 154 OME. She has also use PO ativan for anxiety x2, last given at 10 AM. IL  reviewed and only med is flexeril 80 tablets since 11/9/23.     Encounter ended early by pt's request as she wanted pain medication, commode, and rest until her colonoscopy later today.      Review of Systems:   Symptoms(s): Anxiety;Cough;Drowsiness;Dyspnea;Fatigue;Pain;Weight loss  Bowel Movement         1/11/2024  1002             Stool Count Calculated for I/O: 1          Wt Readings from Last 6 Encounters:   01/08/24 165 lb (74.8 kg)        Palliative Care Social History:   Marital Status:   Children: Yes  Living Situation Prior to Admit: Lives home alone   Is Patient Confused: at times     Substance History:   reports that she has never smoked. She has never used smokeless tobacco.  reports that she does not currently use alcohol.  reports no history of drug use.    Spiritual Assessment:   [employer]ehovah's witness - [place] Not Listed  Unable to address during encounter     Past Medical History/Past Surgical History:   This is the 1st hospitalization in the past 12 months.    Medical History: obtained from [facility]  Past Medical History:   Diagnosis Date    Anemia     Migraines      History reviewed. No pertinent surgical history.    Family History: obtained from [facility]  Family History   Problem Relation Age of Onset    Breast Cancer Mother 60 - 69       Allergies:  Allergies   Allergen Reactions    Mold TINITUS       Medications:     Current Facility-Administered Medications:     potassium chloride 40 mEq in 250mL sodium chloride 0.9% IVPB premix, 40 mEq, Intravenous, Once    guaiFENesin ER (Mucinex) 12 hr tab 600 mg, 600 mg, Oral, BID    benzonatate (Tessalon) cap 100 mg, 100 mg, Oral, TID PRN    LORazepam (Ativan) tab 1 mg, 1 mg, Oral, Q6H PRN    multivitamin (Centrum)  chewable tab (Adult) 1 tablet, 1 tablet, Oral, Daily    ipratropium-albuterol (Duoneb) 0.5-2.5 (3) MG/3ML inhalation solution 3 mL, 3 mL, Nebulization, Q6H PRN    HYDROmorphone (Dilaudid) 1 MG/ML injection 0.4 mg, 0.4 mg, Intravenous, Q2H PRN **OR** HYDROmorphone (Dilaudid) 1 MG/ML injection 0.8 mg, 0.8 mg, Intravenous, Q2H PRN **OR** HYDROmorphone (Dilaudid) 1 MG/ML injection 1.2 mg, 1.2 mg, Intravenous, Q2H PRN    HYDROcodone-acetaminophen (Norco) 5-325 MG per tab 1 tablet, 1 tablet, Oral, Q4H PRN **OR** HYDROcodone-acetaminophen (Norco) 5-325 MG per tab 2 tablet, 2 tablet, Oral, Q4H PRN    dextromethorphan-guaiFENesin (Robitussin-DM)  mg/5mL oral solution 5 mL, 5 mL, Oral, Q6H PRN    acetaminophen (Tylenol Extra Strength) tab 500 mg, 500 mg, Oral, Q4H PRN    ondansetron (Zofran) 4 MG/2ML injection 4 mg, 4 mg, Intravenous, Q6H PRN    prochlorperazine (Compazine) 10 MG/2ML injection 5 mg, 5 mg, Intravenous, Q8H PRN    butalbital-acetaminophen-caffeine (Fioricet) -40 MG per tab 1 tablet, 1 tablet, Oral, Q4H PRN    Functional Status History:  ADLs: bathing or showering, dressing, getting in and out of bed or a chair, walking, using the toilet, and eating  - LOW  1 - 3 performance deficits   IADLs: use the phone, shop for groceries, meal preparation, manage medicines, clean living area, use transportation by self, manage money  - LOW  1 - 3 performance deficits   DME: None  Recurrent falls: No    Palliative Performance Scale:   Prior to admission Palliative performance scale PPSv2 (%): 90 (pt/family reported)   Current Palliative performance scale PPSv2 (%): 60   % Ambulation Activity Level Self-Care Intake Consciousness   100 Full  Normal  No Disease Full Normal Full   90 Full  Normal  Some Disease Full Normal Full   80 Full  Normal w/effort  Some Disease Full Normal or reduced Full   70 Reduced  Can't Perform Job  Some Disease Full Normal or reduced Full   60 Reduced  Can't Perform Hobby   Significant  Disease Occ Assist Normal or reduced Full or confused   50 Mainly sit/lie Can't do any work  Extensive Disease Partial Assist Normal or reduced Full or confused   40 Mainly in bed Can't do any work  Extensive Disease Mainly Assist Normal or reduced Full or confused   30 Bed Bound Can't do any work  Extensive Disease Max Assist  Total Care Reduced  Drowsy/confused   20 Bed Bound Can't do any work  Extensive Disease Max Assist  Total Care Minimal  Drowsy/confused   10 Bed Bound Can't do any work  Extensive Disease Max Assist  Total Care Mouth Care  Drowsy/confused   0 Death        Objective      Vital Signs:  Blood pressure 107/62, pulse 116, temperature 98.6 °F (37 °C), temperature source Oral, resp. rate 20, height 5' 2\" (1.575 m), weight 165 lb (74.8 kg), SpO2 90%.  Body mass index is 30.18 kg/m².  Present Level of pain: 7/10  Non-verbal signs of pain present: Yes    Physical Exam:  General: Lethargic. In mild respiratory distress. Body habitus  overweight    HEENT: PERR, visual fields intact. No gross deficits. No scleral icterus.   Lungs: Increased effort on NC  Abdomen: pt only allowed for light palpation. Soft, non-tender.  Back: no TTP to chest wall or shoulders   Extremities:  trace to 1+  LE edema present  Neurologic: Aox3 but forgetful and lethargic. Strength intact in hands and feet. No UE drift noted.   Psychiatric: anxious/depressed at times.   Skin: Warm and dry.    Hematology:  Lab Results   Component Value Date    WBC 38.7 (H) 01/11/2024    HGB 7.5 (L) 01/11/2024    HCT 23.8 (L) 01/11/2024    .0 01/11/2024       Coags:  Lab Results   Component Value Date    INR 1.07 01/09/2024    PTT 28.4 01/09/2024       Chemistry:  Lab Results   Component Value Date    CREATSERUM 0.57 01/11/2024    BUN 14 01/11/2024     (L) 01/11/2024    K 3.6 01/11/2024    CL 99 01/11/2024    CO2 21.0 01/11/2024     (H) 01/11/2024    CA 9.3 01/11/2024    ALB 3.1 (L) 01/11/2024    ALKPHO 211 (H) 01/11/2024     BILT 0.9 01/11/2024    TP 7.0 01/11/2024    AST 96 (H) 01/11/2024    ALT 39 01/11/2024       Imaging:  CT CHEST PE AORTA (IV ONLY) (CPT=71260)    Result Date: 1/10/2024  CONCLUSION:   1. Negative for pulmonary embolism.  2. 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  Larger areas of consolidation involving the lower lobes and lingula may reflect additional sites of metastatic disease versus superimposed pneumonia.  The right lower lobe consolidation is increased in size compared to 01/08/2024.  3. Findings of extensive malignant lymphadenopathy of the thorax.  4. Widespread hepatic metastatic disease.    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   LOCATION:  [facility]    Dictated by (CST): Bre Silva DO on 1/09/2024 at 10:16 PM     Finalized by (CST): Bre Silva DO on 1/09/2024 at 10:18 PM       US BIOPSY LIVER (CPT=76942/01827)    Result Date: 1/9/2024  CONCLUSION:  Technically successful ultrasound-guided core biopsy of a representative right hepatic lobe mass.   LOCATION:  [facility]     Dictated by (CST): Ranulfo Doshi MD on 1/09/2024 at 1:03 PM     Finalized by (CST): Ranulfo Doshi MD on 1/09/2024 at 1:04 PM        Summary of Discussion      I discussed reason for palliative care consultation with patient.    Attempted to focus on symptom management per pt' request.     Prognostic awareness/understanding: Remains in the information gathering phase   Pt still uncertain regarding all of the findings since admission    Advance Care Planning counseling and discussion:   HC POA Documentation Not completed.     POLST FORM Not completed  No Order - not  discussed     Assessment and Recommendations        Principal Problem:    Metastatic malignant neoplasm, unspecified site (HCC)  Active Problems:    Hypokalemia    Hyponatremia    Leukocytosis, unspecified type    Anemia, unspecified type   Palliative Care encounter    Goals of care: ongoing   Pt awaiting further work up and family to come in from [facility] today   Code Status: No Order    Symptoms  #cancer-related pain  -agree with IVP dilaudid panel for now  -may adjust plan after colonoscopy   -given pain and lethargy recommended 0.8mg for next dilaudid dose, RN aware     Discussed today's visit with RN.    Palliative Care Follow Up: Palliative care team will Continue to follow while inpatient.  Palliative care follow up outpatient: TBD.    Thank you for allowing Palliative Care services to participate in the care of Alysia Campos.    A total of 40 minutes were spent on this consult, which included all of the following: chart review, direct face to face contact, history taking, physical examination, counseling and coordinating care, and documentation.     Jessie Ngo MD  1/11/2024  12:22 PM  Palliative Care Services    The 21st Century Cures Act makes medical notes like these available to patients in the interest of transparency. Please be advised this is a medical document. Medical documents are intended to carry relevant information, facts as evident, and the clinical opinion of the practitioner. The medical note is intended as peer to peer communication and may appear blunt or direct. It is written in medical language and may contain abbreviations or verbiage that are unfamiliar.      Addendum: attempted a revisit around 1:05 PM. Pt was on commode and preparing for transport to [facility]. Discussed with RN. Son present but in room with pt.   Jessie Ngo MD, 01/11/24, 1:46 PM    Addendum: returned to pt's room around 5:05 PM. Pt last received IVP dilaudid at 1655 per MAR. Pt's son Chano present. Pt has had SOB for over  6 months along with abdominal pain. She used herbal teas for her cough and ibuprofen and hot packs for pain relief. She did not seek medical care for abdominal pains as these were intermittent and thought to be due to menopause. Pain is 6/10 after dilaudid. By 15-20 minutes post dilaudid, pt very lethargic and having trouble keeping her head up. She was able to contribute to the conversation, but was just lethargic. She elected POA as her son. Discussed POA document; they wish to fill out privately. Pt very concerned about addiction to opioids d/t news and movies of recent. Discussed risks and benefits given her situation. Went back and forth about pain, sedation, and IVP vs PCA in uncontrolled pain. Pt naive to opioids and should not start on long-acting until dose finding completed. Pt's daughter Carmen is flying up from [place] tonight; she was a paramedic. Pt's 15 yo daughter is unaware of current medical condition, but does visit at times.   Briefly discussed role of POA with Chano outside of the room. He expressed that he and his mom have not discussed health care needs or EOL planning. He wants to respect his mom's wishes, but the conversation has not yet happened; encouraged discussion when appropriate.   Started PCA after pt's pain escalated after dry heaving. RN was able to get PCA quickly. Given sedation, will start PCA at 0mg continuous, 0.2mg Q10 minute lockout, and 0.4mg bolus PRN. Discontinued Ativan given excessive sedation earlier today. Scheduled senna BID and tylenol 650mg Q8H. Steroids also an option, but will discuss with oncology tomorrow.   An additional 90 minutes spent with pt, family, coordination of care, and documentation.   Total time 130 minutes >16 minutes spent on ACP.   Jessie Ngo MD, 01/11/24, 6:35 PM

## 2024-01-11 NOTE — PROGRESS NOTES
[facility]  Follow up note    Alysia Campos Patient Status:  Inpatient    7/15/1970 MRN [id number]   Location [facility] 4NW-A Attending Harshal Aponte MD   Hosp Day # 3 PCP Renetta Santos DO     Interval History--  Events reviewed from yesterday.  She received one further unit of PRBCs.  Liver biopsy done per IR around noon on .  Prelim pathology reviewed with Dr. Trivedi twice yesterday--malignancy confirmed, probable carcinoma based on staining thus far.  Not clearly c/w ovarian cancer.  Is being sent to [facility] for review.  IHC for germ cell tumor negative.    Situation has been reviewed with Dr. Morton again and he plans to consider EGD/colonoscopy during admission given CEA.      2023  Reason for Consultation:  Likely new GYN malignancy    History of Present Illness:  Alysia Campos is a a(n) 53 year old female that presented to [facility] ED one day ago with cough, fatigue, CAREY for one week pta, and n/v/d x 2 days.    Upon evaluation she was found to be afebrile and hemodynamically stable.  Labs showed WBC 31, hemoglobin 7.    Hx of menorrhagia  x one year with anemia    CT scan a/p showed innumerable metastatic lesions in liver and bilateral lung bases.  Omental based mass in RLQ and L ovarian mass measuring around 8 cm    Note history of occl LLQ pain, abdominal distention, 10 lbs unintentional weight loss.    GynOnc was consulted and IR guided biopsy is planned.  CT chest shows innumerable pulmonary masses and large confluent masses at lung bases bilaterally.  Mediastinal and hilar adenopathy noted.    --61  CA 19-9--150.3          History:  Past Medical History:   Diagnosis Date    Anemia     Migraines      History reviewed. No pertinent surgical history.  Family History   Problem Relation Age of Onset    Breast Cancer Mother 60 - 69      reports that she has never smoked. She has never used smokeless tobacco. She reports that she does not currently use alcohol. She reports that she  does not use drugs.    Allergies:  Allergies   Allergen Reactions    Mold TINITUS       Medications:    Current Facility-Administered Medications:     guaiFENesin ER (Mucinex) 12 hr tab 600 mg, 600 mg, Oral, BID    benzonatate (Tessalon) cap 100 mg, 100 mg, Oral, TID PRN    LORazepam (Ativan) tab 1 mg, 1 mg, Oral, Q6H PRN    polyethylene glycol-electrolyte (Golytely) 236 g oral solution 2,000 mL, 2,000 mL, Oral, Once    multivitamin (Centrum) chewable tab (Adult) 1 tablet, 1 tablet, Oral, Daily    ipratropium-albuterol (Duoneb) 0.5-2.5 (3) MG/3ML inhalation solution 3 mL, 3 mL, Nebulization, Q6H PRN    iron sucrose (Venofer) 20 mg/mL injection 200 mg, 200 mg, Intravenous, Daily    HYDROmorphone (Dilaudid) 1 MG/ML injection 0.4 mg, 0.4 mg, Intravenous, Q2H PRN **OR** HYDROmorphone (Dilaudid) 1 MG/ML injection 0.8 mg, 0.8 mg, Intravenous, Q2H PRN **OR** HYDROmorphone (Dilaudid) 1 MG/ML injection 1.2 mg, 1.2 mg, Intravenous, Q2H PRN    HYDROcodone-acetaminophen (Norco) 5-325 MG per tab 1 tablet, 1 tablet, Oral, Q4H PRN **OR** HYDROcodone-acetaminophen (Norco) 5-325 MG per tab 2 tablet, 2 tablet, Oral, Q4H PRN    dextromethorphan-guaiFENesin (Robitussin-DM)  mg/5mL oral solution 5 mL, 5 mL, Oral, Q6H PRN    acetaminophen (Tylenol Extra Strength) tab 500 mg, 500 mg, Oral, Q4H PRN    ondansetron (Zofran) 4 MG/2ML injection 4 mg, 4 mg, Intravenous, Q6H PRN    prochlorperazine (Compazine) 10 MG/2ML injection 5 mg, 5 mg, Intravenous, Q8H PRN    butalbital-acetaminophen-caffeine (Fioricet) -40 MG per tab 1 tablet, 1 tablet, Oral, Q4H PRN    Review of Systems:  A 10-point review of systems was done with pertinent positives and negatives per the HPI.    Physical Exam:   Blood pressure 107/62, pulse 116, temperature 98.6 °F (37 °C), temperature source Oral, resp. rate 20, height 5' 2\" (1.575 m), weight 165 lb (74.8 kg), SpO2 90%.   General: Patient is alert and oriented x 3, not in acute distress.   Somnolent   Neck:  No JVD. No palpable lymphadenopathy. Neck is supple.   Chest: Clear to auscultation.   Heart: Regular rate and rhythm.    Abdomen: distended.   Extremities: Pedal pulses are present. No edema.   Neurological: Grossly intact.    Lymphatics: There is no palpable lymphadenopathy throughout in the cervical,            supraclavicular, axillary, or inguinal regions.     Laboratory Data:    Lab Results   Component Value Date    WBC 35.3 01/10/2024    HGB 7.1 01/10/2024    HCT 22.1 01/10/2024    .0 01/10/2024    CREATSERUM 0.65 01/11/2024    BUN 13 01/11/2024     01/11/2024    K 3.4 01/11/2024    CL 98 01/11/2024    CO2 24.0 01/11/2024     01/11/2024    CA 9.0 01/11/2024       Imaging:  CT CHEST PE AORTA (IV ONLY) (CPT=71260)    Result Date: 1/10/2024  CONCLUSION:   1. Negative for pulmonary embolism.  2. 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  Larger areas of consolidation involving the lower lobes and lingula may reflect additional sites of metastatic disease versus superimposed pneumonia.  The right lower lobe consolidation is increased in size compared to 01/08/2024.  3. Findings of extensive malignant lymphadenopathy of the thorax.  4. Widespread hepatic metastatic disease.    LOCATION:  [facility]   Dictated by (CST): Kati Davison MD on 1/10/2024 at 2:35 PM     Finalized by (CST): Kati Davison MD on 1/10/2024 at 2:46 PM       US PELVIS W DOPPLER (NHI=[id number])    Result Date: 1/9/2024  CONCLUSION:  Large left ovarian mass as described on the recent CT scan measuring up to 8.1 cm in maximal dimension, highly concerning for a left ovarian neoplasm.   LOCATION:  [facility]    Dictated by (CST): Bre Silva DO on 1/09/2024 at 10:16 PM     Finalized by (CST): Bre Silva DO on 1/09/2024 at 10:18 PM       US BIOPSY LIVER (CPT=76942/30669)    Result Date: 1/9/2024  CONCLUSION:  Technically successful ultrasound-guided core biopsy of a representative right  hepatic lobe mass.   LOCATION:  [facility]     Dictated by (CST): Ranulfo Doshi MD on 1/09/2024 at 1:03 PM     Finalized by (CST): Ranulfo Doshi MD on 1/09/2024 at 1:04 PM       CT CHEST (DWJ=[id number])    Result Date: 1/8/2024  CONCLUSION:  Innumerable pulmonary masses are noted with large confluent masses at lung bases bilaterally.  There is mediastinal and hilar lymphadenopathy.  Findings are consistent with a diffusely metastatic disease.  LOCATION:     Dictated by (CST): Melvin Adan MD on 1/08/2024 at 7:53 PM     Finalized by (CST): Melvin Adan MD on 1/08/2024 at 7:58 PM       CT ABDOMEN PELVIS IV CONTRAST, NO ORAL (ER)    Result Date: 1/8/2024  CONCLUSION:  1. Findings concerning for metastatic disease.  There are innumerable nodules within the visualized lungs as well as innumerable metastatic lesions throughout the liver.  Additionally, there is an omental base mass within the right lower quadrant and a large mass which appears to be emanating from the left ovary measuring 7.8 cm. 2. There is no discrete evidence of an acute intra-abdominal or pelvic process.   LOCATION:  [id number]   Dictated by (CST): Bre Silva DO on 1/08/2024 at 12:17 PM     Finalized by (CST): Bre Silva DO on 1/08/2024 at 12:25 PM       XR CHEST PA + LAT CHEST (CPT=71046)    Result Date: 1/8/2024  CONCLUSION:  1. Suggestion of multiple bilateral pulmonary nodules.  Recommend CT of the chest for further evaluation.   LOCATION:  [facility]   Dictated by (CST): Deyanira Whipple MD on 1/08/2024 at 10:27 AM     Finalized by (CST): Deyanira Whipple MD on 1/08/2024 at 10:29 AM         Impression and Plan:    Patient Active Problem List   Diagnosis    Hypokalemia    Metastatic malignant neoplasm, unspecified site (HCC)    Hyponatremia    Leukocytosis, unspecified type    Anemia, unspecified type       Impression  Metastatic malignancy based on imaging, unclear primary lesion.   Widely metastatic with lymphangitic spread in lungs, liver metastases, pelvic  metastases, ovarian mass.  Liver biopsy was done 1/9 per IR.  Prelim pathology is malignant, but not much further clarification is yet available.   Probably carcinoma based on IHC.  Being sent to [facility]  Anemia, likely multifactorial with components of ESSIE, underlying disease.   She needed one unit PRBCs yesterday.  Hemoglobin improved to 7.1 post PRBCS,  CBC pending this morning.  Tachycardia, SOB.   CTA negative for PE    Plan  Await tissue diagnosis.  We noted results may be not be available until early next week.  Has been sent to [facility].   Check AFP, bHCG after discussion with Dr. Trivedi yesterday.  2.    Venofer--3rd dose today.  Transfuse PRBCs as needed.     3.    Await CBC today  4.    I will review the case with Gyn Onc today given her pain and overall clinical status.  5.    Given persistent anemia and CEA findings during admission, Dr. Morton planning endoscopic evaluation as in patient.   6.    Extensive review of case with Dr. Trivedi x 2 yesterday.   Thus far pathology shows malignancy and most likely a carcinoma.   Further characterization pending IHC pattern and may not be feasible.    Case to be sent to [facility] and likely NGS for molecular testing.   Not clearly consistent with ovarian cancer based on data set thus far.   7.    Palliative care consult for aid in pain control reccs.      Thank you for allowing me to participate in the care of your patient.    Damir Mckeon MD

## 2024-01-11 NOTE — H&P
History & Physical Examination    Patient Name: Alysia Campos  MRN: [id number]  [facility]: [id number]  YOB: 1970    Diagnosis: Hyponatremia [E87.1]  Metastatic malignant neoplasm, unspecified site (HCC) [C79.9]  Anemia, unspecified type [D64.9]  Leukocytosis, unspecified type [D72.829]      Present Illness:  Alysia Campos is a 53 year old female is here Hyponatremia [E87.1]  Metastatic malignant neoplasm, unspecified site (HCC) [C79.9]  Anemia, unspecified type [D64.9]  Leukocytosis, unspecified type [D72.829].    Body mass index is 30.18 kg/m².    Past Medical History:   Diagnosis Date    Anemia     Migraines        Procedure: EGD colonoscopy    Physician Pre-Sedation Assessment    Pre-Sedation Assessment:    Sedation History: Airway Assessed    ASA Classification: 2. Patient with mild systemic disease    Cardiac:    Respiratory:    Abdomen:      Plan: MAC        Current Facility-Administered Medications   Medication Dose Route Frequency    guaiFENesin ER (Mucinex) 12 hr tab 600 mg  600 mg Oral BID    benzonatate (Tessalon) cap 100 mg  100 mg Oral TID PRN    LORazepam (Ativan) tab 1 mg  1 mg Oral Q6H PRN    multivitamin (Centrum) chewable tab (Adult) 1 tablet  1 tablet Oral Daily    ipratropium-albuterol (Duoneb) 0.5-2.5 (3) MG/3ML inhalation solution 3 mL  3 mL Nebulization Q6H PRN    HYDROmorphone (Dilaudid) 1 MG/ML injection 0.4 mg  0.4 mg Intravenous Q2H PRN    Or    HYDROmorphone (Dilaudid) 1 MG/ML injection 0.8 mg  0.8 mg Intravenous Q2H PRN    Or    HYDROmorphone (Dilaudid) 1 MG/ML injection 1.2 mg  1.2 mg Intravenous Q2H PRN    HYDROcodone-acetaminophen (Norco) 5-325 MG per tab 1 tablet  1 tablet Oral Q4H PRN    Or    HYDROcodone-acetaminophen (Norco) 5-325 MG per tab 2 tablet  2 tablet Oral Q4H PRN    dextromethorphan-guaiFENesin (Robitussin-DM)  mg/5mL oral solution 5 mL  5 mL Oral Q6H PRN    acetaminophen (Tylenol Extra Strength) tab 500 mg  500 mg Oral Q4H PRN    ondansetron (Zofran) 4  MG/2ML injection 4 mg  4 mg Intravenous Q6H PRN    prochlorperazine (Compazine) 10 MG/2ML injection 5 mg  5 mg Intravenous Q8H PRN    butalbital-acetaminophen-caffeine (Fioricet) -40 MG per tab 1 tablet  1 tablet Oral Q4H PRN       Allergies:   Allergies   Allergen Reactions    Mold TINITUS       History reviewed. No pertinent surgical history.  Family History   Problem Relation Age of Onset    Breast Cancer Mother 60 - 69     Social History     Tobacco Use    Smoking status: Never    Smokeless tobacco: Never   Substance Use Topics    Alcohol use: Not Currently       SYSTEM Check if Review is Normal Check if Physical Exam is Normal If not normal, please explain:   HEENT [x ] [x ]    NECK & BACK [x ] [x ]    HEART [x ] [x ]    LUNGS [x ] [x ]    ABDOMEN [x ] [x ]    UROGENITAL [ ] [ ]    EXTREMITIES [ ] [ ]    OTHER        [ x ] I have discussed the risks and benefits and alternatives with the patient/family.  They understand and agree to proceed with plan of care.  [ x ] I have reviewed the History and Physical done within the last 30 days.  Any changes noted above.    Ubaldo Morton MD  1/11/2024  2:23 PM

## 2024-01-12 NOTE — PLAN OF CARE
Patient alert and oriented x4. Upon assuming patient care at 1930, patient appears very anxious. MD pageivy and Atarax ordered, although did not have to give medication as patient was able to work through anxiety with the help of family and staff. VSS, afebrile. Patient c/o pain, using diluadid PCA as needed. Re-educated patient and family on the use of PCA, verbalized understanding. Patient SOB with exertion, 4LO2 per nasal canula. Denies nausea. Patient with strong cough, prn medications per MAR. Ambulatory to the bedside commode with assist. Patient states she feels as though she is retaining urine. She feels like she has to push on her abdomen a certain way to be able to urinate. Bladder scan done, 215ml. MD notified. Power of  paperwork was filled out by family and placed in chart. Safety and fall precautions in place. Call light in reach.    Problem: PAIN - ADULT  Goal: Verbalizes/displays adequate comfort level or patient's stated pain goal  Description: INTERVENTIONS:  - Encourage pt to monitor pain and request assistance  - Assess pain using appropriate pain scale  - Administer analgesics based on type and severity of pain and evaluate response  - Implement non-pharmacological measures as appropriate and evaluate response  - Consider cultural and social influences on pain and pain management  - Manage/alleviate anxiety  - Utilize distraction and/or relaxation techniques  - Monitor for opioid side effects  - Notify MD/LIP if interventions unsuccessful or patient reports new pain  - Anticipate increased pain with activity and pre-medicate as appropriate  Outcome: Progressing     Problem: GASTROINTESTINAL - ADULT  Goal: Minimal or absence of nausea and vomiting  Description: INTERVENTIONS:  - Maintain adequate hydration with IV or PO as ordered and tolerated  - Nasogastric tube to low intermittent suction as ordered  - Evaluate effectiveness of ordered antiemetic medications  - Provide nonpharmacologic  comfort measures as appropriate  - Advance diet as tolerated, if ordered  - Obtain nutritional consult as needed  - Evaluate fluid balance  Outcome: Progressing  Goal: Maintains or returns to baseline bowel function  Description: INTERVENTIONS:  - Assess bowel function  - Maintain adequate hydration with IV or PO as ordered and tolerated  - Evaluate effectiveness of GI medications  - Encourage mobilization and activity  - Obtain nutritional consult as needed  - Establish a toileting routine/schedule  - Consider collaborating with pharmacy to review patient's medication profile  Outcome: Progressing     Problem: HEMATOLOGIC - ADULT  Goal: Maintains hematologic stability  Description: INTERVENTIONS  - Assess for signs and symptoms of bleeding or hemorrhage  - Monitor labs and vital signs for trends  - Administer supportive blood products/factors, fluids and medications as ordered and appropriate  - Administer supportive blood products/factors as ordered and appropriate  Outcome: Progressing

## 2024-01-12 NOTE — PROGRESS NOTES
RN notified me that pt was having SOB and anxiety regarding need to go for MRI. She had dose of haldol without sedation or improvement of symptoms. Pt examined at bedside with normal RR and sp02 in the upper 90s on supplemental oxygen 6L NC. She was able to tolerate lying flat and did not have an increase in SOB. She was agreeable to go down for MRI.   RN then received call that pt still very anxious and refusing MRI. Ordered one time  dose of IV Ativan 0.25mg. This was given down in MRI holding. Pt states that she is now willing to have MRI. Family was at bedside. She was then transported to MRI alert and oriented but calm and cooperative with normal RR. Supplemental oxygen briefly removed and replaced by MRI and pt asking to urinate.     Jessie Ngo MD  [phone]

## 2024-01-12 NOTE — PROGRESS NOTES
[facility]    Progress Note     Alysia Campos Patient Status:  Inpatient    7/15/1970 MRN [id number]   Location [facility] 4NW-A Attending Harshal Aponte MD   Hosp Day # 4 PCP Renetta Santos DO     Follow up for: The primary encounter diagnosis was Metastatic malignant neoplasm, unspecified site (HCC). Diagnoses of Hyponatremia, Leukocytosis, unspecified type, and Anemia, unspecified type were also pertinent to this visit.      Interval History/Subjective:     Placed on dilaudid PCA for pain control   Retaining urine, bladder scan ~217cc   Pathology remains pending     Pain well controlled with dilaudid PCA. Upset no one will let her sleep, wants to be left alone \"For just a few hours.\" Endorses double vision that started following CT 2 days ago that has not subsided, no headache, nausea or other focal deficits.     Vital signs:  Temp:  [97.3 °F (36.3 °C)-98.3 °F (36.8 °C)] 98.3 °F (36.8 °C)  Pulse:  [111-120] 117  Resp:  [16-20] 18  BP: (116-142)/() 142/79  SpO2:  [91 %-95 %] 95 %    Physical Exam:      General: No acute distress. Comfortable, nontoxic Appears pale  HEENT: Normocephalic atraumatic. Moist mucous membranes; Sclera anicteric. EOMI  Neck: Supple, +JVD  Respiratory: Diminished at bilateral bases, otherwise CTA; ;reg resp rate & effort, no wheezes/crackles   Cardiovascular: S1, S2. Tachycardic. No murmurs appreciable   Chest and Back: No tenderness or deformity.  Abdomen:  mildly distended, +tender mobile mass in LLQ; +hepatomegaly; no ascites, no rebound/guarding  Neurologic: Drowsy but arousable to voice; No focal neurological deficits. CNII-XII grossly intact.  Musculoskeletal: Moves all extremities.  Extremities: No edema or cyanosis.  Integument: No rashes or lesions.   Psychiatric: Appropriate mood and affect.       Diagnostic Data:      Labs:  Recent Labs   Lab 24  0954 24  0722 24  1726 01/10/24  0712 01/10/24  1611 24  0704 24  0626   WBC 31.6*  30.8*  --  35.3*  --  38.7* 37.5*   HGB 7.0* 6.1* 7.1* 6.6* 7.1* 7.5* 7.5*   MCV 66.4* 67.6*  --  71.3*  --  70.8* 72.6*   .0 371.0  --  343.0  --  347.0 333.0   BAND 1 2  --  1  --   --   --    INR  --  1.07  --   --   --   --   --        Recent Labs   Lab 01/08/24  0954 01/08/24  1250 01/11/24  0704 01/11/24  1622 01/11/24  2358 01/12/24  0626   *   < > 115* 120* 104* 106*   BUN 12   < > 14 14 14 16   CREATSERUM 0.60   < > 0.57 0.63 0.56 0.63   CA 9.1   < > 9.3 9.1 9.3 9.4   ALB 3.6  --  3.1*  --   --   --    *   < > 130* 130* 129* 130*   K 3.5   < > 3.6 4.0 3.8 3.8  3.8   CL 92*   < > 99 98 99 97*   CO2 23.0   < > 21.0 25.0 25.0 24.0   ALKPHO 294*  --  211*  --   --   --    AST 75*  --  96*  --   --   --    ALT 36  --  39  --   --   --    BILT 0.8  --  0.9  --   --   --    TP 7.7  --  7.0  --   --   --     < > = values in this interval not displayed.       Recent Labs   Lab 01/09/24  0722   PTP 13.9   INR 1.07       No results for input(s): \"TROP\", \"CK\" in the last 168 hours.         Imaging: Imaging data reviewed in Epic.    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   Hyponatremia  Acute on Chronic Anemia      Plan    Diplopia  -Unclear if secondary to sedation given increased pain requirements  -Given evidence of widely metastatic disease will need to check MRI brain to rule out metastases, pending  - As needed Haldol ordered prior to MRI, avoid lorazepam given concern for sedation/respiratory depression    Urinary Retention  -- BS ~217mL overnight,  pt endorses stress incontinence & urinary hesitancy/incomplete voiding  --  consult per Onc     Hypoxia, tachycardia -- stable   - BNP elevated may be reflective of volume overload given her persistent IV fluids and recent transfusions  - CT PE negative for PE but with extensive mediastinal and pulmonary tumor burden as the likely cultprit  -- Cont supplemental O2, wean as tolerated     Ovarian Mass  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   -- CT Abd w/ multiple pulmonary, hepatic metastases, omental mass, 7.8cm left ovarian mass  -- CT Chest performed 1/8 w/ innumerable pulmonary nodules  w/ large confluent masses at bilateral bases  -- Gyn Onc consulted, plan discussed  -Oncology consult, recommendations reviewed, pending results of biopsy  - US pelvis unremarkable   -Status post US guided biopsy of liver 1/9/2024, pathology pending  -Continue Norco, Dilaudid as needed for pain  -1/10: Based on patient's tenuous clinical status, may require first round of chemotherapy as inpatient per oncology, pending initial pathology results    Hyponatremia-improving  Possible SIADH   -- likely hypovolemic in the setting of poor PO intake, nausea/vomiting  -- Na 126 on admission, s/p 1L NS bolus  -- Correcting appropriately today, continue normal saline at 150 cc/h  - Sodium correcting appropriately, change BMP to BID  -- tolvaptan x1, will assess response     Acute on Chronic Anemia   -- recent baseline Hgb 7.7-8.0, 7.0 on admission  -- no e/o active bleeding or hemodynamic instability  -- 1 unit PRBC given 1/9 with minimal response, additional unit ordered today  -- Iron studies consistent with deficiency, Venofer ordered  - CTM, transfuse to maintain goal hemoglobin >7.0  - GI consulted due to persistent anemia, elevated CEA levels, no acute interventions for now    DVT Mechanical Prophylaxis:   SCDs,    DVT Pharmacologic Prophylaxis   Medication   None              Code Status: FULL     Dispo:  inpatient; EVA >2 midnights  Plan of care discussed with patient and/or family at bedside.    LINDSAY Aponte MD  [facility]   [phone]      This note was created using voice recognition technology. It may include inadvertent transcriptional errors. Any such errors should be contextually interpreted and should not be taken to alter the content or the meaning.     Note to Patient: The 21st Century Cures Act makes medical notes like these available to patients in the interest of transparency. However, be advised this is a medical document. It is intended as peer to peer communication. It is written in medical language and may contain abbreviations or verbiage that are unfamiliar. It may appear blunt or direct. Medical documents are intended to carry relevant information, facts as evident, and the clinical opinion of the practitioner and not intended to be the primary source of your information.  Please refer directly to myself or clinical staff for information regarding plan of care.

## 2024-01-12 NOTE — PROGRESS NOTES
[facility]  Follow up note    Alysia Campos Patient Status:  Inpatient    7/15/1970 MRN [id number]   Location [facility] 4NW-A Attending Harshal Aponte MD   Hosp Day # 4 PCP Renetta Santos DO     Interval History--  Events reviewed from yesterday.  She was seen by palliative care.  PCA started and that has helped pain control substantially per night RN.  Was having anxiety yesterday that seems situational.  Colonoscopy and EGD were unrevealing for malignancy or bleeding source.    Liver biopsy done per IR around noon on .  Prelim pathology reviewed with Dr. Trivedi earlier this week--malignancy confirmed, probable carcinoma based on staining thus far.  Not clearly c/w ovarian cancer.  Is being sent to [facility] for review.  IHC for germ cell tumor negative.    AFP/HCG normal.     Per RN, having some issues with urinary retention.       2023  Reason for Consultation:  Likely new GYN malignancy    History of Present Illness:  Alysia Campos is a a(n) 53 year old female that presented to [facility] ED one day ago with cough, fatigue, CAREY for one week pta, and n/v/d x 2 days.    Upon evaluation she was found to be afebrile and hemodynamically stable.  Labs showed WBC 31, hemoglobin 7.    Hx of menorrhagia  x one year with anemia    CT scan a/p showed innumerable metastatic lesions in liver and bilateral lung bases.  Omental based mass in RLQ and L ovarian mass measuring around 8 cm    Note history of occl LLQ pain, abdominal distention, 10 lbs unintentional weight loss.    GynOnc was consulted and IR guided biopsy is planned.  CT chest shows innumerable pulmonary masses and large confluent masses at lung bases bilaterally.  Mediastinal and hilar adenopathy noted.    --61  CA 19-9--150.3          History:  Past Medical History:   Diagnosis Date    Anemia     Migraines      History reviewed. No pertinent surgical history.  Family History   Problem Relation Age of Onset    Breast Cancer Mother 60 - 69       reports that she has never smoked. She has never used smokeless tobacco. She reports that she does not currently use alcohol. She reports that she does not use drugs.    Allergies:  Allergies   Allergen Reactions    Mold TINITUS       Medications:    Current Facility-Administered Medications:     lactated ringers infusion, , Intravenous, Continuous    sennosides (Senokot) tab 8.6 mg, 8.6 mg, Oral, BID    benzonatate (Tessalon) cap 100 mg, 100 mg, Oral, Q8H    sodium chloride 0.9% infusion, , Intravenous, Continuous    HYDROmorphone in sodium chloride 0.9% (Dilaudid) 20 mg/100mL PCA premix, , Intravenous, Continuous    HYDROmorphone 0.4 mg BOLUS FROM PCA, 0.4 mg, Intravenous, Q30 Min PRN    naloxone (Narcan) 0.4 MG/ML injection 0.08 mg, 0.08 mg, Intravenous, Q5 Min PRN    acetaminophen (Tylenol) tab 650 mg, 650 mg, Oral, Q8H    hydrOXYzine (Atarax) tab 25 mg, 25 mg, Oral, TID PRN    guaiFENesin ER (Mucinex) 12 hr tab 600 mg, 600 mg, Oral, BID    multivitamin (Centrum) chewable tab (Adult) 1 tablet, 1 tablet, Oral, Daily    ipratropium-albuterol (Duoneb) 0.5-2.5 (3) MG/3ML inhalation solution 3 mL, 3 mL, Nebulization, Q6H PRN    dextromethorphan-guaiFENesin (Robitussin-DM)  mg/5mL oral solution 5 mL, 5 mL, Oral, Q6H PRN    ondansetron (Zofran) 4 MG/2ML injection 4 mg, 4 mg, Intravenous, Q6H PRN    prochlorperazine (Compazine) 10 MG/2ML injection 5 mg, 5 mg, Intravenous, Q8H PRN    Review of Systems:  A 10-point review of systems was done with pertinent positives and negatives per the HPI.    Physical Exam:   Blood pressure 142/79, pulse 117, temperature 98.3 °F (36.8 °C), temperature source Oral, resp. rate 18, height 5' 2\" (1.575 m), weight 165 lb (74.8 kg), SpO2 95%.   General: Patient is alert and oriented x 3, not in acute distress.   Somnolent   Neck: No JVD. No palpable lymphadenopathy. Neck is supple.   Chest: Clear to auscultation.   Heart: Regular rate and rhythm.    Abdomen: distended.   Extremities:  Pedal pulses are present. No edema.   Neurological: Grossly intact.    Lymphatics: There is no palpable lymphadenopathy throughout in the cervical,            supraclavicular, axillary, or inguinal regions.     Laboratory Data:    Lab Results   Component Value Date    WBC 38.7 01/11/2024    HGB 7.5 01/11/2024    HCT 23.8 01/11/2024    .0 01/11/2024    CREATSERUM 0.56 01/11/2024    BUN 14 01/11/2024     01/11/2024    K 3.8 01/11/2024    CL 99 01/11/2024    CO2 25.0 01/11/2024     01/11/2024    CA 9.3 01/11/2024    ALB 3.1 01/11/2024    ALKPHO 211 01/11/2024    BILT 0.9 01/11/2024    TP 7.0 01/11/2024    AST 96 01/11/2024    ALT 39 01/11/2024       Imaging:  CT CHEST PE AORTA (IV ONLY) (CPT=71260)    Result Date: 1/10/2024  CONCLUSION:   1. Negative for pulmonary embolism.  2. 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  Larger areas of consolidation involving the lower lobes and lingula may reflect additional sites of metastatic disease versus superimposed pneumonia.  The right lower lobe consolidation is increased in size compared to 01/08/2024.  3. Findings of extensive malignant lymphadenopathy of the thorax.  4. Widespread hepatic metastatic disease.    LOCATION:  [facility]   Dictated by (CST): Kati Davison MD on 1/10/2024 at 2:35 PM     Finalized by (CST): Kati Davison MD on 1/10/2024 at 2:46 PM       US PELVIS W DOPPLER (QLR=82129/52916)    Result Date: 1/9/2024  CONCLUSION:  Large left ovarian mass as described on the recent CT scan measuring up to 8.1 cm in maximal dimension, highly concerning for a left ovarian neoplasm.   LOCATION:  [facility]    Dictated by (CST): Bre Silva DO on 1/09/2024 at 10:16 PM     Finalized by (CST): Bre Silva DO on 1/09/2024 at 10:18 PM       US BIOPSY LIVER (CPT=76942/90465)    Result Date: 1/9/2024  CONCLUSION:  Technically successful ultrasound-guided core biopsy of a representative right hepatic lobe mass.    LOCATION:  [facility]     Dictated by (CST): Ranulfo Doshi MD on 1/09/2024 at 1:03 PM     Finalized by (CST): Ranulfo Doshi MD on 1/09/2024 at 1:04 PM       CT CHEST (ZSO=35060)    Result Date: 1/8/2024  CONCLUSION:  Innumerable pulmonary masses are noted with large confluent masses at lung bases bilaterally.  There is mediastinal and hilar lymphadenopathy.  Findings are consistent with a diffusely metastatic disease.  LOCATION:     Dictated by (CST): Melvin Adan MD on 1/08/2024 at 7:53 PM     Finalized by (CST): Melvin Adan MD on 1/08/2024 at 7:58 PM       CT ABDOMEN PELVIS IV CONTRAST, NO ORAL (ER)    Result Date: 1/8/2024  CONCLUSION:  1. Findings concerning for metastatic disease.  There are innumerable nodules within the visualized lungs as well as innumerable metastatic lesions throughout the liver.  Additionally, there is an omental base mass within the right lower quadrant and a large mass which appears to be emanating from the left ovary measuring 7.8 cm. 2. There is no discrete evidence of an acute intra-abdominal or pelvic process.   LOCATION:  [id number]   Dictated by (CST): Bre Silva DO on 1/08/2024 at 12:17 PM     Finalized by (CST): Bre Silva DO on 1/08/2024 at 12:25 PM       XR CHEST PA + LAT CHEST (CPT=71046)    Result Date: 1/8/2024  CONCLUSION:  1. Suggestion of multiple bilateral pulmonary nodules.  Recommend CT of the chest for further evaluation.   LOCATION:  [facility]   Dictated by (CST): Deyanira Whipple MD on 1/08/2024 at 10:27 AM     Finalized by (CST): Deyanira Whipple MD on 1/08/2024 at 10:29 AM         Impression and Plan:    Patient Active Problem List   Diagnosis    Hypokalemia    Metastatic malignant neoplasm, unspecified site (HCC)    Hyponatremia    Leukocytosis, unspecified type    Anemia, unspecified type    Cancer related pain    Palliative care by specialist    Goals of care, counseling/discussion       Impression  Metastatic malignancy based on imaging, unclear primary lesion.   Widely  metastatic with lymphangitic spread in lungs, liver metastases, pelvic metastases, ovarian mass.  Liver biopsy was done 1/9 per IR.  Prelim pathology is malignant, but not much further clarification is yet available.   Probably carcinoma based on IHC.  Being sent to [facility]  Anemia, likely multifactorial with components of ESSEI, underlying disease.   She needed one unit PRBCs yesterday.  Hemoglobin improved to 7.1 post PRBCS.  Hemoglobin 7.5 today.   Venofer x 3 has completed earlier this week.  Tachycardia, SOB.   CTA negative for PE  Pain control much improved on PCA.   Appreciate palliative care consultation.    Plan  Await tissue diagnosis.  We noted results may be not be available until early next week.  Has been sent to [facility].     2.    Venofer--3rd dose completed yesterday.  Transfuse PRBCs as needed.   No further IV iron is planned.   3.    Await CBC today  4.    Endoscopic evaluation was done yesterday and negative for malignancy or bleeding source.  6.    Extensive review of case with Dr. Trivedi.   Thus far pathology shows malignancy and most likely a carcinoma.   Further characterization pending IHC pattern and may not be feasible.    Case to be sent to [facility] and likely NGS for molecular testing.   Not clearly consistent with ovarian cancer based on data set thus far.   7.    Palliative care consult for aid in pain control reccs was completed yesterday and greatly appreciated.  PCA to continue  8.    I would consider Urology evaluation.   U/A ordered today.      Thank you for allowing me to participate in the care of your patient.    Damir Mckeon MD

## 2024-01-12 NOTE — PROGRESS NOTES
[facility]  Palliative Care Progress Note    Alysia Campos Patient Status:  Inpatient    7/15/1970 MRN [id number]   [facility] 4NW-A Attending Harshal Aponte MD   Hosp Day # 4 PCP Renetta Santos DO     History/Other:       Alysia Campos is a 53 year old female with migraines who was admitted on 2024 for cough, N/V/D. Work up in our hospital revealed CT scan with lung and liver nodules, an omental mass, and a left ovarian 8 cm mass and labs significant for iron deficiency anemia (Hgb 7.0), leukocytosis, elevated AST/Alk phos, hyponatremia (Na 124), and elevated tumor markers //CEA. Liver bx on  with documented prelim results per oncology that is suggestive of malignancy. Anemia s/p 2 units pRBCs and venofer x3 days.      Consult ordered by:: Dr. Mckeon for evaluation of Palliative Care needs and Uncontrolled symptoms.    Allergies:  Allergies   Allergen Reactions    Mold TINITUS     Medications:     Current Facility-Administered Medications:     haloperidol lactate (Haldol) 5 MG/ML injection 2 mg, 2 mg, Intravenous, Once PRN    lactated ringers infusion, , Intravenous, Continuous    sennosides (Senokot) tab 8.6 mg, 8.6 mg, Oral, BID    benzonatate (Tessalon) cap 100 mg, 100 mg, Oral, Q8H    sodium chloride 0.9% infusion, , Intravenous, Continuous    HYDROmorphone in sodium chloride 0.9% (Dilaudid) 20 mg/100mL PCA premix, , Intravenous, Continuous    HYDROmorphone 0.4 mg BOLUS FROM PCA, 0.4 mg, Intravenous, Q30 Min PRN    naloxone (Narcan) 0.4 MG/ML injection 0.08 mg, 0.08 mg, Intravenous, Q5 Min PRN    acetaminophen (Tylenol) tab 650 mg, 650 mg, Oral, Q8H    hydrOXYzine (Atarax) tab 25 mg, 25 mg, Oral, TID PRN    guaiFENesin ER (Mucinex) 12 hr tab 600 mg, 600 mg, Oral, BID    multivitamin (Centrum) chewable tab (Adult) 1 tablet, 1 tablet, Oral, Daily    ipratropium-albuterol (Duoneb) 0.5-2.5 (3) MG/3ML inhalation solution 3 mL, 3 mL, Nebulization, Q6H PRN     dextromethorphan-guaiFENesin (Robitussin-DM)  mg/5mL oral solution 5 mL, 5 mL, Oral, Q6H PRN    ondansetron (Zofran) 4 MG/2ML injection 4 mg, 4 mg, Intravenous, Q6H PRN    prochlorperazine (Compazine) 10 MG/2ML injection 5 mg, 5 mg, Intravenous, Q8H PRN    Subjective      Interval events:  overnight, anxiety, atarax ordered but not given. No PCA boluses given.   Met with Dr. Aponte and RN outside of the room. They state that pt wants to eat breakfast and rest at the moment. Pain controlled on PCA. Plan for MRI today to assess for mets given persistent blurred vision over the past 1-2 days. At 9:20 AM, I did not enter per pt's request.     Returned at 1140 AM. When I entered the room, the patient was awake, alert, lying in bed, and complaining of intense pain . Son and son's MIL  present at bedside.     Symptom assessment: states that she has not been able to sleep and is requesting I defer my visit. \"I only have 15 minutes to sleep before I need to wake up again.\"   Pain assessment:   17.5 hour (interrogated at 1150 AM) OME total: total 7.82mg (no continuous or RN bolus)   8 hour use: 4.02mg   Current pain: severe, located in the LLQ and throughout the lower abdomen, made worse by movement and coughing, alleviated by pain meds and repositioning     See summary of discussion below.     Review of Systems:  Pt did not allow significant time in the room  Bowel Movement         1/11/2024  1837             Stool Count Calculated for I/O: 1            Objective:     Vital Signs:  Blood pressure 115/65, pulse 106, temperature 98.1 °F (36.7 °C), temperature source Oral, resp. rate 18, height 5' 2\" (1.575 m), weight 165 lb (74.8 kg), SpO2 98%.  Body mass index is 30.18 kg/m².    Present Level of pain: severe   Non-verbal signs of pain present: Yes  Current Palliative performance scale PPSv2 (%): 60    Physical Exam:  General: Lethargic. In mild respiratory distress.    HEENT: AT/NC. No gross focal deficits.  Lungs:  Increased effort with cough on NC  Abdomen: pt declined exam   Extremities: LE edema present  Neurologic: Aox3, lethargic. Moments when she is unable to hold her head upright  Psychiatric: Mood anxious, mood labile   Skin: pale         Results:     Hematology:  Lab Results   Component Value Date    WBC 37.5 (H) 01/12/2024    HGB 7.5 (L) 01/12/2024    HCT 24.6 (L) 01/12/2024    .0 01/12/2024     Coags:  Lab Results   Component Value Date    INR 1.07 01/09/2024    PTT 28.4 01/09/2024     Chemistry:  Lab Results   Component Value Date    CREATSERUM 0.63 01/12/2024    BUN 16 01/12/2024     (L) 01/12/2024    K 3.8 01/12/2024    K 3.8 01/12/2024    CL 97 (L) 01/12/2024    CO2 24.0 01/12/2024     (H) 01/12/2024    CA 9.4 01/12/2024    ALB 3.1 (L) 01/11/2024    ALKPHO 211 (H) 01/11/2024    BILT 0.9 01/11/2024    TP 7.0 01/11/2024    AST 96 (H) 01/11/2024    ALT 39 01/11/2024     Imaging:  CT CHEST PE AORTA (IV ONLY) (CPT=71260)    Result Date: 1/10/2024  CONCLUSION:   1. Negative for pulmonary embolism.  2. 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  Larger areas of consolidation involving the lower lobes and lingula may reflect additional sites of metastatic disease versus superimposed pneumonia.  The right lower lobe consolidation is increased in size compared to 01/08/2024.  3. Findings of extensive malignant lymphadenopathy of the thorax.  4. Widespread hepatic metastatic disease.    LOCATION:  [facility]   Dictated by (CST): Kati Davison MD on 1/10/2024 at 2:35 PM     Finalized by (CST): Kati Davison MD on 1/10/2024 at 2:46 PM           Summary of Discussion     Spoke with Chano and his MIL outside of the room. He expressed concerns that mom is not herself and has been declining significantly since Monday. He reports that he tried to talk to his mom yesterday evening after POA completed as she selected Quality of life over prolongation of life in POA form. Pt  became flustered and conversation shifted.     Advance Care Planning counseling and discussion:   HC POA Documentation Completed--Document signed and will be scanned.   Son Chano.   We discussed the risks vs benefits of life sustaining treatments in the setting of comorbid medical conditions with son.  POLST FORM Does not wish to complete at this time  No Order - pt and family understand this would default to full code. Pt not willing to discuss further today     Assessment and Recommendations       Principal Problem:    Metastatic malignant neoplasm, unspecified site (HCC)  Active Problems:    Hypokalemia    Hyponatremia    Leukocytosis, unspecified type    Anemia, unspecified type    Cancer related pain    Palliative care by specialist    Goals of care, counseling/discussion    Goals of care:  ongoing as pt remains in the information gathering stage    Awaiting further information   Plan for MRI today, pt keeps pushing it off   Code Status: No Order   POA is son Chano. Document to be scanned.     Symptoms  #cancer related pain  -started on low dose PCA yesterday evening. Settings:   - continuous 0mg --> will start 0.2mg    -pt admin 0.2mg Q10 minute lock out with 1mg max per hour    -clinician bolus 0.4mg Q30 minutes over 4 hours for severe pain     Discussed today's visit with RN and Hospitalist.    Palliative Care Follow Up: Palliative care team will Continue to follow while inpatient.  Palliative care follow up outpatient: is indicated but not currently enrolled in palliative care program.  The Palliative Care Service does not round on the weekends but providers are available by [employer], if needed.     Thank you for allowing Palliative Care services to participate in the care of Alysia HAWK Campos.    A total of 50 minutes were spent on this consult, which included all of the following: chart review, direct face to face contact, history taking, physical examination, counseling and coordinating care, and  documentation.     Jessie Ngo MD  1/12/2024  12:30 PM  Palliative Care Services    The 21st Century Cures Act makes medical notes like these available to patients in the interest of transparency. Please be advised this is a medical document. Medical documents are intended to carry relevant information, facts as evident, and the clinical opinion of the practitioner. The medical note is intended as peer to peer communication and may appear blunt or direct. It is written in medical language and may contain abbreviations or verbiage that are unfamiliar.

## 2024-01-12 NOTE — PLAN OF CARE
Patient is A/O x4. VSS. Afebrile. 4-5 L O2 per NC. Pain managed per continuous PCA, see chart. Patient complained of diplopia; MD notified. MRI ordered. Premedicated for MRI, see notes. Patient was unable to complete MRI due to increased anxiety. Patient ambulates with assist to the commode. Voids. Regular diet; tolerated well, poor appetite. All medications given per MAR. IVF infusing per orders. Safety precautions in place. Call light within reach. Family at bedside.  Problem: PAIN - ADULT  Goal: Verbalizes/displays adequate comfort level or patient's stated pain goal  Description: INTERVENTIONS:  - Encourage pt to monitor pain and request assistance  - Assess pain using appropriate pain scale  - Administer analgesics based on type and severity of pain and evaluate response  - Implement non-pharmacological measures as appropriate and evaluate response  - Consider cultural and social influences on pain and pain management  - Manage/alleviate anxiety  - Utilize distraction and/or relaxation techniques  - Monitor for opioid side effects  - Notify MD/LIP if interventions unsuccessful or patient reports new pain  - Anticipate increased pain with activity and pre-medicate as appropriate  Outcome: Progressing     Problem: GASTROINTESTINAL - ADULT  Goal: Minimal or absence of nausea and vomiting  Description: INTERVENTIONS:  - Maintain adequate hydration with IV or PO as ordered and tolerated  - Nasogastric tube to low intermittent suction as ordered  - Evaluate effectiveness of ordered antiemetic medications  - Provide nonpharmacologic comfort measures as appropriate  - Advance diet as tolerated, if ordered  - Obtain nutritional consult as needed  - Evaluate fluid balance  Outcome: Progressing  Goal: Maintains or returns to baseline bowel function  Description: INTERVENTIONS:  - Assess bowel function  - Maintain adequate hydration with IV or PO as ordered and tolerated  - Evaluate effectiveness of GI medications  -  Encourage mobilization and activity  - Obtain nutritional consult as needed  - Establish a toileting routine/schedule  - Consider collaborating with pharmacy to review patient's medication profile  Outcome: Progressing     Problem: HEMATOLOGIC - ADULT  Goal: Maintains hematologic stability  Description: INTERVENTIONS  - Assess for signs and symptoms of bleeding or hemorrhage  - Monitor labs and vital signs for trends  - Administer supportive blood products/factors, fluids and medications as ordered and appropriate  - Administer supportive blood products/factors as ordered and appropriate  Outcome: Progressing

## 2024-01-13 NOTE — PROGRESS NOTES
[facility]    Progress Note     Alysia Campos Patient Status:  Inpatient    7/15/1970 MRN [id number]   Location [facility] 4NW-A Attending Harshal Aponte MD   Hosp Day # 5 PCP Renetta Santos DO     Follow up for: The primary encounter diagnosis was Metastatic malignant neoplasm, unspecified site (HCC). Diagnoses of Hyponatremia, Leukocytosis, unspecified type, and Anemia, unspecified type were also pertinent to this visit.      Interval History/Subjective:     Unable to tolerate MRI   DEBI overnight  Afebrile, HDS  Labs stable    Abdominal pain worsening, only mild relief from prn PCA dose. Abdomen more distended, no nausea/vomiting. Waiting for pathology results is triggering her anxiety.     Vital signs:  Temp:  [98 °F (36.7 °C)-98.5 °F (36.9 °C)] 98 °F (36.7 °C)  Pulse:  [115-124] 115  Resp:  [16-20] 18  BP: (148-159)/(67-84) 149/80  SpO2:  [6 %-97 %] 96 %    Physical Exam:      General: No acute distress. Comfortable, nontoxic Appears pale  HEENT: Normocephalic atraumatic. Moist mucous membranes; Sclera anicteric. EOMI  Neck: Supple, +JVD  Respiratory: Diminished at bilateral bases, otherwise CTA; ;reg resp rate & effort, no wheezes/crackles   Cardiovascular: S1, S2. Tachycardic. No murmurs appreciable   Chest and Back: No tenderness or deformity.  Abdomen: distended with generalized TTP; no guarding/rebound tenderness; +hepatomegaly  Neurologic: Drowsy but arousable to voice; No focal neurological deficits. CNII-XII grossly intact.  Musculoskeletal: Moves all extremities.  Extremities: No edema or cyanosis.  Integument: No rashes or lesions.   Psychiatric: Appropriate mood and affect.       Diagnostic Data:      Labs:  Recent Labs   Lab 24  0954 24  0722 24  1726 01/10/24  0712 01/10/24  1611 24  0704 24  0626 24  0624   WBC 31.6* 30.8*  --  35.3*  --  38.7* 37.5* 37.0*   HGB 7.0* 6.1*   < > 6.6* 7.1* 7.5* 7.5* 7.9*   MCV 66.4* 67.6*  --  71.3*  --  70.8* 72.6*  73.7*   .0 371.0  --  343.0  --  347.0 333.0 345.0   BAND 1 2  --  1  --   --  9 1   INR  --  1.07  --   --   --   --   --   --     < > = values in this interval not displayed.       Recent Labs   Lab 01/08/24  0954 01/08/24  1250 01/11/24  0704 01/11/24  1622 01/12/24  0626 01/12/24  1915 01/13/24  0624   *   < > 115*   < > 106* 108* 97   BUN 12   < > 14   < > 16 15 16   CREATSERUM 0.60   < > 0.57   < > 0.63 0.55 0.68   CA 9.1   < > 9.3   < > 9.4 10.0 9.9   ALB 3.6  --  3.1*  --   --   --   --    *   < > 130*   < > 130* 134* 136   K 3.5   < > 3.6   < > 3.8  3.8 4.1 3.7   CL 92*   < > 99   < > 97* 101 105   CO2 23.0   < > 21.0   < > 24.0 23.0 26.0   ALKPHO 294*  --  211*  --   --   --   --    AST 75*  --  96*  --   --   --   --    ALT 36  --  39  --   --   --   --    BILT 0.8  --  0.9  --   --   --   --    TP 7.7  --  7.0  --   --   --   --     < > = values in this interval not displayed.       Recent Labs   Lab 01/09/24  0722   PTP 13.9   INR 1.07       No results for input(s): \"TROP\", \"CK\" in the last 168 hours.         Imaging: Imaging data reviewed in Epic.    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   Hyponatremia  Acute on Chronic Anemia      Plan    Diplopia  -Unclear if secondary to sedation given increased pain requirements  -Given evidence of widely metastatic disease will need to check MRI brain to rule out metastases, pending  -- could not tolerate MRI Brain, will need done with anesthesia, tentatively Monday 1/15    Abdominal Pain  / Distention  -- likely secondary to massive hepatomegaly noted on CT   -- check KUB / Abd US     Urinary Retention  -- BS ~217mL overnight, pt endorses stress incontinence & urinary hesitancy/incomplete voiding  --  consult per Onc -> Flomax trial     Hypoxia, tachycardia -- stable   - BNP elevated may be reflective of volume overload given her persistent IV fluids and recent transfusions  - CT PE negative for PE but with extensive mediastinal and pulmonary tumor burden as the likely cultprit  -- Cont supplemental O2, wean as tolerated     Ovarian Mass  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   -- CT Abd w/ multiple pulmonary, hepatic metastases, omental mass, 7.8cm left ovarian mass  -- CT Chest performed 1/8 w/ innumerable pulmonary nodules  w/ large confluent masses at bilateral bases  -- Gyn Onc consulted, plan discussed  -Oncology consult, recommendations reviewed, pending results of biopsy  - US pelvis unremarkable   -Status post US guided biopsy of liver 1/9/2024, pathology pending  -Continue Norco, Dilaudid as needed for pain  -1/10: Based on patient's tenuous clinical status, may require first round of chemotherapy as inpatient per oncology, pending initial pathology results    Hyponatremia-improving  Possible SIADH   -- likely hypovolemic in the setting of poor PO intake, nausea/vomiting  -- Na 126 on admission, s/p 1L NS bolus  -- Correcting appropriately today, continue normal saline at 150 cc/h  - Sodium correcting appropriately, change BMP to BID  -- tolvaptan x1, will assess response     Acute on Chronic Anemia   -- recent baseline Hgb 7.7-8.0, 7.0 on admission  -- no e/o active bleeding or hemodynamic instability  -- 1 unit PRBC given 1/9 with minimal response, additional unit ordered today  -- Iron studies consistent with deficiency, Venofer ordered  - CTM, transfuse to maintain goal hemoglobin >7.0  - GI consulted due to persistent anemia, elevated CEA levels, no acute  interventions for now    DVT Mechanical Prophylaxis:   SCDs,    DVT Pharmacologic Prophylaxis   Medication   None              Code Status: FULL     Dispo: inpatient; EVA >2 midnights    Plan of care discussed with patient and/or family at bedside.    LINDSAY Aponte MD  [facility]   [phone]      This note was created using voice recognition technology. It may include inadvertent transcriptional errors. Any such errors should be contextually interpreted and should not be taken to alter the content or the meaning.     Note to Patient: The 21st Century Cures Act makes medical notes like these available to patients in the interest of transparency. However, be advised this is a medical document. It is intended as peer to peer communication. It is written in medical language and may contain abbreviations or verbiage that are unfamiliar. It may appear blunt or direct. Medical documents are intended to carry relevant information, facts as evident, and the clinical opinion of the practitioner and not intended to be the primary source of your information.  Please refer directly to myself or clinical staff for information regarding plan of care.

## 2024-01-13 NOTE — PLAN OF CARE
Assumed care @ 0730.  Patient drowsy, easily arousable, respirations unlabored, lungs sound diminished, O2sat high 90's on O2 4l/nc, heart rate 100's-110's @ rest. Patient c/o severe pain on lower abdomen. Patient's abdomen, distended, soft, tender, bowel sounds hypoactive. Patient with poor appettite. Patient on Dilaudid PCA, Dilaudid bolus given as needed, with mild relief.

## 2024-01-13 NOTE — CONSULTS
[facility]  Report of Consultation    Alysia Campos Patient Status:  Inpatient    7/15/1970 MRN [id number]   Location [facility] 4NW-A Attending Harshal Aponte MD   Hosp Day # 5 PCP Renetta Santos DO     Reason for Consultation:  Difficulty voiding    History of Present Illness:  Alysia Cmapos is a a(n) 53 year old female newly diagnosed with metastatic cancer. Oncology reviewing with patient  Family present with her   Was complaining of difficulty voiding  On chart review, had straight cath 1/10 for 250ml  UA on initial evaluation was negative  Has not needed any straight cath since.  Has noted when laying down can actually void better on a bed pan compared to when sitting on the toilet  Does not want an indwelling hunter catheter for care if not needed    Had EGD colonoscopy  due eval the iron deficiency anemia without acute bleeding on presentation (no acute findings)  Yesterday was voiding smaller darker amount, some bladder scans ~200, some ~100  Normal creatinine  Reviewed with patient and family there is no evidence for obstructive uropathy    Second UA yesterday now with rbc and wbc but also does not look like a good clean catch  No gross hematuria    History:  Past Medical History:   Diagnosis Date    Anemia     Migraines      Past Surgical History:   Procedure Laterality Date    COLONOSCOPY N/A 2024    Procedure: .;  Surgeon: Ubaldo Morton MD;  Location:  ENDOSCOPY     Family History   Problem Relation Age of Onset    Breast Cancer Mother 60 - 69      reports that she has never smoked. She has never used smokeless tobacco. She reports that she does not currently use alcohol. She reports that she does not use drugs.    Allergies:  Allergies   Allergen Reactions    Mold TINITUS       Medications:    Current Facility-Administered Medications:     HYDROmorphone in sodium chloride 0.9% (Dilaudid) 20 mg/100mL PCA premix, , Intravenous, Continuous    sennosides (Senokot) tab 8.6 mg,  8.6 mg, Oral, BID    benzonatate (Tessalon) cap 100 mg, 100 mg, Oral, Q8H    sodium chloride 0.9% infusion, , Intravenous, Continuous    HYDROmorphone 0.4 mg BOLUS FROM PCA, 0.4 mg, Intravenous, Q30 Min PRN    naloxone (Narcan) 0.4 MG/ML injection 0.08 mg, 0.08 mg, Intravenous, Q5 Min PRN    acetaminophen (Tylenol) tab 650 mg, 650 mg, Oral, Q8H    hydrOXYzine (Atarax) tab 25 mg, 25 mg, Oral, TID PRN    guaiFENesin ER (Mucinex) 12 hr tab 600 mg, 600 mg, Oral, BID    multivitamin (Centrum) chewable tab (Adult) 1 tablet, 1 tablet, Oral, Daily    ipratropium-albuterol (Duoneb) 0.5-2.5 (3) MG/3ML inhalation solution 3 mL, 3 mL, Nebulization, Q6H PRN    dextromethorphan-guaiFENesin (Robitussin-DM)  mg/5mL oral solution 5 mL, 5 mL, Oral, Q6H PRN    ondansetron (Zofran) 4 MG/2ML injection 4 mg, 4 mg, Intravenous, Q6H PRN    prochlorperazine (Compazine) 10 MG/2ML injection 5 mg, 5 mg, Intravenous, Q8H PRN    Review of Systems:  See HPI and hospital notes    Physical Exam:  /80 (BP Location: Left arm)   Pulse 115   Temp 98 °F (36.7 °C) (Oral)   Resp 18   Ht 5' 2\" (1.575 m)   Wt 165 lb (74.8 kg)   LMP  (LMP Unknown)   SpO2 96%   BMI 30.18 kg/m²   Family supporting her to the commode  LUNGS some SOB, cough with moving around and talking  ABD no acute tenderness but pain does lateralize more to the LLQ - asked if pushing on that LLQ helps with any spont voiding.  She does not think so  Is needing dilaudid to help with her pain    Laboratory Data:  Lab Results   Component Value Date    WBC 37.0 01/13/2024    HGB 7.9 01/13/2024    HCT 26.4 01/13/2024    .0 01/13/2024    CREATSERUM 0.68 01/13/2024    BUN 16 01/13/2024     01/13/2024    K 3.7 01/13/2024     01/13/2024    CO2 26.0 01/13/2024    GLU 97 01/13/2024    CA 9.9 01/13/2024       Imaging:    MRI STROKE BRAIN DWI ONLY(NO IV)(CPT=70551)    Result Date: 1/12/2024  CONCLUSION:  Severely limited study with motion artifact on a DWI only study  demonstrating no definite evidence of infarct.   LOCATION:  [facility]   Dictated by (CST): Nomi Dawson MD on 1/12/2024 at 4:56 PM     Finalized by (CST): Nomi Dawson MD on 1/12/2024 at 4:59 PM       CT CHEST PE AORTA (IV ONLY) (CPT=71260)    Result Date: 1/10/2024  CONCLUSION:   1. Negative for pulmonary embolism.  2. 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  Larger areas of consolidation involving the lower lobes and lingula may reflect additional sites of metastatic disease versus superimposed pneumonia.  The right lower lobe consolidation is increased in size compared to 01/08/2024.  3. Findings of extensive malignant lymphadenopathy of the thorax.  4. Widespread hepatic metastatic disease.    LOCATION:  [facility]   Dictated by (CST): Kati Davison MD on 1/10/2024 at 2:35 PM     Finalized by (CST): Kati Davison MD on 1/10/2024 at 2:46 PM       US PELVIS W DOPPLER (DLR=16465/27985)    Result Date: 1/9/2024  CONCLUSION:  Large left ovarian mass as described on the recent CT scan measuring up to 8.1 cm in maximal dimension, highly concerning for a left ovarian neoplasm.   LOCATION:  [facility]    Dictated by (CST): Bre Silva DO on 1/09/2024 at 10:16 PM     Finalized by (CST): Bre Silva DO on 1/09/2024 at 10:18 PM       US BIOPSY LIVER (CPT=76942/05383)    Result Date: 1/9/2024  CONCLUSION:  Technically successful ultrasound-guided core biopsy of a representative right hepatic lobe mass.   LOCATION:  [facility]     Dictated by (CST): Ranulfo Doshi MD on 1/09/2024 at 1:03 PM     Finalized by (CST): Ranulfo Doshi MD on 1/09/2024 at 1:04 PM       CT CHEST (LIB=01364)    Result Date: 1/8/2024  CONCLUSION:  Innumerable pulmonary masses are noted with large confluent masses at lung bases bilaterally.  There is mediastinal and hilar lymphadenopathy.  Findings are consistent with a diffusely metastatic disease.  LOCATION:     Dictated by (CST): Melvin Adan MD on 1/08/2024 at 7:53 PM      Finalized by (CST): Melvin Adan MD on 1/08/2024 at 7:58 PM       CT ABDOMEN PELVIS IV CONTRAST, NO ORAL (ER)    Result Date: 1/8/2024  CONCLUSION:  1. Findings concerning for metastatic disease.  There are innumerable nodules within the visualized lungs as well as innumerable metastatic lesions throughout the liver.  Additionally, there is an omental base mass within the right lower quadrant and a large mass which appears to be emanating from the left ovary measuring 7.8 cm. 2. There is no discrete evidence of an acute intra-abdominal or pelvic process.   LOCATION:  [id number]   Dictated by (CST): Bre Silva DO on 1/08/2024 at 12:17 PM     Finalized by (CST): Bre Silva DO on 1/08/2024 at 12:25 PM       XR CHEST PA + LAT CHEST (CPT=71046)    Result Date: 1/8/2024  CONCLUSION:  1. Suggestion of multiple bilateral pulmonary nodules.  Recommend CT of the chest for further evaluation.   [place]  [facility]   Dictated by (CST): eDyanira Whipple MD on 1/08/2024 at 10:27 AM     Finalized by (CST): Deyanira Whipple MD on 1/08/2024 at 10:29 AM          Impression:  Patient Active Problem List   Diagnosis    Hypokalemia    Metastatic malignant neoplasm, unspecified site (HCC)    Hyponatremia    Leukocytosis, unspecified type    Anemia, unspecified type    Cancer related pain    Palliative care by specialist    Goals of care, counseling/discussion       Metastatic cancer  Urinary hesitancy/Incomplete bladder emptying  Baseline LOKESH    ABD PELV CT reviewed with no evidence of obstructive uropathy, no hydronephrosis, no renal masses  Bladder is extrinsically distorted/compressed by other GYN organs/mass - left ovarian mass pushes uterus to the right and bladder is distorted sl to the left    Second UA has microhematuria and pyuria, suspect without urinary tract infection     Recommendations:  reviewed cystoscopy - I do not believe this will help with her voiding dysfunction   reviewed CIC prn and IFC - she wants to hold off  Offered  trial of Flomax. Reviewed risks of orthostatic hypotension, dizziness, etc.  She would like to try      Thank you for allowing me to participate in the care of your patient.    Saqib Rosen MD  1/13/2024  7:05 AM

## 2024-01-13 NOTE — PLAN OF CARE
Pt received A&Ox4, drowsy. VSS. 5L NC. Afebrile. Pt c/o abd pain, on PCA dilaudid - 0.4 boluses given per MAR. IVF. Call light within reach. Fall precautions in place.     Problem: PAIN - ADULT  Goal: Verbalizes/displays adequate comfort level or patient's stated pain goal  Description: INTERVENTIONS:  - Encourage pt to monitor pain and request assistance  - Assess pain using appropriate pain scale  - Administer analgesics based on type and severity of pain and evaluate response  - Implement non-pharmacological measures as appropriate and evaluate response  - Consider cultural and social influences on pain and pain management  - Manage/alleviate anxiety  - Utilize distraction and/or relaxation techniques  - Monitor for opioid side effects  - Notify MD/LIP if interventions unsuccessful or patient reports new pain  - Anticipate increased pain with activity and pre-medicate as appropriate  Outcome: Progressing     Problem: GASTROINTESTINAL - ADULT  Goal: Minimal or absence of nausea and vomiting  Description: INTERVENTIONS:  - Maintain adequate hydration with IV or PO as ordered and tolerated  - Nasogastric tube to low intermittent suction as ordered  - Evaluate effectiveness of ordered antiemetic medications  - Provide nonpharmacologic comfort measures as appropriate  - Advance diet as tolerated, if ordered  - Obtain nutritional consult as needed  - Evaluate fluid balance  Outcome: Progressing  Goal: Maintains or returns to baseline bowel function  Description: INTERVENTIONS:  - Assess bowel function  - Maintain adequate hydration with IV or PO as ordered and tolerated  - Evaluate effectiveness of GI medications  - Encourage mobilization and activity  - Obtain nutritional consult as needed  - Establish a toileting routine/schedule  - Consider collaborating with pharmacy to review patient's medication profile  Outcome: Progressing     Problem: HEMATOLOGIC - ADULT  Goal: Maintains hematologic stability  Description:  INTERVENTIONS  - Assess for signs and symptoms of bleeding or hemorrhage  - Monitor labs and vital signs for trends  - Administer supportive blood products/factors, fluids and medications as ordered and appropriate  - Administer supportive blood products/factors as ordered and appropriate  Outcome: Progressing

## 2024-01-13 NOTE — PHYSICAL THERAPY NOTE
PHYSICAL THERAPY EVALUATION - INPATIENT     Room Number: 407/407-A  Evaluation Date: 1/13/2024  Type of Evaluation: Initial  Physician Order: PT Eval and Treat    Presenting Problem: metastatic malignant neoplasm  Co-Morbidities : anemia  Reason for Therapy: Mobility Dysfunction and Discharge Planning    History related to current admission: Patient is a 53 year old female admitted on 1/8/2024 from home for reports of having a cough for a week, diarrhea/vomiting.  Pt diagnosed with metastatic malignant neoplasm.      CT abdomen/pelvis 1/8/24:  CONCLUSION:    1. Findings concerning for metastatic disease.  There are innumerable nodules within the visualized lungs as well as innumerable metastatic lesions throughout the liver.  Additionally, there is an omental base mass within the right lower quadrant and a   large mass which appears to be emanating from the left ovary measuring 7.8 cm.   2. There is no discrete evidence of an acute intra-abdominal or pelvic process.     CT chest 1/8/24:  CONCLUSION:  Innumerable pulmonary masses are noted with large confluent masses at lung bases bilaterally.  There is mediastinal and hilar lymphadenopathy.  Findings are consistent with a diffusely metastatic disease.       US pelvis 1/9/24:  CONCLUSION:    Large left ovarian mass as described on the recent CT scan measuring up to 8.1 cm in maximal dimension, highly concerning for a left ovarian neoplasm.     ASSESSMENT   PT orders received, chart reviewed, and RN approved PT session. Vitals assessed and WNL. In this PT evaluation, the patient presents with the following impairments dec endurance, dec overall functional mobility.  These impairments and comorbidities manifest themselves as functional limitations in independent bed mobility, transfers, and gait.  The patient is below baseline and would benefit from skilled inpatient PT to address the above deficits to assist patient in returning to prior to level of function.    Functional outcome measures completed include [facility]  The AM-PAC '6-Clicks' Inpatient Basic Mobility Short Form was completed and this patient is demonstrating a Approx Degree of Impairment: 46.58%  degree of impairment in mobility. Research supports that patients with this level of impairment may benefit from DC recommendation to home with HHPT/OT.    DISCHARGE RECOMMENDATIONS  PT Discharge Recommendations: Home with home health PT/OT    PLAN  PT Treatment Plan: Bed mobility;Body mechanics;Endurance;Energy conservation;Patient education;Family education;Gait training;Stair training;Transfer training;Balance training  Rehab Potential : Good  Frequency (Obs): 3-5x/week  Number of Visits to Meet Established Goals: 5      CURRENT GOALS    Goal #1 Patient is able to demonstrate supine - sit EOB @ level: modified independent     Goal #2 Patient is able to demonstrate transfers Sit to/from Stand at assistance level: modified independent     Goal #3 Patient is able to ambulate 150 feet with assist device: walker - rolling at assistance level: modified independent     Goal #4 Pt to ascend/descend 12 steps with use of 1 rail at Mod I.   Goal #5    Goal #6    Goal Comments: Goals established on 1/13/2024    HOME SITUATION  Type of Home: House   Home Layout: Two level  Stairs to Enter : 2  Railing: Yes  Stairs to Bedroom: 12  Railing: Yes    Lives With: Daughter (15 year old daughter)  Drives: Yes  Patient Owned Equipment: None       Prior Level of [place]: Pt lives in a two level home with 15 year old daughter. Pt has two other children, one is 26 years old and her son is 29 and lives in [place] with spouse. Pt is independent with ADL, IADL, ambulation, driving, and works full time.     SUBJECTIVE  Pt is pleasant and cooperative.       OBJECTIVE     Fall Risk: Standard fall risk    WEIGHT BEARING RESTRICTION                   PAIN ASSESSMENT  Rating: Unable to rate  Location: abdominal region  Management Techniques:  Activity promotion;Relaxation;Repositioning;Breathing techniques    COGNITION  Overall Cognitive Status:  WFL - within functional limits    RANGE OF MOTION AND STRENGTH ASSESSMENT  Upper extremity ROM and strength are within functional limits     Lower extremity ROM is within functional limits     Lower extremity strength is within functional limits       BALANCE  Static Sitting: Fair +  Dynamic Sitting: Fair  Static Standing: Poor +  Dynamic Standing: Poor    ADDITIONAL TESTS                                    ACTIVITY TOLERANCE  Pulse: 115  Heart Rate Source: Monitor                   O2 WALK  Oxygen Therapy  SPO2% on Oxygen at Rest: 96  At rest oxygen flow (liters per minute): 4  SPO2% Ambulation on Oxygen: 92  Ambulation oxygen flow (liters per minute): 6    NEUROLOGICAL FINDINGS                        AM-PAC '6-Clicks' INPATIENT SHORT FORM - BASIC MOBILITY  How much difficulty does the patient currently have...  Patient Difficulty: Turning over in bed (including adjusting bedclothes, sheets and blankets)?: A Little   Patient Difficulty: Sitting down on and standing up from a chair with arms (e.g., wheelchair, bedside commode, etc.): A Little   Patient Difficulty: Moving from lying on back to sitting on the side of the bed?: A Little   How much help from another person does the patient currently need...   Help from Another: Moving to and from a bed to a chair (including a wheelchair)?: A Little   Help from Another: Need to walk in hospital room?: A Little   Help from Another: Climbing 3-5 steps with a railing?: A Little       AM-PAC Score:  Raw Score: 18   Approx Degree of Impairment: 46.58%   Standardized Score (AM-PAC Scale): 43.63   CMS Modifier (G-Code): CK    FUNCTIONAL ABILITY STATUS  Gait Assessment   Functional Mobility/Gait Assessment  Gait Assistance: Supervision  Distance (ft): 150  Assistive Device: Rolling walker  Pattern: Shuffle    Skilled Therapy Provided     Bed Mobility:  Rolling:  supervision  Supine to sit: supervision   Sit to supine: supervision     Transfer Mobility:  Sit to stand: supervision   Stand to sit: supervision  Gait = supervision    Therapist's Comments: pt sitting up at EOB, agreeable to PT. Vitals assessed and WNL. Pt on 4 liters of O2 at rest. Pt instructed in proper body mechanics with bed mobility, log rolling technique, and proper breathing technique. Pt instructed in proper hand placement and integration of RW with sit<>stand and ambulation. Pt cued for activity pacing, and breathing. Pt demonstrates dec speed with all movement and ambulation, pt moving from bed to commode, to standing, to ambulating, to be and to commode, stating that being upright is less painful. Unable to achieved a comfortable position in bed, therefore requested to use commode. Pt with inc difficulty urinating and required inc time to complete. Inc time spent on education and discussion regarding the importance of activity pacing and activity level, proper body mechanics with functional activities, proper breathing technique and overall functional mobility. Advised pt to amb with RN staff 3-4 times per day with RW. Pt in understanding. Pt left sitting EOB with PCT present. RN notified of session.     Exercise/Education Provided:  Bed mobility  Body mechanics  Energy conservation  Functional activity tolerated  Gait training  Transfer training    Patient End of Session: In bed;Needs met;Call light within reach;RN aware of session/findings;All patient questions and concerns addressed;Family present      Patient Evaluation Complexity Level:  History Moderate - 1 or 2 personal factors and/or co-morbidities   Examination of body systems Moderate - addressing a total of 3 or more elements   Clinical Presentation Moderate - Evolving   Clinical Decision Making Moderate - Evolving       PT Session Time: 60 minutes  Gait Training: 15 minutes  Therapeutic Activity: 25 minutes  Neuromuscular Re-education: 0  minutes  Therapeutic Exercise: 0 minutes

## 2024-01-13 NOTE — PROGRESS NOTES
[facility]  Follow up note    Alysia Campos Patient Status:  Inpatient    7/15/1970 MRN [id number]   Location [facility] 4NW-A Attending Harshal Aponte MD   Hosp Day # 5 PCP Renetta Santos,      Interval History--  On PCA for pain control. Palliative care following   Still with pain   Colonoscopy and EGD were unrevealing for malignancy or bleeding source.    Liver biopsy done per IR around noon on .  Prelim pathology--malignancy confirmed, probable carcinoma based on staining thus far.  Not clearly c/w ovarian cancer.  IHC for germ cell tumor negative. Has been sent for consultation     AFP/HCG normal.     Her son, lyle and daughter in law are in room at time of visit       --61  CA 19-9--150.3          History:  Past Medical History:   Diagnosis Date    Anemia     Migraines      Past Surgical History:   Procedure Laterality Date    COLONOSCOPY N/A 2024    Procedure: .;  Surgeon: Ubaldo Morton MD;  Location:  ENDOSCOPY     Family History   Problem Relation Age of Onset    Breast Cancer Mother 60 - 69      reports that she has never smoked. She has never used smokeless tobacco. She reports that she does not currently use alcohol. She reports that she does not use drugs.    Allergies:  Allergies   Allergen Reactions    Mold TINITUS       Medications:    Current Facility-Administered Medications:     HYDROmorphone in sodium chloride 0.9% (Dilaudid) 20 mg/100mL PCA premix, , Intravenous, Continuous    sennosides (Senokot) tab 8.6 mg, 8.6 mg, Oral, BID    benzonatate (Tessalon) cap 100 mg, 100 mg, Oral, Q8H    sodium chloride 0.9% infusion, , Intravenous, Continuous    HYDROmorphone 0.4 mg BOLUS FROM PCA, 0.4 mg, Intravenous, Q30 Min PRN    naloxone (Narcan) 0.4 MG/ML injection 0.08 mg, 0.08 mg, Intravenous, Q5 Min PRN    acetaminophen (Tylenol) tab 650 mg, 650 mg, Oral, Q8H    hydrOXYzine (Atarax) tab 25 mg, 25 mg, Oral, TID PRN    guaiFENesin ER (Mucinex) 12 hr tab 600 mg, 600  mg, Oral, BID    multivitamin (Centrum) chewable tab (Adult) 1 tablet, 1 tablet, Oral, Daily    ipratropium-albuterol (Duoneb) 0.5-2.5 (3) MG/3ML inhalation solution 3 mL, 3 mL, Nebulization, Q6H PRN    dextromethorphan-guaiFENesin (Robitussin-DM)  mg/5mL oral solution 5 mL, 5 mL, Oral, Q6H PRN    ondansetron (Zofran) 4 MG/2ML injection 4 mg, 4 mg, Intravenous, Q6H PRN    prochlorperazine (Compazine) 10 MG/2ML injection 5 mg, 5 mg, Intravenous, Q8H PRN    Review of Systems:  A 10-point review of systems was done with pertinent positives and negatives per the HPI.    Physical Exam:   Blood pressure 149/80, pulse 115, temperature 98 °F (36.7 °C), temperature source Oral, resp. rate 18, height 5' 2\" (1.575 m), weight 165 lb (74.8 kg), SpO2 96%.   General: Patient is alert; appeared uncomfortable, but improved after she pressed for her pain med   Chest: Clear to auscultation. + cough    Heart: Regular rate     Abdomen: distended.   Extremities: + b/l LE edema    Neurological: Grossly intact.          Laboratory Data:    Lab Results   Component Value Date    WBC 37.0 01/13/2024    HGB 7.9 01/13/2024    HCT 26.4 01/13/2024    .0 01/13/2024    CREATSERUM 0.68 01/13/2024    BUN 16 01/13/2024     01/13/2024    K 3.7 01/13/2024     01/13/2024    CO2 26.0 01/13/2024    GLU 97 01/13/2024    CA 9.9 01/13/2024       Imaging:  MRI STROKE BRAIN DWI ONLY(NO IV)(CPT=70551)    Result Date: 1/12/2024  CONCLUSION:  Severely limited study with motion artifact on a DWI only study demonstrating no definite evidence of infarct.   LOCATION:  [facility]   Dictated by (CST): Nomi Dawson MD on 1/12/2024 at 4:56 PM     Finalized by (CST): Nomi Dawson MD on 1/12/2024 at 4:59 PM       CT CHEST PE AORTA (IV ONLY) (CPT=71260)    Result Date: 1/10/2024  CONCLUSION:   1. Negative for pulmonary embolism.  2. 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  Larger areas of consolidation  involving the lower lobes and lingula may reflect additional sites of metastatic disease versus superimposed pneumonia.  The right lower lobe consolidation is increased in size compared to 01/08/2024.  3. Findings of extensive malignant lymphadenopathy of the thorax.  4. Widespread hepatic metastatic disease.    LOCATION:  [facility]   Dictated by (CST): Kati Davison MD on 1/10/2024 at 2:35 PM     Finalized by (CST): Kati Davison MD on 1/10/2024 at 2:46 PM       US PELVIS W DOPPLER (HJP=26942/22896)    Result Date: 1/9/2024  CONCLUSION:  Large left ovarian mass as described on the recent CT scan measuring up to 8.1 cm in maximal dimension, highly concerning for a left ovarian neoplasm.   LOCATION:  [facility]    Dictated by (CST): Bre Silva DO on 1/09/2024 at 10:16 PM     Finalized by (CST): Bre Silva DO on 1/09/2024 at 10:18 PM       US BIOPSY LIVER (CPT=76942/18435)    Result Date: 1/9/2024  CONCLUSION:  Technically successful ultrasound-guided core biopsy of a representative right hepatic lobe mass.   LOCATION:  [facility]     Dictated by (CST): Ranulfo Doshi MD on 1/09/2024 at 1:03 PM     Finalized by (CST): Ranulfo Doshi MD on 1/09/2024 at 1:04 PM       CT CHEST (PCB=45899)    Result Date: 1/8/2024  CONCLUSION:  Innumerable pulmonary masses are noted with large confluent masses at lung bases bilaterally.  There is mediastinal and hilar lymphadenopathy.  Findings are consistent with a diffusely metastatic disease.  LOCATION:     Dictated by (CST): Melvin Adan MD on 1/08/2024 at 7:53 PM     Finalized by (CST): Melvin Adan MD on 1/08/2024 at 7:58 PM       CT ABDOMEN PELVIS IV CONTRAST, NO ORAL (ER)    Result Date: 1/8/2024  CONCLUSION:  1. Findings concerning for metastatic disease.  There are innumerable nodules within the visualized lungs as well as innumerable metastatic lesions throughout the liver.  Additionally, there is an omental base mass within the right lower quadrant and a large mass which appears to be  emanating from the left ovary measuring 7.8 cm. 2. There is no discrete evidence of an acute intra-abdominal or pelvic process.   LOCATION:  [id number]   Dictated by (CST): Ricardo Sharongale  on 1/08/2024 at 12:17 PM     Finalized by (CST): Bre SilvaDO on 1/08/2024 at 12:25 PM       XR CHEST PA + LAT CHEST (CPT=71046)    Result Date: 1/8/2024  CONCLUSION:  1. Suggestion of multiple bilateral pulmonary nodules.  Recommend CT of the chest for further evaluation.   LOCATION:  [facility]   Dictated by (CST): Deyanira Whipple MD on 1/08/2024 at 10:27 AM     Finalized by (CST): Deyanira Whipple MD on 1/08/2024 at 10:29 AM         Impression and Plan:    Patient Active Problem List   Diagnosis    Hypokalemia    Metastatic malignant neoplasm, unspecified site (HCC)    Hyponatremia    Leukocytosis, unspecified type    Anemia, unspecified type    Cancer related pain    Palliative care by specialist    Goals of care, counseling/discussion       Impression  Metastatic malignancy based on imaging, unclear primary lesion.   Widely metastatic with lymphangitic spread in lungs, liver metastases, pelvic metastases, ovarian mass.  Liver biopsy was done 1/9 per IR.  Prelim pathology is malignant, but not much further clarification is yet available.   Probably carcinoma based on IHC.  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   She needed one unit PRBCs. Venofer x 3 has completed earlier this week.  Tachycardia, SOB.   CTA negative for PE  Pain controll--on PCA.  Palliative care following     Plan:  Await tissue diagnosis.  We noted results may be not be available until early next week. Has been sent for cosultation   2.   Monitor CBC. Received IV iron. Prn transfusion to keep hg >7 hm  3.    S/p  Endoscopic evaluation--negative for malignancy or bleeding source.  4.   Palliative care following.  Patient on PCA.   5. Discussed in detail with her son, Chano, at bedside. They are aware of findings os  widely/extensively metastatic disease. Clinically concerning for GYN malignancy, but path is not conclusive--awaiting consultation   -all questions and concerns addressed       Thank you for allowing me to participate in the care of your patient.    Jaylene Sherwood, DO

## 2024-01-14 NOTE — PLAN OF CARE
Pt oriented x4, lethargic throughout day. MD aware. VSS. Afebrile. Pt c/o pain during day, uses PCA herself. Pt reports PCA working to relieve pain. Medications admin per MAR. Pt ambulated safely in room w/ walker and x1 assist. Pt had US of abdomen, results in chart. Fall and safety precautions in place. Call light within reach.

## 2024-01-14 NOTE — PLAN OF CARE
Pt received A&Ox3-4. VSS. 4L NC, Afebrile. Abd pain, on PCA dilaudid running per MAR. C/o nausea, zofran given prn. IVF. Call light within reach. Fall precautions in place.     Problem: PAIN - ADULT  Goal: Verbalizes/displays adequate comfort level or patient's stated pain goal  Description: INTERVENTIONS:  - Encourage pt to monitor pain and request assistance  - Assess pain using appropriate pain scale  - Administer analgesics based on type and severity of pain and evaluate response  - Implement non-pharmacological measures as appropriate and evaluate response  - Consider cultural and social influences on pain and pain management  - Manage/alleviate anxiety  - Utilize distraction and/or relaxation techniques  - Monitor for opioid side effects  - Notify MD/LIP if interventions unsuccessful or patient reports new pain  - Anticipate increased pain with activity and pre-medicate as appropriate  Outcome: Progressing     Problem: GASTROINTESTINAL - ADULT  Goal: Minimal or absence of nausea and vomiting  Description: INTERVENTIONS:  - Maintain adequate hydration with IV or PO as ordered and tolerated  - Nasogastric tube to low intermittent suction as ordered  - Evaluate effectiveness of ordered antiemetic medications  - Provide nonpharmacologic comfort measures as appropriate  - Advance diet as tolerated, if ordered  - Obtain nutritional consult as needed  - Evaluate fluid balance  Outcome: Progressing  Goal: Maintains or returns to baseline bowel function  Description: INTERVENTIONS:  - Assess bowel function  - Maintain adequate hydration with IV or PO as ordered and tolerated  - Evaluate effectiveness of GI medications  - Encourage mobilization and activity  - Obtain nutritional consult as needed  - Establish a toileting routine/schedule  - Consider collaborating with pharmacy to review patient's medication profile  Outcome: Progressing

## 2024-01-14 NOTE — PROGRESS NOTES
[facility]  Progress Note    Alysia Campos Patient Status:  Inpatient    7/15/1970 MRN [id number]   Location [facility] 4NW-A Attending Harshal Aponte MD   Hosp Day # 6 PCP Renetta Santos DO     Subjective:  Alysia Campos is a(n) 53 year old female.    Hospital course to date: started flomax, tried Purewick during part of the night last night, remembers them 'dumping the urine a couple of times overnight'    Current complaints: family present, sitting up, trying to get more comfortable.    Tolerated the first dose of Flomax, will plan to cont    Objective:  BP (!) 135/99 (BP Location: Right arm)   Pulse (!) 121   Temp 97.3 °F (36.3 °C) (Oral)   Resp 20   Ht 5' 2\" (1.575 m)   Wt 165 lb (74.8 kg)   LMP  (LMP Unknown)   SpO2 94%   BMI 30.18 kg/m²    Uop 1450 yesterday  Trying to improve pain control  Trying to use less dilaudid so can 'be more coherent'      Laboratory Data:  Lab Results   Component Value Date    WBC 32.2 2024    HGB 7.6 2024    HCT 25.8 2024    .0 2024    CREATSERUM 0.62 2024    BUN 16 2024     2024    K 3.6 2024     2024    CO2 27.0 2024    GLU 95 2024    CA 9.7 2024     Lab Results   Component Value Date    CREATSERUM 0.62 2024    CREATSERUM 0.52 (L) 2024    CREATSERUM 0.68 2024    CREATSERUM 0.55 2024    CREATSERUM 0.63 2024    CREATSERUM 0.56 2024         Assessment:  Patient Active Problem List   Diagnosis    Hypokalemia    Metastatic malignant neoplasm, unspecified site (HCC)    Hyponatremia    Leukocytosis, unspecified type    Anemia, unspecified type    Cancer related pain    Palliative care by specialist    Goals of care, counseling/discussion       Sensation of bladder pressure without oliguria/urinary retention    Plan:  Cont Flomax  Reviewed for relief could also try a IFC trial. Discussed risks of UTI with indwelling. Holding off.    Saqib AARON  MD [place]  1/14/2024  9:24 AM

## 2024-01-14 NOTE — CM/SW NOTE
01/14/24 1300   CM/SW Referral Data   Referral Source Physician   Reason for Referral Discharge planning   Informant EMR;Clinical Staff Member     Pt is 52 yo female with newly diagnosed metastatic disease.  Palliative care is following.    PT/OT rec [facility] - referral in [place]. Sw to follow up once responses received.  Pt has [employer] insurance.    Per PT:  HOME SITUATION  Type of Home: House   Home Layout: Two level  Stairs to Enter : 2  Railing: Yes  Stairs to Bedroom: 12  Railing: Yes     Lives With: Daughter (15 year old daughter)  Drives: Yes  Patient Owned Equipment: None     Prior Level of [place]: Pt lives in a two level home with 15 year old daughter. Pt has two other children, one is 26 years old and her son is 29 and lives in [place] with spouse. Pt is independent with ADL, IADL, ambulation, driving, and works full time.     Denita Aguayo LCSW  /Discharge Planner

## 2024-01-14 NOTE — PROGRESS NOTES
[facility]    Progress Note     Alysia Campos Patient Status:  Inpatient    7/15/1970 MRN [id number]   Location [facility] 4NW-A Attending Harshal Aponte MD   Hosp Day # 6 PCP Renetta Santos DO     Follow up for: The primary encounter diagnosis was Metastatic malignant neoplasm, unspecified site (HCC). Diagnoses of Hyponatremia, Leukocytosis, unspecified type, and Anemia, unspecified type were also pertinent to this visit.      Interval History/Subjective:     Unable to tolerate MRI   DEBI overnight  Afebrile, HDS  Labs stable    More drowsy with increased prn dilaudid dose. Abd pain stable. Having a lot of anxiety about waiting for diagnosis. Family concerned that she started developing visual hallucinations overnight    Vital signs:  Temp:  [97.3 °F (36.3 °C)-98.9 °F (37.2 °C)] 97.8 °F (36.6 °C)  Pulse:  [107-121] 120  Resp:  [20-22] 22  BP: (132-139)/(68-99) 139/68  SpO2:  [94 %-100 %] 100 %    Physical Exam:      General: No acute distress. Comfortable, nontoxic Appears pale  HEENT: Normocephalic atraumatic. Moist mucous membranes; Sclera anicteric. EOMI  Neck: Supple, +JVD  Respiratory: Diminished at bilateral bases, otherwise CTA; ;reg resp rate & effort, no wheezes/crackles   Cardiovascular: S1, S2. Tachycardic. No murmurs appreciable   Chest and Back: No tenderness or deformity.  Abdomen: distended with generalized TTP; no guarding/rebound tenderness; +hepatomegaly  Neurologic: Drowsy but arousable to voice; No focal neurological deficits. CNII-XII grossly intact.  Musculoskeletal: Moves all extremities.  Extremities: No edema or cyanosis.  Integument: No rashes or lesions.   Psychiatric: Appropriate mood and affect.       Diagnostic Data:      Labs:  Recent Labs   Lab 24  0722 24  1726 01/10/24  0712 01/10/24  1611 24  0704 24  0626 24  0624 24  0702   WBC 30.8*  --  35.3*  --  38.7* 37.5* 37.0* 32.2*   HGB 6.1*   < > 6.6* 7.1* 7.5* 7.5* 7.9* 7.6*   MCV 67.6*   --  71.3*  --  70.8* 72.6* 73.7* 76.6*   .0  --  343.0  --  347.0 333.0 345.0 343.0   BAND 2  --  1  --   --  9 1 6   INR 1.07  --   --   --   --   --   --   --     < > = values in this interval not displayed.       Recent Labs   Lab 01/08/24  0954 01/08/24  1250 01/11/24  0704 01/11/24  1622 01/13/24  0624 01/13/24  1752 01/14/24  0702   *   < > 115*   < > 97 104* 95   BUN 12   < > 14   < > 16 15 16   CREATSERUM 0.60   < > 0.57   < > 0.68 0.52* 0.62   CA 9.1   < > 9.3   < > 9.9 9.7 9.7   ALB 3.6  --  3.1*  --   --   --   --    *   < > 130*   < > 136 140 139   K 3.5   < > 3.6   < > 3.7 3.6 3.6   CL 92*   < > 99   < > 105 107 112   CO2 23.0   < > 21.0   < > 26.0 25.0 27.0   ALKPHO 294*  --  211*  --   --   --   --    AST 75*  --  96*  --   --   --   --    ALT 36  --  39  --   --   --   --    BILT 0.8  --  0.9  --   --   --   --    TP 7.7  --  7.0  --   --   --   --     < > = values in this interval not displayed.       Recent Labs   Lab 01/09/24  0722   PTP 13.9   INR 1.07       No results for input(s): \"TROP\", \"CK\" in the last 168 hours.         Imaging: Imaging data reviewed in Epic.    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   Hyponatremia  Acute on Chronic Anemia      Plan    Diplopia  -Unclear if secondary to sedation given increased pain requirements  -Given evidence of widely metastatic disease will  need to check MRI brain to rule out metastases, pending  -- could not tolerate MRI Brain, will need done with anesthesia, tentatively Monday 1/15    Abdominal Pain / Distention  -- likely secondary to massive hepatomegaly noted on CT   -- KUB normal   -- US Abd with only trace ascites    Urinary Retention  -- BS ~217mL overnight, pt endorses stress incontinence & urinary hesitancy/incomplete voiding  --  consult per Onc -> Flomax trial     Hypoxia, tachycardia -- stable   - BNP elevated may be reflective of volume overload given her persistent IV fluids and recent transfusions  - CT PE negative for PE but with extensive mediastinal and pulmonary tumor burden as the likely cultprit  -- Cont supplemental O2, wean as tolerated     Ovarian Mass  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   -- CT Abd w/ multiple pulmonary, hepatic metastases, omental mass, 7.8cm left ovarian mass  -- CT Chest performed 1/8 w/ innumerable pulmonary nodules  w/ large confluent masses at bilateral bases  -- Gyn Onc consulted, plan discussed  -Oncology consult, recommendations reviewed, pending results of biopsy  - US pelvis unremarkable   -Status post US guided biopsy of liver 1/9/2024, pathology pending  -Continue Norco, Dilaudid as needed for pain  -1/10: Based on patient's tenuous clinical status, may require first round of chemotherapy as inpatient per oncology, pending initial pathology results    Hyponatremia-resolved   Possible SIADH   -- likely hypovolemic in the setting of poor PO intake, nausea/vomiting  -- Na 126 on admission, s/p 1L NS bolus  - Sodium correcting appropriately, change BMP to BID  --resolved s/p tolvaptan x1 1/12, change mIVF to TKO      Acute on Chronic Anemia  -- stable  -- recent baseline Hgb 7.7-8.0, 7.0 on admission  -- no e/o active bleeding or hemodynamic instability  -- 1 unit PRBC given 1/9 with minimal response, additional unit ordered today  -- Iron studies consistent with deficiency,  Venofer ordered  - CTM, transfuse to maintain goal hemoglobin >7.0  - GI consulted due to persistent anemia, elevated CEA levels  -- EGD/Colonoscopy w/o active bleeding or clear e/o malignancy, biopsies taken, path pending; no further interventions per GI   -- 1/14: Hgb now stable >48 hours, resume lovenox for dvt prophylaxis     DVT Mechanical Prophylaxis:   SCDs,    DVT Pharmacologic Prophylaxis   Medication    enoxaparin (Lovenox) 40 MG/0.4ML SUBQ injection 40 mg              Code Status: FULL     Dispo: inpatient; EVA >2 midnights    Plan of care discussed with patient and/or family at bedside.    N Maikel Aponte MD  [facility]   [phone]      This note was created using voice recognition technology. It may include inadvertent transcriptional errors. Any such errors should be contextually interpreted and should not be taken to alter the content or the meaning.     Note to Patient: The 21st Century Cures Act makes medical notes like these available to patients in the interest of transparency. However, be advised this is a medical document. It is intended as peer to peer communication. It is written in medical language and may contain abbreviations or verbiage that are unfamiliar. It may appear blunt or direct. Medical documents are intended to carry relevant information, facts as evident, and the clinical opinion of the practitioner and not intended to be the primary source of your information.  Please refer directly to myself or clinical staff for information regarding plan of care.

## 2024-01-15 ENCOUNTER — ANESTHESIA (OUTPATIENT)
Dept: MRI IMAGING | Facility: HOSPITAL | Age: 54
End: 2024-01-15
Payer: COMMERCIAL

## 2024-01-15 ENCOUNTER — ANESTHESIA EVENT (OUTPATIENT)
Dept: MRI IMAGING | Facility: HOSPITAL | Age: 54
End: 2024-01-15
Payer: COMMERCIAL

## 2024-01-15 RX ORDER — ONDANSETRON 2 MG/ML
INJECTION INTRAMUSCULAR; INTRAVENOUS AS NEEDED
Status: DISCONTINUED | OUTPATIENT
Start: 2024-01-15 | End: 2024-01-15 | Stop reason: SURG

## 2024-01-15 RX ORDER — DEXAMETHASONE SODIUM PHOSPHATE 4 MG/ML
VIAL (ML) INJECTION AS NEEDED
Status: DISCONTINUED | OUTPATIENT
Start: 2024-01-15 | End: 2024-01-15 | Stop reason: SURG

## 2024-01-15 RX ORDER — LIDOCAINE HYDROCHLORIDE 10 MG/ML
INJECTION, SOLUTION EPIDURAL; INFILTRATION; INTRACAUDAL; PERINEURAL AS NEEDED
Status: DISCONTINUED | OUTPATIENT
Start: 2024-01-15 | End: 2024-01-15 | Stop reason: SURG

## 2024-01-15 RX ORDER — PHENYLEPHRINE HCL 10 MG/ML
VIAL (ML) INJECTION AS NEEDED
Status: DISCONTINUED | OUTPATIENT
Start: 2024-01-15 | End: 2024-01-15 | Stop reason: SURG

## 2024-01-15 RX ADMIN — ONDANSETRON 4 MG: 2 INJECTION INTRAMUSCULAR; INTRAVENOUS at 16:50:00

## 2024-01-15 RX ADMIN — LIDOCAINE HYDROCHLORIDE 5 ML: 10 INJECTION, SOLUTION EPIDURAL; INFILTRATION; INTRACAUDAL; PERINEURAL at 16:20:00

## 2024-01-15 RX ADMIN — PHENYLEPHRINE HCL 100 MCG: 10 MG/ML VIAL (ML) INJECTION at 16:42:00

## 2024-01-15 RX ADMIN — SODIUM CHLORIDE: 9 INJECTION, SOLUTION INTRAVENOUS at 16:10:00

## 2024-01-15 RX ADMIN — DEXAMETHASONE SODIUM PHOSPHATE 4 MG: 4 MG/ML VIAL (ML) INJECTION at 16:50:00

## 2024-01-15 NOTE — PLAN OF CARE
atient is A/O x4. VSS. Afebrile. 4-5 L O2 per NC. Pain managed per continuous PCA, see chart.MRI ordered with sedation scheduled for 1630; patient NPO since 0000 sips with meds.  Patient ambulates with assist to the commode. Voids. All medications given per MAR. IVF infusing per orders. Safety precautions in place. Call light within reach. Family at bedside.     Problem: PAIN - ADULT  Goal: Verbalizes/displays adequate comfort level or patient's stated pain goal  Description: INTERVENTIONS:  - Encourage pt to monitor pain and request assistance  - Assess pain using appropriate pain scale  - Administer analgesics based on type and severity of pain and evaluate response  - Implement non-pharmacological measures as appropriate and evaluate response  - Consider cultural and social influences on pain and pain management  - Manage/alleviate anxiety  - Utilize distraction and/or relaxation techniques  - Monitor for opioid side effects  - Notify MD/LIP if interventions unsuccessful or patient reports new pain  - Anticipate increased pain with activity and pre-medicate as appropriate  Outcome: Progressing     Problem: GASTROINTESTINAL - ADULT  Goal: Minimal or absence of nausea and vomiting  Description: INTERVENTIONS:  - Maintain adequate hydration with IV or PO as ordered and tolerated  - Nasogastric tube to low intermittent suction as ordered  - Evaluate effectiveness of ordered antiemetic medications  - Provide nonpharmacologic comfort measures as appropriate  - Advance diet as tolerated, if ordered  - Obtain nutritional consult as needed  - Evaluate fluid balance  Outcome: Progressing  Goal: Maintains or returns to baseline bowel function  Description: INTERVENTIONS:  - Assess bowel function  - Maintain adequate hydration with IV or PO as ordered and tolerated  - Evaluate effectiveness of GI medications  - Encourage mobilization and activity  - Obtain nutritional consult as needed  - Establish a toileting  routine/schedule  - Consider collaborating with pharmacy to review patient's medication profile  Outcome: Progressing     Problem: HEMATOLOGIC - ADULT  Goal: Maintains hematologic stability  Description: INTERVENTIONS  - Assess for signs and symptoms of bleeding or hemorrhage  - Monitor labs and vital signs for trends  - Administer supportive blood products/factors, fluids and medications as ordered and appropriate  - Administer supportive blood products/factors as ordered and appropriate  Outcome: Progressing

## 2024-01-15 NOTE — ANESTHESIA PREPROCEDURE EVALUATION
PRE-OP EVALUATION    Patient Name: Alysia Campos    Admit Diagnosis: Hyponatremia [E87.1]  Metastatic malignant neoplasm, unspecified site (HCC) [C79.9]  Anemia, unspecified type [D64.9]  Leukocytosis, unspecified type [D72.829]    Pre-op Diagnosis: * No pre-op diagnosis entered *        Anesthesia Procedure: MRI BRAIN (W+WO) (CPT=70553)    * No surgeons found in log *    Pre-op vitals reviewed.  Temp: 98.2 °F (36.8 °C)  Pulse: 110  Resp: 20  BP: 130/62  SpO2: 97 %  Body mass index is 30.18 kg/m².    Current medications reviewed.  Hospital Medications:   HYDROmorphone in sodium chloride 0.9% (Dilaudid) 20 mg/100mL PCA premix   Intravenous Continuous    QUEtiapine (SEROquel) tab 25 mg  25 mg Oral Nightly    enoxaparin (Lovenox) 40 MG/0.4ML SUBQ injection 40 mg  40 mg Subcutaneous Q24H    ketorolac (Toradol) 15 MG/ML injection 15 mg  15 mg Intravenous Q6H PRN    tamsulosin (Flomax) cap 0.4 mg  0.4 mg Oral Nightly    busPIRone (Buspar) tab 5 mg  5 mg Oral BID    [COMPLETED] haloperidol lactate (Haldol) 5 MG/ML injection 2 mg  2 mg Intravenous Once PRN    [COMPLETED] tolvaptan (Samsca) tab 15 mg  15 mg Oral Once    [COMPLETED] LORazepam (Ativan) 2 mg/mL injection 0.25 mg  0.25 mg Intravenous Once    [COMPLETED] potassium chloride 40 mEq in 250mL sodium chloride 0.9% IVPB premix  40 mEq Intravenous Once    sennosides (Senokot) tab 8.6 mg  8.6 mg Oral BID    benzonatate (Tessalon) cap 100 mg  100 mg Oral Q8H    sodium chloride 0.9% infusion   Intravenous Continuous    HYDROmorphone 0.4 mg BOLUS FROM PCA  0.4 mg Intravenous Q30 Min PRN    naloxone (Narcan) 0.4 MG/ML injection 0.08 mg  0.08 mg Intravenous Q5 Min PRN    acetaminophen (Tylenol) tab 650 mg  650 mg Oral Q8H    hydrOXYzine (Atarax) tab 25 mg  25 mg Oral TID PRN    guaiFENesin ER (Mucinex) 12 hr tab 600 mg  600 mg Oral BID    [COMPLETED] sodium chloride 0.9% infusion   Intravenous Once    [COMPLETED] furosemide (Lasix) 10 mg/mL injection 20 mg  20 mg Intravenous  Once    [COMPLETED] bisacodyl (Dulcolax) DR tab 20 mg  20 mg Oral Once    [COMPLETED] polyethylene glycol-electrolyte (Golytely) 236 g oral solution 2,000 mL  2,000 mL Oral Once    [COMPLETED] polyethylene glycol-electrolyte (Golytely) 236 g oral solution 2,000 mL  2,000 mL Oral Once    multivitamin (Centrum) chewable tab (Adult) 1 tablet  1 tablet Oral Daily    [COMPLETED] iopamidol 76% (ISOVUE-370) injection for power injector  100 mL Intravenous ONCE PRN    ipratropium-albuterol (Duoneb) 0.5-2.5 (3) MG/3ML inhalation solution 3 mL  3 mL Nebulization Q6H PRN    [COMPLETED] iron sucrose (Venofer) 20 mg/mL injection 200 mg  200 mg Intravenous Daily    [] midazolam (Versed) 2 MG/2ML injection 1 mg  1 mg Intravenous Q5 Min PRN    [] fentaNYL (Sublimaze) 50 mcg/mL injection 50 mcg  50 mcg Intravenous Q5 Min PRN    [COMPLETED] potassium chloride (K-Dur) tab 40 mEq  40 mEq Oral Once    dextromethorphan-guaiFENesin (Robitussin-DM)  mg/5mL oral solution 5 mL  5 mL Oral Q6H PRN    [COMPLETED] sodium chloride 0.9 % IV bolus 1,000 mL  1,000 mL Intravenous Once    [COMPLETED] ondansetron (Zofran) 4 MG/2ML injection 4 mg  4 mg Intravenous Once    [COMPLETED] iopamidol 76% (ISOVUE-370) injection for power injector  100 mL Intravenous ONCE PRN    [COMPLETED] acetaminophen (Tylenol Extra Strength) tab 1,000 mg  1,000 mg Oral Once    [COMPLETED] LORazepam (Ativan) tab 0.5 mg  0.5 mg Oral Once PRN    ondansetron (Zofran) 4 MG/2ML injection 4 mg  4 mg Intravenous Q6H PRN    prochlorperazine (Compazine) 10 MG/2ML injection 5 mg  5 mg Intravenous Q8H PRN    [COMPLETED] sodium chloride 0.9% infusion   Intravenous Once    [COMPLETED] furosemide (Lasix) 10 mg/mL injection 20 mg  20 mg Intravenous Once       Outpatient Medications:     Medications Prior to Admission   Medication Sig Dispense Refill Last Dose    ibuprofen 400 MG Oral Tab Take 1 tablet (400 mg total) by mouth every 6 (six) hours as needed for Pain.    1/8/2024       Allergies: Mold      Anesthesia Evaluation    Patient summary reviewed.    Anesthetic Complications  (-) history of anesthetic complications         GI/Hepatic/Renal                                 Cardiovascular      ECG reviewed.  Exercise tolerance: poor     MET: <=4  Unable to assess MET.  (+) obesity                                       Endo/Other               (+) anemia                   Pulmonary               (+) shortness of breath            Neuro/Psych                   (+) headaches           Cancer with mets         Past Surgical History:   Procedure Laterality Date    COLONOSCOPY N/A 1/11/2024    Procedure: .;  Surgeon: Ubaldo Morton MD;  Location:  ENDOSCOPY     Social History     Socioeconomic History    Marital status:    Tobacco Use    Smoking status: Never    Smokeless tobacco: Never   Vaping Use    Vaping Use: Never used   Substance and Sexual Activity    Alcohol use: Not Currently    Drug use: Never     History   Drug Use Unknown     Available pre-op labs reviewed.  Lab Results   Component Value Date    WBC 29.6 (H) 01/15/2024    RBC 3.40 (L) 01/15/2024    HGB 7.6 (L) 01/15/2024    HCT 25.8 (L) 01/15/2024    MCV 75.9 (L) 01/15/2024    MCH 22.4 (L) 01/15/2024    MCHC 29.5 (L) 01/15/2024    RDW 23.6 01/15/2024    .0 01/15/2024     Lab Results   Component Value Date     01/15/2024    K 3.7 01/15/2024     01/15/2024    CO2 22.0 01/15/2024    BUN 23 01/15/2024    CREATSERUM 0.80 01/15/2024    GLU 79 01/15/2024    CA 10.1 01/15/2024     Lab Results   Component Value Date    INR 1.07 01/09/2024         Airway      Mallampati: II  Mouth opening: >3 FB  TM distance: > 6 cm   Cardiovascular    Cardiovascular exam normal.         Dental             Pulmonary    Pulmonary exam normal.                 Other findings              ASA: 3   Plan: general  NPO status verified and patient meets guidelines.          Plan/risks discussed with: patient and  child/children                Present on Admission:   Hypokalemia

## 2024-01-15 NOTE — PHYSICAL THERAPY NOTE
Attempted to see pt for follow up therapy session today. Couple family members present in room. Discussed with pt/family role and goal of physical therapy services in hospital. Per family member pt has been up to commode with assist from family and staff. Pt with incr pain levels, just had pushed PCA pump button prior to entering room. Plans for MRI brain with sedation later today. Will follow up as schedule allows and as pt remains medically appropriate. RN aware.

## 2024-01-15 NOTE — PROGRESS NOTES
[facility]  Urology Progress Note    Alysia Campos Patient Status:  Inpatient    7/15/1970 MRN [id number]   Location [facility] 4NW-A Attending Maikel Valera DO   Hosp Day # 7 PCP Renetta Santos DO     Subjective:  Alysia Campos is a(n) 53 year old female.    Current complaints: bladder pressure. Nurse reports blood with wiping.    Objective:  General appearance: appears uncomfortable  Blood pressure 125/62, pulse 113, temperature 98.6 °F (37 °C), temperature source Oral, resp. rate 22, height 5' 2\" (1.575 m), weight 165 lb (74.8 kg), SpO2 96%.  Abdomen: distended    Lab Results   Component Value Date    WBC 29.6 01/15/2024    HGB 7.6 01/15/2024    HCT 25.8 01/15/2024    .0 01/15/2024    CREATSERUM 0.80 01/15/2024    BUN 23 01/15/2024     01/15/2024    K 3.7 01/15/2024     01/15/2024    CO2 22.0 01/15/2024    GLU 79 01/15/2024    CA 10.1 01/15/2024       No results found for this visit on 24.     US ABDOMEN LIMITED (CPT=76705)    Result Date: 2024  CONCLUSION:  Limited ultrasound of the abdomen was performed evaluating all 4 quadrants of the abdomen and pelvis for ascites.   There is trace ascites identified adjacent to the liver.  No large pocket of ascites demonstrated.   LOCATION:  [id number]   Dictated by (CST): Sherita Whitman MD on 2024 at 1:31 PM     Finalized by (CST): Sherita Whitman MD on 2024 at 1:32 PM       XR ABDOMEN (1 VIEW) (CPT=74018)    Result Date: 2024  CONCLUSION:  No acute disease.    LOCATION:  [facility]   Dictated by (CST): Nomi Dawson MD on 2024 at 6:58 PM     Finalized by (CST): Nomi Dawson MD on 2024 at 6:59 PM         Assessment:    Sensation of bladder pressure without oliguria/urinary retention     Plan:    Dr. Silas reviewed cystoscopy previously - suspect bleeding is vaginal  Cont Flomax  Reviewed for relief could also try a IFC trial. Discussed risks of UTI with indwelling. Holding off.    Above discussed with patient,  nurse, Dr. Rosen.      Perla Hugo PA-C  [facility]  Department of Urology  1/15/2024  8:12 AM

## 2024-01-15 NOTE — PLAN OF CARE
A&Ox4, drowsy  NPO after midnight - scheduled MRI Brain w/sedation (screening complete)  Pain managed w/PCA dilaudid:  Continuous Infusion Rate (in mg/hr): 0.4   PCA Dose (in mg): 0.2   PCA Lockout (in minutes): 10   One Hour Delivery Limit (in mg): 2     Walker x1 assist, commode at bedside.   Strict I&O's        Problem: PAIN - ADULT  Goal: Verbalizes/displays adequate comfort level or patient's stated pain goal  Description: INTERVENTIONS:  - Encourage pt to monitor pain and request assistance  - Assess pain using appropriate pain scale  - Administer analgesics based on type and severity of pain and evaluate response  - Implement non-pharmacological measures as appropriate and evaluate response  - Consider cultural and social influences on pain and pain management  - Manage/alleviate anxiety  - Utilize distraction and/or relaxation techniques  - Monitor for opioid side effects  - Notify MD/LIP if interventions unsuccessful or patient reports new pain  - Anticipate increased pain with activity and pre-medicate as appropriate  Outcome: Progressing

## 2024-01-15 NOTE — PROGRESS NOTES
[facility]  Follow up note    Alysia Campos Patient Status:  Inpatient    7/15/1970 MRN [id number]   Location [facility] 4NW-A Attending Harshal Aponte MD   Hosp Day # 7 PCP Renetta Santos, DO     Interval History--  On PCA for pain control, still with more pain. Palliative care following   Due for Brain MRI today with sedation  Pathology results haven't been finalized    History:  Past Medical History:   Diagnosis Date    Anemia     Migraines      Past Surgical History:   Procedure Laterality Date    COLONOSCOPY N/A 2024    Procedure: .;  Surgeon: Ubaldo Morton MD;  Location:  ENDOSCOPY     Family History   Problem Relation Age of Onset    Breast Cancer Mother 60 - 69      reports that she has never smoked. She has never used smokeless tobacco. She reports that she does not currently use alcohol. She reports that she does not use drugs.    Allergies:  Allergies   Allergen Reactions    Mold TINITUS       Medications:    Current Facility-Administered Medications:     HYDROmorphone in sodium chloride 0.9% (Dilaudid) 20 mg/100mL PCA premix, , Intravenous, Continuous    QUEtiapine (SEROquel) tab 25 mg, 25 mg, Oral, Nightly    enoxaparin (Lovenox) 40 MG/0.4ML SUBQ injection 40 mg, 40 mg, Subcutaneous, Q24H    ketorolac (Toradol) 15 MG/ML injection 15 mg, 15 mg, Intravenous, Q6H PRN    tamsulosin (Flomax) cap 0.4 mg, 0.4 mg, Oral, Nightly    busPIRone (Buspar) tab 5 mg, 5 mg, Oral, BID    sennosides (Senokot) tab 8.6 mg, 8.6 mg, Oral, BID    benzonatate (Tessalon) cap 100 mg, 100 mg, Oral, Q8H    sodium chloride 0.9% infusion, , Intravenous, Continuous    HYDROmorphone 0.4 mg BOLUS FROM PCA, 0.4 mg, Intravenous, Q30 Min PRN    naloxone (Narcan) 0.4 MG/ML injection 0.08 mg, 0.08 mg, Intravenous, Q5 Min PRN    acetaminophen (Tylenol) tab 650 mg, 650 mg, Oral, Q8H    hydrOXYzine (Atarax) tab 25 mg, 25 mg, Oral, TID PRN    guaiFENesin ER (Mucinex) 12 hr tab 600 mg, 600 mg, Oral, BID    multivitamin  (Centrum) chewable tab (Adult) 1 tablet, 1 tablet, Oral, Daily    ipratropium-albuterol (Duoneb) 0.5-2.5 (3) MG/3ML inhalation solution 3 mL, 3 mL, Nebulization, Q6H PRN    dextromethorphan-guaiFENesin (Robitussin-DM)  mg/5mL oral solution 5 mL, 5 mL, Oral, Q6H PRN    ondansetron (Zofran) 4 MG/2ML injection 4 mg, 4 mg, Intravenous, Q6H PRN    prochlorperazine (Compazine) 10 MG/2ML injection 5 mg, 5 mg, Intravenous, Q8H PRN    Review of Systems:  A 10-point review of systems was done with pertinent positives and negatives per the HPI.    Physical Exam:   Blood pressure 125/62, pulse 113, temperature 98.6 °F (37 °C), temperature source Oral, resp. rate 22, height 5' 2\" (1.575 m), weight 165 lb (74.8 kg), SpO2 96%.   General: Patient is alert; appeared uncomfortable, but improved after she pressed for her pain med   Chest: Clear to auscultation. + cough    Heart: Regular rate     Abdomen: distended.   Extremities: + b/l LE edema    Neurological: Grossly intact.          Laboratory Data:    Lab Results   Component Value Date    WBC 29.6 01/15/2024    HGB 7.6 01/15/2024    HCT 25.8 01/15/2024    .0 01/15/2024    CREATSERUM 0.80 01/15/2024    BUN 23 01/15/2024     01/15/2024    K 3.7 01/15/2024     01/15/2024    CO2 22.0 01/15/2024    GLU 79 01/15/2024    CA 10.1 01/15/2024       Imaging:  US ABDOMEN LIMITED (CPT=76705)    Result Date: 1/14/2024  CONCLUSION:  Limited ultrasound of the abdomen was performed evaluating all 4 quadrants of the abdomen and pelvis for ascites.   There is trace ascites identified adjacent to the liver.  No large pocket of ascites demonstrated.   LOCATION:  [id number]   Dictated by (CST): Sherita Whitman MD on 1/14/2024 at 1:31 PM     Finalized by (CST): Sherita Whitman MD on 1/14/2024 at 1:32 PM       XR ABDOMEN (1 VIEW) (CPT=74018)    Result Date: 1/13/2024  CONCLUSION:  No acute disease.    LOCATION:  [facility]   Dictated by (CST): Nomi Dawson MD on 1/13/2024 at 6:58 PM      Finalized by (CST): Nomi Dawson MD on 1/13/2024 at 6:59 PM       MRI STROKE BRAIN DWI ONLY(NO IV)(CPT=70551)    Result Date: 1/12/2024  CONCLUSION:  Severely limited study with motion artifact on a DWI only study demonstrating no definite evidence of infarct.   LOCATION:  [facility]   Dictated by (CST): Nmoi Dawson MD on 1/12/2024 at 4:56 PM     Finalized by (CST): Nomi Dawson MD on 1/12/2024 at 4:59 PM       CT CHEST PE AORTA (IV ONLY) (CPT=71260)    Result Date: 1/10/2024  CONCLUSION:   1. Negative for pulmonary embolism.  2. 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  Larger areas of consolidation involving the lower lobes and lingula may reflect additional sites of metastatic disease versus superimposed pneumonia.  The right lower lobe consolidation is increased in size compared to 01/08/2024.  3. Findings of extensive malignant lymphadenopathy of the thorax.  4. Widespread hepatic metastatic disease.    LOCATION:  [facility]   Dictated by (CST): Kati Davison MD on 1/10/2024 at 2:35 PM     Finalized by (CST): Kati Davison MD on 1/10/2024 at 2:46 PM       US PELVIS W DOPPLER (MNZ=93530/84977)    Result Date: 1/9/2024  CONCLUSION:  Large left ovarian mass as described on the recent CT scan measuring up to 8.1 cm in maximal dimension, highly concerning for a left ovarian neoplasm.   LOCATION:  [facility]    Dictated by (CST): Bre Silva DO on 1/09/2024 at 10:16 PM     Finalized by (CST): Bre Silva DO on 1/09/2024 at 10:18 PM       US BIOPSY LIVER (CPT=76942/98182)    Result Date: 1/9/2024  CONCLUSION:  Technically successful ultrasound-guided core biopsy of a representative right hepatic lobe mass.   LOCATION:  [facility]     Dictated by (CST): Ranulfo Doshi MD on 1/09/2024 at 1:03 PM     Finalized by (CST): Ranulfo Doshi MD on 1/09/2024 at 1:04 PM       CT CHEST (NHX=52800)    Result Date: 1/8/2024  CONCLUSION:  Innumerable pulmonary masses are noted with large confluent masses at  lung bases bilaterally.  There is mediastinal and hilar lymphadenopathy.  Findings are consistent with a diffusely metastatic disease.  LOCATION:     Dictated by (CST): Melvin Adan MD on 1/08/2024 at 7:53 PM     Finalized by (CST): Melvin Adan MD on 1/08/2024 at 7:58 PM       CT ABDOMEN PELVIS IV CONTRAST, NO ORAL (ER)    Result Date: 1/8/2024  CONCLUSION:  1. Findings concerning for metastatic disease.  There are innumerable nodules within the visualized lungs as well as innumerable metastatic lesions throughout the liver.  Additionally, there is an omental base mass within the right lower quadrant and a large mass which appears to be emanating from the left ovary measuring 7.8 cm. 2. There is no discrete evidence of an acute intra-abdominal or pelvic process.   LOCATION:  [id number]   Dictated by (CST): Bre Silva DO on 1/08/2024 at 12:17 PM     Finalized by (CST): Bre Silva DO on 1/08/2024 at 12:25 PM       XR CHEST PA + LAT CHEST (CPT=71046)    Result Date: 1/8/2024  CONCLUSION:  1. Suggestion of multiple bilateral pulmonary nodules.  Recommend CT of the chest for further evaluation.   LOCATION:  [facility]   Dictated by (CST): Deyanira Whipple MD on 1/08/2024 at 10:27 AM     Finalized by (CST): Deyanira Whipple MD on 1/08/2024 at 10:29 AM         Impression and Plan:    Patient Active Problem List   Diagnosis    Hypokalemia    Metastatic malignant neoplasm, unspecified site (HCC)    Hyponatremia    Leukocytosis, unspecified type    Anemia, unspecified type    Cancer related pain    Palliative care by specialist    Goals of care, counseling/discussion       Impression  Metastatic malignancy based on imaging, unclear primary lesion.   Widely metastatic with lymphangitic spread in lungs, liver metastases, pelvic metastases, ovarian mass.  Liver biopsy was done 1/9 per IR.  Prelim pathology is malignant, but not much further clarification is yet available.   Probably carcinoma based on IHC.  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   She needed one unit PRBCs. Venofer x 3 has completed earlier this week.  Tachycardia, SOB.   CTA negative for PE  Pain controll--on PCA.  Palliative care following     Plan:  Await tissue diagnosis.  We noted that we should have a preliminary result from the liver biopsy early this week    2.   Monitor CBC. Received IV iron. Prn transfusion to keep hgb > 7  3.   EGD/Colonoscopy showed no bleeding source and pathology was benign  4.   We discussed that this most likely this represents GYN source but will await confirmation, if needed will proceed with first cycle of chemotherapy as inpatient  5.   Brain MRI to be completed today    Kendell Mcarthur MD

## 2024-01-15 NOTE — CM/SW NOTE
SW noted that patient is being recommended home health services at DC by PT and OT. Referral was intiatied by weekend CM. SW expanded referral in [place] and will follow up with patient once choice list is available.     SW will continue to follow for plan of care changes and remain available for any additional DC needs or concerns.     Lisa Rocha MSW, LSW  Discharge Planner   [id number]

## 2024-01-15 NOTE — PROGRESS NOTES
[facility]    Progress Note     Alysia Campos Patient Status:  Inpatient    7/15/1970 MRN [id number]   Location [facility] 4NW-A Attending Harshal Aponte MD   Hosp Day # 7 PCP Renetta Santos DO     Follow up for: The primary encounter diagnosis was Metastatic malignant neoplasm, unspecified site (HCC). Diagnoses of Hyponatremia, Leukocytosis, unspecified type, and Anemia, unspecified type were also pertinent to this visit.      Interval History/Subjective:     Pt seen and examined.  She c/o 10/10 pain.  She understands that the MRI brain is schedule for later today, wants to ensure that she is sedated.  No F/C, N/V.      Vital signs:  Temp:  [98.6 °F (37 °C)] 98.6 °F (37 °C)  Pulse:  [113] 113  BP: (125)/(62) 125/62  SpO2:  [96 %] 96 %    Physical Exam:      General: No acute distress. Anxious and uncomfortable, nontoxic Appears pale  HEENT: Normocephalic atraumatic. Moist mucous membranes; Sclera anicteric. EOMI  Neck: Supple, +JVD  Respiratory: Diminished at bilateral bases, otherwise CTA; ;reg resp rate & effort, no wheezes/crackles   Cardiovascular: S1, S2. Tachycardic. No murmurs appreciable   Chest and Back: No tenderness or deformity.  Abdomen: distended with generalized TTP; no guarding/rebound tenderness; +hepatomegaly  Neurologic: Drowsy but arousable to voice; No focal neurological deficits. CNII-XII grossly intact.  Musculoskeletal: Moves all extremities.  Extremities: No edema or cyanosis.  Integument: No rashes or lesions.   Psychiatric: Appropriate mood and affect.       Diagnostic Data:      Labs:  Recent Labs   Lab 24  0722 24  1726 01/10/24  0712 01/10/24  1611 24  0704 24  0626 24  0624 24  0702 01/15/24  0704   WBC 30.8*  --  35.3*  --  38.7* 37.5* 37.0* 32.2* 29.6*   HGB 6.1*   < > 6.6*   < > 7.5* 7.5* 7.9* 7.6* 7.6*   MCV 67.6*  --  71.3*  --  70.8* 72.6* 73.7* 76.6* 75.9*   .0  --  343.0  --  347.0 333.0 345.0 343.0 329.0   BAND 2  --  1   --   --  9 1 6 8   INR 1.07  --   --   --   --   --   --   --   --     < > = values in this interval not displayed.       Recent Labs   Lab 01/11/24  0704 01/11/24  1622 01/14/24  0702 01/14/24  1800 01/15/24  0704   *   < > 95 104* 79   BUN 14   < > 16 18 23   CREATSERUM 0.57   < > 0.62 0.58 0.80   CA 9.3   < > 9.7 10.0 10.1   ALB 3.1*  --   --   --   --    *   < > 139 140 140   K 3.6   < > 3.6 3.7 3.7   CL 99   < > 112 111 110   CO2 21.0   < > 27.0 26.0 22.0   ALKPHO 211*  --   --   --   --    AST 96*  --   --   --   --    ALT 39  --   --   --   --    BILT 0.9  --   --   --   --    TP 7.0  --   --   --   --     < > = values in this interval not displayed.       Recent Labs   Lab 01/09/24  0722   PTP 13.9   INR 1.07       No results for input(s): \"TROP\", \"CK\" in the last 168 hours.         Imaging: Imaging data reviewed in Epic.    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      Abdominal Pain / Distention  -- likely secondary to massive hepatomegaly noted on CT   --  KUB normal   -- US Abd with only trace ascites    Urinary Retention  -- BS ~217mL overnight, pt endorses stress incontinence & urinary hesitancy/incomplete voiding  --  consult per Onc -> Flomax trial     Hypoxia, tachycardia -- stable   - BNP elevated may be reflective of volume overload given her persistent IV fluids and recent transfusions  - CT PE negative for PE but with extensive mediastinal and pulmonary tumor burden as the likely cultprit  -- Cont supplemental O2, wean as tolerated     Ovarian Mass  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   -- CT Abd w/ multiple pulmonary, hepatic metastases, omental mass, 7.8cm left ovarian mass  -- CT Chest performed 1/8 w/ innumerable pulmonary nodules  w/ large confluent masses at bilateral bases  -- Gyn Onc consulted, plan discussed  -Oncology consult, recommendations reviewed, pending results of biopsy  - US pelvis unremarkable   -Status post US guided biopsy of liver 1/9/2024, pathology pending  -Continue Norco, Dilaudid as needed for pain  -1/10: Based on patient's tenuous clinical status, may require first round of chemotherapy as inpatient per oncology, pending initial pathology results  - MRI brain ordered for today, pt requests to be sedated     Hyponatremia-resolved   Possible SIADH   -- likely hypovolemic in the setting of poor PO intake, nausea/vomiting  -- Na 126 on admission, s/p 1L NS bolus  - Sodium correcting appropriately, change BMP to BID  --resolved s/p tolvaptan x1 1/12, change mIVF to TKO      Acute on Chronic Anemia  -- stable  -- recent baseline Hgb 7.7-8.0, 7.0 on admission  -- no e/o active bleeding or hemodynamic instability  -- 1 unit PRBC given 1/9 with minimal response, additional unit ordered today  -- Iron studies consistent with deficiency, Venofer ordered  - CTM, transfuse to maintain goal hemoglobin >7.0  - GI consulted due to persistent anemia, elevated CEA levels  -- EGD/Colonoscopy w/o active bleeding or clear e/o  malignancy, biopsies taken, path pending; no further interventions per GI   -- 1/14: Hgb now stable >48 hours, resume lovenox for dvt prophylaxis     DVT Mechanical Prophylaxis:   SCDs,    DVT Pharmacologic Prophylaxis   Medication    enoxaparin (Lovenox) 40 MG/0.4ML SUBQ injection 40 mg              Code Status: FULL     Dispo: inpatient; EVA >2 midnights    Plan of care discussed with patient and/or family at bedside.    Message sent to Dr. Ngo of palliative care.      Greater than 51 minutes spent on care of this complex pt, more than 50% face to face time.      Addendum:    I was paged by the RN in PACU concerning hypoxia with SOB and increased WOB post MRI with anesthesia.  I evaluated the patient, she c/o feeling SOB, very anxious on exam.  Breath sounds diminished at the bases.  Tachy, reg, no MRG.   0.5 mg IV versed ordered, STAT CXR, and stat ABG ordered.   Pulm c/s, d/w Dr. Spaulding, transfer to ICU for closer monitoring.  POC d/w pt's daughter in law Cintia.  She is full code.      An additional 61 minutes of prolonged care delivered.  25 minutes of extensive chart review, 36 minutes in discussion with specialists and pt's family.      Maikel Valera [facility]   [phone]      This note was created using voice recognition technology. It may include inadvertent transcriptional errors. Any such errors should be contextually interpreted and should not be taken to alter the content or the meaning.     Note to Patient: The 21st Century Cures Act makes medical notes like these available to patients in the interest of transparency. However, be advised this is a medical document. It is intended as peer to peer communication. It is written in medical language and may contain abbreviations or verbiage that are unfamiliar. It may appear blunt or direct. Medical documents are intended to carry relevant information, facts as evident, and the clinical opinion of the practitioner and not intended to be  the primary source of your information.  Please refer directly to myself or clinical staff for information regarding plan of care.

## 2024-01-15 NOTE — PAYOR COMM NOTE
--------------  CONTINUED STAY REVIEW    Payor: [employer] OPEN ACCESS   Subscriber #:  [id number]  Authorization Number: [id number]    Admit date: 1/8/24  Admit time:  2:50 PM    1/13     Metastatic malignant neoplasm,    Diagnoses of Hyponatremia, Leukocytosis, unspecified type, and Anemia,       Unable to tolerate MRI   DEBI overnight  Afebrile, HDS  Labs stable     Abdominal pain worsening, only mild relief from prn PCA dose.   Abdomen more distended,    Waiting for pathology results    APPEARS PALE    Respiratory: Diminished at bilateral bases, otherwise CTA; ;reg resp rate & effort, no wheezes/crackles   Cardiovascular: S1, S2. Tachycardic. 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   Hyponatremia  Acute on Chronic Anemia      Plan     Diplopia  --Given evidence of widely metastatic disease will need to check MRI brain to rule out metastases, pending  -- could not tolerate MRI Brain, will need done with anesthesia, tentatively Monday 1/15      Abdominal Pain / Distention  -- likely secondary to massive hepatomegaly noted on CT   -- check KUB / Abd US      Urinary Retention  -- BS ~217mL overnight, pt endorses stress incontinence & urinary hesitancy/incomplete voiding  --  consult per Onc -> Flomax trial      Hypoxia, tachycardia -- stable   - BNP elevated may be reflective of volume overload given her persistent IV fluids and recent transfusions  - CT PE negative for PE but with extensive mediastinal and pulmonary tumor burden as the likely cultprit  -- Cont supplemental O2, wean as tolerated      Ovarian Mass  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   -- CT Abd w/ multiple pulmonary, hepatic metastases, omental mass, 7.8cm left ovarian mass  -- CT Chest performed 1/8 w/ innumerable pulmonary nodules  w/ large confluent masses at bilateral bases  --Status post US guided biopsy of liver 1/9/2024, pathology  pending  -Continue Norco, Dilaudid as needed for pain  -1/10: Based on patient's tenuous clinical status, may require first round of chemotherapy as inpatient per oncology, pending initial pathology results    Hyponatremia-improving  Possible SIADH   -- likely hypovolemic in the setting of poor PO intake, nausea/vomiting  -- Na 126 on admission, s/p 1L NS bolus  -- Correcting appropriately today, continue normal saline at 150 cc/h     Acute on Chronic Anemia   -- recent baseline Hgb 7.7-8.0, 7.0 on admission  --- 1 unit PRBC given 1/9 with minimal response, additional unit ordered today  -- Iron studies consistent with deficiency, Venofer ordered  -  - GI consulted due to persistent anemia, elevated CEA levels, no acute interventions for now    ONCOLOGY  STILL WITH PAIN  ON PCA FOR PAIN CONTROL    WBC 37 HGB 7.9     Impression  Metastatic malignancy based on imaging, unclear primary lesion.   Widely metastatic with lymphangitic spread in lungs, liver metastases, pelvic metastases, ovarian mass.  Liver biopsy was done 1/9 per IR.  Prelim pathology is malignant, but not much further clarification is yet available.   Probably carcinoma based on IHC.  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   She needed one unit PRBCs. Venofer x 3 has completed earlier this week.  Tachycardia, SOB.   CTA negative for PE  Pain controll--on PCA.      Plan:  Await tissue diagnosis.  We noted results may be not be available until early next week. Has been sent for cosultation   2.   Monitor CBC. Received IV iron. Prn transfusion to keep hg >7 hm  3.    S/p  Endoscopic evaluation--negative for malignancy or bleeding source.  4.    Patient on PCA.   5.  widely/extensively metastatic disease. Clinically concerning for GYN malignancy, but path is not conclusive--awaiting consultation       1/14  WBC 32  HGB 7.6      4L 02  IVF  PCA    PLAN MRI BRAIN WITH SEDATION TODAY    MEDICATIONS  ADMINISTERED IN LAST 1 DAY:      busPIRone (Buspar) tab 5 mg       Date Action Dose Route User    1/14/2024 2129 Given 5 mg Oral Jerri Rojas RN    1/14/2024 0956 Given 5 mg Oral Tereso Vang RN          enoxaparin (Lovenox) 40 MG/0.4ML SUBQ injection 40 mg       Date Action Dose Route User    1/14/2024 2003 Given 40 mg Subcutaneous (Right Upper Arm) Tereso Vang RN          guaiFENesin ER (Mucinex) 12 hr tab 600 mg       Date Action Dose Route User    1/14/2024 2129 Given 600 mg Oral Jerri Rojas RN    1/14/2024 0954 Given 600 mg Oral Tereso Vang RN          HYDROmorphone in sodium chloride 0.9% (Dilaudid) 20 mg/100mL PCA premix       Date Action Dose Route User    1/15/2024 0347 New Bag 20 mg Intravenous Jerri Rojas RN    1/14/2024 1145 Hi-Risk Rate/Dose Change (none) Intravenous Tereso Vang RN          hydrOXYzine (Atarax) tab 25 mg       Date Action Dose Route User    1/14/2024 1903 Given 25 mg Oral Tereso Vang RN          ketorolac (Toradol) 15 MG/ML injection 15 mg       Date Action Dose Route User    1/14/2024 1958 Given 15 mg Intravenous Tereso Vang RN               sodium chloride 0.9% infusion       Date Action Dose Route User    1/14/2024 1708 Rate/Dose Change (none) Intravenous Tereso Vang RN            Vitals (last day)       Date/Time Temp Pulse Resp BP SpO2 Weight O2 Device O2 Flow Rate (L/min) [facility]    01/15/24 0349 98.6 °F (37 °C) 113 -- 125/62 96 % -- -- -- TH    01/14/24 1212 97.8 °F (36.6 °C) 120 22 139/68 100 % -- Nasal cannula 4 L/min EM    01/14/24 0700 -- -- -- -- 94 % -- Nasal cannula 4 L/min EO    01/14/24 0329 97.3 °F (36.3 °C) 121 20 135/99 97 % -- Nasal cannula 4 L/min MACIE    01/14/24 0249 -- 121 -- -- 100 % -- -- -- MACIE          CIWA Scores (since admission)       None          Blood Transfusion Record       Product Unit Status Volume Start End            Transfuse RBC       23  [id number]  [id number] Completed 01/10/24 1317 400 mL  01/10/24 1038 01/10/24 1317       23  [id number]  [id number] Completed 01/12/24 0751 358.33 mL 01/09/24 0830 01/12/24 0745                Procedures:      Plan:

## 2024-01-15 NOTE — DIETARY NOTE
[facility]    NUTRITION ASSESSMENT    Unable to diagnose malnutrition criteria at this time.    NUTRITION INTERVENTION:    RD nutrition Care Plan- See RD nutrition assessment for additional recommendations  Meal and Snacks - Monitor and encourage adequate PO intake.  Medical Food Supplements - Will evaluate need when diet is advanced. Rationale/use for oral supplements discussed.  Vitamin and Mineral Supplements - adding Chewable MVI  Pt with minimal to no PO intake x7 days, may consider nutrition support if unable to advance diet within next 24-36hr if within GOC.      PATIENT STATUS: 1/15:  Attempted to speak with pt. Pt with Care team at time of visit. Plan for sedated brain MRI today. Pt with minimal to no PO intake x7 days, may consider nutrition support if unable to advance diet within next 24-36hr. No new weights to review, will request updated weight as able.     01/10/24 54yo female admit d/t cough, progressive fatigue/CAREY and N/V/D. CT displayed innumerable metastatic lesions in liver and lungs, along with omental mass in RLQ and left ovarian mass. Pt underwent liver biopsy in IR 1/9/24. Heme/Onc consulted, recommending pt stay for 1st cycle chemotherapy inpatient. Pt anemic, received PRBC this morning.   Attempted to speak with pt at bedside, pt unavailable to obtain history. Will attempt to speak with pt at a later date.     PMH: Anemia      ANTHROPOMETRICS:  Ht: 157.5 cm (5' 2\")  Wt: 74.8 kg (165 lb).   BMI: Body mass index is 30.18 kg/m².  IBW: 50 kg      WEIGHT HISTORY:   Weight loss: [facility]    Wt Readings from Last 10 Encounters:   01/08/24 74.8 kg (165 lb)   01/12/24 85.2 kg (187 lb 12.8 oz)        NUTRITION:  Diet:       Procedures    NPO      Food Allergies: No  Cultural/Ethnic/Christianity Preferences Addressed: Yes    Percent Meals Eaten (last 3 days)       Date/Time Percent Meals Eaten (%)    01/12/24 0900 50 %    01/14/24 2130 0 %    01/15/24 1019 0 %     Percent Meals Eaten (%): npo at  01/15/24 1019            GI system review: constipation and abdominal pain Last BM: 1/11  Skin and wounds: WNL    NUTRITION RELATED PHYSICAL FINDINGS:     1. Body Fat/Muscle Mass:  JAVAN, pt unavailable for visit, will obtain at follow up     2. Fluid Accumulation: none per RN documentation     NUTRITION PRESCRIPTION: 50kg - IBW  Calories: 2437-8754 calories/day (35-40 kcal/kg)  Protein:  grams protein/day (1.5-2.0 grams protein per kg)  Fluid: ~1 ml/kcal or per MD discretion    NUTRITION DIAGNOSIS/PROBLEM:  Inadequate oral intake related to decreased intake as evidenced by documented/reported insufficient oral intake      MONITOR AND EVALUATE/NUTRITION GOALS:  PO intake of 75% of meals TID - Not met, Continues  Weight stable within 1 to 2 lbs during admission - Ongoing  Return to PO intake or advance diet in 24-48 hrs - New  Start alternative nutrition in 24-48 hrs if diet is not able to advance- New      MEDICATIONS:  MVI, Senokot    LABS:  Reviewed    Pt is at High nutrition risk      Kyler Rutherford, MS, RDN, LDN  Clinical Dietitian  [id number]

## 2024-01-15 NOTE — PROGRESS NOTES
[facility]  Palliative Care Progress Note    Alysia Campos Patient Status:  Inpatient    7/15/1970 MRN [id number]   [facility] ED 4NW-A Attending Harshal Aponte MD   Hosp Day # 7 PCP Renetta Santos DO     History/Other:       Alysia Campos is a 53 year old female with migraines who was admitted on 2024 for cough, N/V/D. Work up in our hospital revealed CT scan with lung and liver nodules, an omental mass, and a left ovarian 8 cm mass and labs significant for iron deficiency anemia (Hgb 7.0), leukocytosis, elevated AST/Alk phos, hyponatremia (Na 124), and elevated tumor markers //CEA. Liver bx on  with documented prelim results per oncology that is suggestive of malignancy. Anemia s/p 2 units pRBCs and venofer x3 days.      Consult ordered by:: Dr. Mckeon for evaluation of Palliative Care needs and Uncontrolled symptoms.    Allergies:  Allergies   Allergen Reactions    Mold TINITUS     Medications:     Current Facility-Administered Medications:     HYDROmorphone in sodium chloride 0.9% (Dilaudid) 20 mg/100mL PCA premix, , Intravenous, Continuous    QUEtiapine (SEROquel) tab 25 mg, 25 mg, Oral, Nightly    enoxaparin (Lovenox) 40 MG/0.4ML SUBQ injection 40 mg, 40 mg, Subcutaneous, Q24H    ketorolac (Toradol) 15 MG/ML injection 15 mg, 15 mg, Intravenous, Q6H PRN    tamsulosin (Flomax) cap 0.4 mg, 0.4 mg, Oral, Nightly    busPIRone (Buspar) tab 5 mg, 5 mg, Oral, BID    sennosides (Senokot) tab 8.6 mg, 8.6 mg, Oral, BID    benzonatate (Tessalon) cap 100 mg, 100 mg, Oral, Q8H    sodium chloride 0.9% infusion, , Intravenous, Continuous    HYDROmorphone 0.4 mg BOLUS FROM PCA, 0.4 mg, Intravenous, Q30 Min PRN    naloxone (Narcan) 0.4 MG/ML injection 0.08 mg, 0.08 mg, Intravenous, Q5 Min PRN    acetaminophen (Tylenol) tab 650 mg, 650 mg, Oral, Q8H    hydrOXYzine (Atarax) tab 25 mg, 25 mg, Oral, TID PRN    guaiFENesin ER (Mucinex) 12 hr tab 600 mg, 600 mg, Oral, BID    multivitamin (Centrum)  chewable tab (Adult) 1 tablet, 1 tablet, Oral, Daily    ipratropium-albuterol (Duoneb) 0.5-2.5 (3) MG/3ML inhalation solution 3 mL, 3 mL, Nebulization, Q6H PRN    dextromethorphan-guaiFENesin (Robitussin-DM)  mg/5mL oral solution 5 mL, 5 mL, Oral, Q6H PRN    ondansetron (Zofran) 4 MG/2ML injection 4 mg, 4 mg, Intravenous, Q6H PRN    prochlorperazine (Compazine) 10 MG/2ML injection 5 mg, 5 mg, Intravenous, Q8H PRN    Subjective      Interval events:  over the weekend, hospitalist increased PCA continuous to 0.4mg/H. Possible visual hallucinations; Seroquel and buspar was added. RN states that pt tried to urinate this AM and only had a few drops that appeared bloody.      When I entered the room, the patient was awake, alert, and sitting up in bed. Daughter present at bedside.   Pt able to answer questions, but was lethargic and had difficulty staying on task. She states that she is unable to push the PCA button now, but cannot explain why.   Family and pt upset that MRI was moved and she was kept NPO. Pt states that she can only tolerate a few bites anyway.     Symptom assessment: worsening pain, abdominal distension. Family noted hallucinations over the weekend, nothing today.    Pain assessment:   24 hour OME total: total 13.81mg  (continuous 8.61mg, pt admin 5.2mg, CB 0mg)   8 hour use: total 3.13mg  (continuous 2.73mg, pt admin 0.4mg, CB 0mg)   Current pain: severe 10/10, located in the LLQ and throughout the lower abdomen, made worse by movement and coughing, alleviated minimally by pain meds and repositioning     See summary of discussion below.   Of note, during visit, pt had a difficult time swallowing pills. She had trouble coordinating swallow and would throw her head back and then cough.     Review of Systems:  Pertinent items are noted in subjective.  Bowel Movement         1/11/2024  1837             Stool Count Calculated for I/O: 1            Objective:     Vital Signs:  Blood pressure 125/62,  pulse 113, temperature 98.6 °F (37 °C), temperature source Oral, resp. rate 22, height 5' 2\" (1.575 m), weight 165 lb (74.8 kg), SpO2 96%.  Body mass index is 30.18 kg/m².    Present Level of pain: severe 10/10  Non-verbal signs of pain present: Yes  Current Palliative performance scale PPSv2 (%): 50    Physical Exam:  General: Lethargic. In no apparent respiratory distress.    HEENT: AT/NC. No gross focal deficits.  Lungs:  cough  on NC  Abdomen: bowel sounds present. Distended. TTP over lower abdomen. Pt was sitting up during exam, which is sub optimal.   Extremities: LE edema present. Stockings present   Neurologic: Aox3, lethargic. Moments when she is unable to hold her head upright or coordinate hand movements.   Psychiatric: Mood anxious, mood labile, pleasant   Skin: pale         Results:     Hematology:  Lab Results   Component Value Date    WBC 29.6 (H) 01/15/2024    HGB 7.6 (L) 01/15/2024    HCT 25.8 (L) 01/15/2024    .0 01/15/2024     Coags:  Lab Results   Component Value Date    INR 1.07 01/09/2024    PTT 28.4 01/09/2024     Chemistry:  Lab Results   Component Value Date    CREATSERUM 0.80 01/15/2024    BUN 23 01/15/2024     01/15/2024    K 3.7 01/15/2024     01/15/2024    CO2 22.0 01/15/2024    GLU 79 01/15/2024    CA 10.1 01/15/2024    ALB 3.1 (L) 01/11/2024    ALKPHO 211 (H) 01/11/2024    BILT 0.9 01/11/2024    TP 7.0 01/11/2024    AST 96 (H) 01/11/2024    ALT 39 01/11/2024     Imaging:  US ABDOMEN LIMITED (CPT=76705)    Result Date: 1/14/2024  CONCLUSION:  Limited ultrasound of the abdomen was performed evaluating all 4 quadrants of the abdomen and pelvis for ascites.   There is trace ascites identified adjacent to the liver.  No large pocket of ascites demonstrated.   LOCATION:  [id number]   Dictated by (CST): Sherita Whitman MD on 1/14/2024 at 1:31 PM     Finalized by (CST): Sherita Whitman MD on 1/14/2024 at 1:32 PM       XR ABDOMEN (1 VIEW) (CPT=74018)    Result Date:  1/13/2024  CONCLUSION:  No acute disease.    LOCATION:  [facility]   Dictated by (CST): Nomi Dawson MD on 1/13/2024 at 6:58 PM     Finalized by (CST): Nomi Dawson MD on 1/13/2024 at 6:59 PM           Summary of Discussion     Spoke with pt and Carmen in the room. Focused on pain management. Carmen had questions regarding bx results and oncology.   Discussed with pt and Carmen use of PCA and reeducated on how to use appropriately. Requested that family or RN page me if pt appears more sedated.     Advance Care Planning counseling and discussion:   HC POA Documentation Completed--Document signed and will be scanned.   Arya Madden.   POLST FORM Does not wish to complete at this time  No Order - pt and family understand this would default to full code. Pt not willing to discuss further at this time     Assessment and Recommendations       Principal Problem:    Metastatic malignant neoplasm, unspecified site (HCC)  Active Problems:    Hypokalemia    Hyponatremia    Leukocytosis, unspecified type    Anemia, unspecified type    Cancer related pain    Palliative care by specialist    Goals of care, counseling/discussion    Goals of care:  ongoing as pt remains in the information gathering stage    Awaiting further information and bx results    Plan for MRI today w/ sedation   Code Status: No Order   POA is arya Madden.      Symptoms  #cancer related pain  -tylenol scheduled   - IV dilaudid PCA    - continuous 0.4mg     -pt admin 0.2mg Q10 minute lock out with 2mg max per hour (altered over the weekend by hospitalist)    -clinician bolus 0.4mg Q30 minutes over 4 hours for severe pain   -Abd US 1/14 w/o pocket of ascites     #Anxiety   -buspar 5mg BID started on 1/13    #Constipation prevention  -LBM 1/11 per I&Os  -senna BID  -KUB 1/13 w/o significant stool burden     #nausea   -PRN zofran or compazine     Discussed today's visit with Family, RN, and Hospitalist.    Palliative Care Follow Up: Palliative care team will Continue to  follow while inpatient.  Palliative care follow up outpatient: is indicated but not currently enrolled in palliative care program.    Thank you for allowing Palliative Care services to participate in the care of Alysia Campos.    A total of  55  minutes were spent on this consult, which included all of the following: chart review, direct face to face contact, history taking, physical examination, counseling and coordinating care, and documentation.     Jessie Ngo MD  1/15/2024  10:28 AM   Palliative Care Services    The 21st Century Cures Act makes medical notes like these available to patients in the interest of transparency. Please be advised this is a medical document. Medical documents are intended to carry relevant information, facts as evident, and the clinical opinion of the practitioner. The medical note is intended as peer to peer communication and may appear blunt or direct. It is written in medical language and may contain abbreviations or verbiage that are unfamiliar.      Addendum: revisited around 1:20 PM. RN had recently given bolus. Total PCA use over the past 4 hours as follows: total 2.99mg  (continuous 1.59mg, pt admin 1mg, CB 0.4mg) ~0.75mg/H, which has increased compared to the past 24 hour use (~0.58mg/H). Pain is currently 9/10. Pt is very lethargic but answering most questions. She is having a difficult time describing her pain and symptoms. For instance, she denies SOB but endorses wheezing and states that \"oxygen is not working.\" Requested RN to have PT give PRN neb treatment. Discussed with daughter, son-in-law and daughter-in-law regarding pain, sedation, and anxiety. When medical staff walks in, pt becomes anxious and pain increases. When family is present, she is more alert and works through the pain so that she can be present and interact with them, which leads to uncontrolled pain and fatigue. Given level of sedation, hesitant to increase PCA settings at this time. Daughter and DAYANA  are both paramedics. We discussed rotation to morphine as well, but may be more sedating. An additional 25 minutes spent with pt at bedside and RN outside of the room. Total encounter time 80 minutes.   Jessie Ngo MD, 01/15/24, 2:06 PM

## 2024-01-15 NOTE — OCCUPATIONAL THERAPY NOTE
Attempted to see pt for OT eval. Couple family members present in room. Discussed with pt/family role and goal of occupational therapy services in hospital. Per family member pt has been up to commode with assist from family and staff. Pt with incr pain levels, just had pushed PCA pump button prior to entering room. Plans for MRI brain with sedation later today. Will follow up as schedule allows and as pt remains medically appropriate. RN aware.

## 2024-01-16 NOTE — PROGRESS NOTES
[facility]  Urology Progress Note    Alysia Campos Patient Status:  Inpatient    7/15/1970 MRN [id number]   Location [facility] 4SW-A Attending Maikel Valera DO   Hosp Day # 8 PCP Renetta Santos DO     Subjective:  Alysia Campos is a(n) 53 year old female.    Had MRI with anesthesia -  required oxygen along with being short of breath in PACU.  A CXR showed increase in bilateral interstitial and nodular opacities consistent with metastases, but could not exclude infectious/ edematous process.   Patient transferred to ICU for closer monitoring.      Current complaints: Hunter catheter has been placed this afternoon - 400 drained from bladder per nurse.        Objective:  General appearance: no distress  /62 (BP Location: Right arm)   Pulse 113   Temp 98 °F (36.7 °C) (Temporal)   Resp (!) 28   Ht 5' 2\" (1.575 m)   Wt 184 lb 4.9 oz (83.6 kg)   LMP  (LMP Unknown)   SpO2 95%   BMI 33.71 kg/m²   NGT in place  : hunter catheter in place draining cloudy yellow urine     UOP - 1350 mL/24 hours    Lab Results   Component Value Date    WBC 32.2 2024    HGB 7.7 2024    HCT 25.4 2024    .0 2024    CREATSERUM 0.71 2024    BUN 26 2024     2024    K 3.8 2024     2024    CO2 25.0 2024    GLU 96 2024    CA 9.9 2024    ALB 3.0 01/15/2024    ALB 3.0 01/15/2024    ALKPHO 211 01/15/2024    ALKPHO 211 01/15/2024    BILT 1.0 01/15/2024    BILT 1.0 01/15/2024    TP 6.9 01/15/2024    TP 6.9 01/15/2024     01/15/2024     01/15/2024    ALT 48 01/15/2024    ALT 48 01/15/2024    MG 2.7 01/15/2024    PHOS 3.2 2024    PGLU 102 2024         XR CHEST AP PORTABLE  (CPT=71045)    Result Date: 2024  CONCLUSION:  No significant interval change.   LOCATION:  [id number]      Dictated by (CST): Silviano Motta MD on 2024 at 6:47 AM     Finalized by (CST): Silviano Motta MD on 2024 at 6:48 AM        XR CHEST AP PORTABLE  (CPT=71045)    Result Date: 1/15/2024  CONCLUSION:  1. When compared to most recent chest radiograph dated 1/8/2024, increasing bilateral interstitial and nodular opacities consistent with metastases.  Superimpose infectious/edematous process cannot be excluded.   LOCATION:  [facility]      Dictated by (CST): Deyanira Whipple MD on 1/15/2024 at 6:56 PM     Finalized by (CST): Deyanira Whipple MD on 1/15/2024 at 7:06 PM       MRI BRAIN (W+WO) (CPT=70553)    Result Date: 1/15/2024  CONCLUSION:  1. No abnormal enhancement to suggest metastases.   LOCATION:  [facility]    Dictated by (CST): Deyanira Whipple MD on 1/15/2024 at 5:40 PM     Finalized by (CST): Deyanira Whipple MD on 1/15/2024 at 5:44 PM       US ABDOMEN LIMITED (CPT=76705)    Result Date: 1/14/2024  CONCLUSION:  Limited ultrasound of the abdomen was performed evaluating all 4 quadrants of the abdomen and pelvis for ascites.   There is trace ascites identified adjacent to the liver.  No large pocket of ascites demonstrated.   LOCATION:  [id number]   Dictated by (CST): Sherita Whitman MD on 1/14/2024 at 1:31 PM     Finalized by (CST): Sherita Whitman MD on 1/14/2024 at 1:32 PM         Assessment:    Sensation of bladder pressure   Incomplete bladder emptying  -hunter catheter placed 1/16/24 - 400 mL drained from bladder per nurse    Plan:    -Dr. Rosen reviewed cystoscopy previously   -On Flomax  -Hunter catheter in place - will continue at this time  -UA collected  -on abx    Above discussed with patient, family, nurse.     Will follow peripherally.     Perla Hugo PA-C  [facility]  Department of Urology  1/16/2024  12:59 PM

## 2024-01-16 NOTE — PROGRESS NOTES
[facility]  Palliative Care Progress Note    Alysia Campos Patient Status:  Inpatient    7/15/1970 MRN [id number]   [facility] 4NW-A Attending Harshal Aponte MD   Hosp Day # 8 PCP Renetta Santos DO     History/Other:       Alysia Campos is a 53 year old female with migraines who was admitted on 2024 for cough, N/V/D. Work up in our hospital revealed CT scan with lung and liver nodules, an omental mass, and a left ovarian 8 cm mass and labs significant for iron deficiency anemia (Hgb 7.0), leukocytosis, elevated AST/Alk phos, hyponatremia (Na 124), and elevated tumor markers //CEA. Liver bx on  with documented prelim results per oncology that is suggestive of malignancy. Anemia s/p 2 units pRBCs and venofer x3 days.      Consult ordered by:: Dr. Mckeon for evaluation of Palliative Care needs and Uncontrolled symptoms.    Allergies:  Allergies   Allergen Reactions    Mold TINITUS     Medications:     Current Facility-Administered Medications:     LORazepam Intensol 2 mg/mL oral concentrated solution 1 mg, 1 mg, Sublingual, Q4H PRN    piperacillin-tazobactam (Zosyn) 3.375 g in dextrose 5% 100 mL IVPB-ADDV, 3.375 g, Intravenous, Q8H    HYDROmorphone in sodium chloride 0.9% (Dilaudid) 20 mg/100mL PCA premix, , Intravenous, Continuous    QUEtiapine (SEROquel) tab 25 mg, 25 mg, Oral, Nightly    enoxaparin (Lovenox) 40 MG/0.4ML SUBQ injection 40 mg, 40 mg, Subcutaneous, Q24H    ketorolac (Toradol) 15 MG/ML injection 15 mg, 15 mg, Intravenous, Q6H PRN    tamsulosin (Flomax) cap 0.4 mg, 0.4 mg, Oral, Nightly    busPIRone (Buspar) tab 5 mg, 5 mg, Oral, BID    sennosides (Senokot) tab 8.6 mg, 8.6 mg, Oral, BID    benzonatate (Tessalon) cap 100 mg, 100 mg, Oral, Q8H    sodium chloride 0.9% infusion, , Intravenous, Continuous    HYDROmorphone 0.4 mg BOLUS FROM PCA, 0.4 mg, Intravenous, Q30 Min PRN    naloxone (Narcan) 0.4 MG/ML injection 0.08 mg, 0.08 mg, Intravenous, Q5 Min PRN     acetaminophen (Tylenol) tab 650 mg, 650 mg, Oral, Q8H    hydrOXYzine (Atarax) tab 25 mg, 25 mg, Oral, TID PRN    guaiFENesin ER (Mucinex) 12 hr tab 600 mg, 600 mg, Oral, BID    multivitamin (Centrum) chewable tab (Adult) 1 tablet, 1 tablet, Oral, Daily    ipratropium-albuterol (Duoneb) 0.5-2.5 (3) MG/3ML inhalation solution 3 mL, 3 mL, Nebulization, Q6H PRN    dextromethorphan-guaiFENesin (Robitussin-DM)  mg/5mL oral solution 5 mL, 5 mL, Oral, Q6H PRN    ondansetron (Zofran) 4 MG/2ML injection 4 mg, 4 mg, Intravenous, Q6H PRN    prochlorperazine (Compazine) 10 MG/2ML injection 5 mg, 5 mg, Intravenous, Q8H PRN    Subjective      Interval events:  MRI with anesthesia yesterday; negative for brain mets. Increased O2 needs and fluid overload. ICU transfer.      When I entered the room, the patient was sleeping but easy to wake. Daughter present at bedside.   Pt lethargic. Family states that she is having pain and difficulty pushing PCA button.     Symptom assessment: worsening pain, abdominal distension.   Pain assessment:   24 hour OME total: total 11.84mg  (continuous 7.84mg, pt admin 3.6mg, CB 0.4mg)   4 hour use: total 1.8mg  (continuous 1.6mg, pt admin 0.2mg, CB 0mg)   Current pain: severe     See summary of discussion below.     Review of Systems:  Pertinent items are noted in subjective.  Bowel Movement         1/11/2024  1837             Stool Count Calculated for I/O: 1          Objective:     Vital Signs:  Blood pressure 95/52, pulse 103, temperature 98.1 °F (36.7 °C), temperature source Temporal, resp. rate 13, height 5' 2\" (1.575 m), weight 184 lb 4.9 oz (83.6 kg), SpO2 92%.  Body mass index is 33.71 kg/m².  Present Level of pain: severe 10/10  Non-verbal signs of pain present: Yes  Current Palliative performance scale PPSv2 (%): 50    Physical Exam:  General: Lethargic. In no apparent respiratory distress.    HEENT: AT/NC. No gross focal deficits.  Lungs:  cough  on NC  Abdomen: distended. Exam  deferred   Extremities: deferred   Neurologic: Aox3, lethargic.    Psychiatric: Mood anxious, mood labile   Skin: pale   Pt then placed on bipap and asked us to leave.         Results:     Hematology:  Lab Results   Component Value Date    WBC 32.2 (H) 01/16/2024    HGB 7.7 (L) 01/16/2024    HCT 25.4 (L) 01/16/2024    .0 01/16/2024     Coags:  Lab Results   Component Value Date    INR 1.07 01/09/2024    PTT 28.4 01/09/2024     Chemistry:  Lab Results   Component Value Date    CREATSERUM 0.71 01/16/2024    BUN 26 (H) 01/16/2024     01/16/2024    K 3.8 01/16/2024     01/16/2024    CO2 25.0 01/16/2024    GLU 96 01/16/2024    CA 9.9 01/16/2024    ALB 2.9 (L) 01/16/2024    ALKPHO 193 (H) 01/16/2024    BILT 1.0 01/16/2024    TP 6.6 01/16/2024     (H) 01/16/2024    ALT 46 01/16/2024    MG 2.7 (H) 01/15/2024    PHOS 3.2 01/16/2024     Imaging:  XR CHEST AP PORTABLE  (CPT=71045)    Result Date: 1/16/2024  CONCLUSION:  No significant interval change.   LOCATION:  [facility]      Dictated by (CST): Silviano Motta MD on 1/16/2024 at 6:47 AM     Finalized by (CST): Silviano Motta MD on 1/16/2024 at 6:48 AM       XR CHEST AP PORTABLE  (CPT=71045)    Result Date: 1/15/2024  CONCLUSION:  1. When compared to most recent chest radiograph dated 1/8/2024, increasing bilateral interstitial and nodular opacities consistent with metastases.  Superimpose infectious/edematous process cannot be excluded.   LOCATION:  [facility]      Dictated by (CST): Dyeanira Whipple MD on 1/15/2024 at 6:56 PM     Finalized by (CST): Deyanira Whipple MD on 1/15/2024 at 7:06 PM       MRI BRAIN (W+WO) (CPT=70553)    Result Date: 1/15/2024  CONCLUSION:  1. No abnormal enhancement to suggest metastases.   LOCATION:  [facility]    Dictated by (CST): Deyanira Whipple MD on 1/15/2024 at 5:40 PM     Finalized by (CST): Deyanira Whipple MD on 1/15/2024 at 5:44 PM       US ABDOMEN LIMITED (CPT=76705)    Result Date: 1/14/2024  CONCLUSION:  Limited ultrasound of the abdomen was  performed evaluating all 4 quadrants of the abdomen and pelvis for ascites.   There is trace ascites identified adjacent to the liver.  No large pocket of ascites demonstrated.   LOCATION:  [id number]   Dictated by (CST): Sherita Whitman MD on 1/14/2024 at 1:31 PM     Finalized by (CST): Sherita Whitman MD on 1/14/2024 at 1:32 PM           Summary of Discussion     Spoke with Carmen outside of the room. Provided support. Encouraged discussions with her mom and brother regarding clinical decline while in the hospital. She expressed that they want treatment if offered. Carmen reports that she has been asking her mom for almost a year to go to the doctor to get worked up for these changes (cough, pain, SOB, vaginal bleeding)    Advance Care Planning counseling and discussion:   HC POA Documentation Completed--Document signed and will be scanned.   Patt Madden.   POLST FORM Does not wish to complete at this time  Full Code      Assessment and Recommendations       Principal Problem:    Metastatic malignant neoplasm, unspecified site (HCC)  Active Problems:    Hypokalemia    Hyponatremia    Leukocytosis, unspecified type    Anemia, unspecified type    Cancer related pain    Palliative care by specialist    Goals of care, counseling/discussion    Goals of care:  ongoing as pt remains in the information gathering stage    Awaiting further information and bx results    Family wants tx if offered   Code Status: Full Code   POA is patt Madden.      Symptoms  #cancer related pain  -tylenol scheduled   - IV dilaudid PCA    - continuous 0.4mg     -pt admin 0.2mg Q10 minute lock out with 2mg max per hour    -clinician bolus 0.4mg Q30 minutes over 4 hours for severe pain   -Abd US 1/14 w/o pocket of ascites     #Anxiety   -buspar 5mg BID started on 1/13  -ICU to add precedex in light of bipap needs     #Constipation prevention  -LBM 1/11 per I&Os  -senna BID  -KUB 1/13 w/o significant stool burden     #nausea   -PRN zofran or compazine      Discussed today's visit with Family and ICU APN .    Palliative Care Follow Up: Palliative care team will Continue to follow while inpatient.  Palliative care follow up outpatient: is indicated but not currently enrolled in palliative care program.    Thank you for allowing Palliative Care services to participate in the care of Alysia Campos.    A total of 35 minutes were spent on this consult, which included all of the following: chart review, direct face to face contact, history taking, physical examination, counseling and coordinating care, and documentation.     Jessie Ngo MD  1/16/2024  9:25 AM   Palliative Care Services    The 21st Century Cures Act makes medical notes like these available to patients in the interest of transparency. Please be advised this is a medical document. Medical documents are intended to carry relevant information, facts as evident, and the clinical opinion of the practitioner. The medical note is intended as peer to peer communication and may appear blunt or direct. It is written in medical language and may contain abbreviations or verbiage that are unfamiliar.      Addendum: Revisited at 1:20 PM. Pt removed bipap during visit and tried to remove DHT. Pt states that she cannot breathe with mask on. Pain is severe and not controlled. Per family, she has not been able to utilize pt bolus. Spoke with ICU APN and attending.   Spoke with pt's son extensively.   -Discussed pain management and pt's inability to utilize PCA button. Will continue PCA continuous and nursing bolus. Pt's pain has been inadequately controlled d/t over sedation, no source control, and severe anxiety. Addressed that MSER cannot be crushed and pt unable to tolerate oral meds consistently. Fentanyl TD also discussed, but difficult to titrate on an escalating need. Will add PO dilaudid 2mg Q3H PRN for mod-severe pain. Discussed with RN and POA.   -Addressed code status and POLST form at length. Will remain full  code.   Met with additional family members at bedside. Pt's mother had a double mastectomy and had a maternal aunt with breast cancer as well. Family asking about genetic testing. Message sent to Carine Wu.   IC team and RN updated.   An additional 80 minutes spent with pt and family for a total of 115 minutes, >16 minutes spent on ACP.   Jessie Ngo MD, 01/16/24, 3:09 PM

## 2024-01-16 NOTE — PLAN OF CARE
Assumed care of patient around 2030. Received patient on a dilaudid PCA. 40 mg IV lasix administered per MAR. Weaned from 10 L simple mask to 3 L nasal cannula overnight.

## 2024-01-16 NOTE — SLP NOTE
SLP order received to evaluate oropharyngeal swallow d/t failed RN dysphagia screen. Discussed with RN, patient with AMS and requiring BiPAP support at time of my visit. Plan for DHT placement to initiate tube feeds. SLP will continue to monitor and evaluate as medically appropriate.

## 2024-01-16 NOTE — PROGRESS NOTES
[facility]    Progress Note     Alysia Campos Patient Status:  Inpatient    7/15/1970 MRN [id number]   Location [facility] 4NW-A Attending Harshal Aponte MD   Hosp Day # 8 PCP Renetta Santos DO     Follow up for: The primary encounter diagnosis was Metastatic malignant neoplasm, unspecified site (HCC). Diagnoses of Hyponatremia, Leukocytosis, unspecified type, and Anemia, unspecified type were also pertinent to this visit.      Interval History/Subjective:     Pt seen and examined.  She was transferred to ICU due to respiratory distress and hypoxia after undergoing MRI brain under anesthesia yesterday.  Now down to 6 L/min O2.  Appears more comfortable and less SOB.  Daughter in law at bedside.     Vital signs:  Temp:  [98 °F (36.7 °C)-98.7 °F (37.1 °C)] 98 °F (36.7 °C)  Pulse:  [] 105  Resp:  [13-41] 19  BP: ()/() 117/51  SpO2:  [88 %-100 %] 96 %  FiO2 (%):  [40 %] 40 %    Physical Exam:      General: No acute distress. Anxious but more comfortable, nontoxic Appears pale  HEENT: Normocephalic atraumatic. Moist mucous membranes; Sclera anicteric. EOMI. NGT in place.  Neck: Supple, +JVD  Respiratory: Diminished at bilateral bases, otherwise CTA; ;reg resp rate & effort, no wheezes/crackles   Cardiovascular: S1, S2. Tachycardic. No murmurs appreciable   Chest and Back: No tenderness or deformity.  Abdomen: distended with generalized TTP; no guarding/rebound tenderness; +hepatomegaly  Neurologic: Drowsy but arousable to voice; No focal neurological deficits. CNII-XII grossly intact.  Musculoskeletal: Moves all extremities.  Extremities: +LE edema   Integument: No rashes or lesions.   Psychiatric: Appropriate mood and affect.       Diagnostic Data:      Labs:  Recent Labs   Lab 24  0624 24  0702 01/15/24  0704 01/15/24  2027 01/16/24  0503   WBC 37.0* 32.2* 29.6* 34.2* 32.2*   HGB 7.9* 7.6* 7.6* 8.5* 7.7*   MCV 73.7* 76.6* 75.9* 74.9* 74.7*   .0 343.0 329.0 371.0 304.0    BAND 1 6 8 1 5       Recent Labs   Lab 01/11/24  0704 01/11/24  1622 01/15/24  2027 01/16/24  0503 01/16/24  0931   *   < > 96 96 95   BUN 14   < > 25* 26* 28*   CREATSERUM 0.57   < > 0.65 0.71 0.79   CA 9.3   < > 10.5* 9.9 10.2*   ALB 3.1*  --  3.0*  3.0* 2.9*  --    *   < > 141 140 142   K 3.6   < > 4.1 3.8 3.8   CL 99   < > 111 109 111   CO2 21.0   < > 19.0* 25.0 24.0   ALKPHO 211*  --  211*  211* 193*  --    AST 96*  --  120*  120* 117*  --    ALT 39  --  48  48 46  --    BILT 0.9  --  1.0  1.0 1.0  --    TP 7.0  --  6.9  6.9 6.6  --     < > = values in this interval not displayed.       No results for input(s): \"PTP\", \"INR\" in the last 168 hours.      No results for input(s): \"TROP\", \"CK\" in the last 168 hours.         Imaging: Imaging data reviewed in Epic.    Medications:    lactulose  20 g Oral TID    furosemide  20 mg Intravenous Once    piperacillin-tazobactam  3.375 g Intravenous Q8H    enoxaparin  40 mg Subcutaneous Q24H    tamsulosin  0.4 mg Oral Nightly    busPIRone  5 mg Oral BID    sennosides  8.6 mg Oral BID    benzonatate  100 mg Oral Q8H    acetaminophen  650 mg Oral Q8H    guaiFENesin ER  600 mg Oral BID    multivitamin  1 tablet Oral Daily       ASSESSMENT / PLAN:     Alysia Campos Is a a 53 year old female who presents with symptomatic acute on chronic anemia, hyponatremia and multiple intraabdominal masses concerning for metastic disease     Problem List / Diagnoses     Diplopia   Ovarian Mass  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   Hyponatremia  Acute on Chronic Anemia      Plan    Acute hypoxic respiratory failure   - suspect multifactorial (anesthesia, pulm masses, fluid overload, narcotics)   - CTA chest neg for PE  - s/p IV lasix x 1 on 1/15  - RVP neg  - wean O2 as tolerated     Acute encephalopathy  - suspect due to anesthesia and narcotics  - ABG with pH 7.30, PCO2 49, PO2  102  - ammonia 55  -  alk phose 193  - MRI brain neg for acute  pathology   - consider narcan given high dose opiates on PCA  - consider holding nightly seroquel   - monitor for improvement     Diplopia  -Unclear if secondary to sedation given increased pain requirements  -Given evidence of widely metastatic disease will need to check MRI brain to rule out metastases, pending  -- MRI brain neg for mets.     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   -- CT Abd w/ multiple pulmonary, hepatic metastases, omental mass, 7.8cm left ovarian mass  -- CT Chest performed 1/8 w/ innumerable pulmonary nodules  w/ large confluent masses at bilateral bases  -- Gyn Onc consulted, plan discussed  -Oncology consult, recommendations reviewed, pending results of biopsy  - US pelvis unremarkable   -Status post US guided biopsy of liver 1/9/2024, pathology pending  -Continue Norco, Dilaudid as needed for pain  -1/10: Based on patient's tenuous clinical status, may require first round of chemotherapy as inpatient per oncology, pending initial pathology results  - MRI brain neg for mets    Hyponatremia-resolved   Possible SIADH   -- likely hypovolemic in the setting of poor PO intake, nausea/vomiting  -- Na 126 on admission, s/p 1L NS bolus  - Sodium correcting appropriately, change BMP to BID  --resolved s/p tolvaptan x1 1/12, change mIVF to TKO      Acute on Chronic Anemia  -- stable  -- recent baseline Hgb 7.7-8.0, 7.0 on admission  -- no e/o active bleeding or hemodynamic instability  -- 1 unit PRBC given 1/9 with minimal response, additional unit ordered today  -- Iron studies consistent with deficiency, Venofer ordered  - CTM, transfuse to maintain goal hemoglobin >7.0  -  GI consulted due to persistent anemia, elevated CEA levels  -- EGD/Colonoscopy w/o active bleeding or clear e/o malignancy, biopsies taken, path pending; no further interventions per GI   -- 1/14: Hgb now stable >48 hours, resume lovenox for dvt prophylaxis     LE edema  - pt net + 7.8 L since admission   - stop IVFs  - check BNP and TTE    Leukocytosis   - significant but stable  - procal 0.65  - lactate normalized   - cont empiric zosyn (1/15- )  - f/u Bcx's  - check sputum cx  - check UA    DVT Mechanical Prophylaxis:   SCDs,    DVT Pharmacologic Prophylaxis   Medication    enoxaparin (Lovenox) 40 MG/0.4ML SUBQ injection 40 mg              Code Status: FULL     Dispo: inpatient care in the ICU; EVA >2 midnights    Plan of care discussed with patient and/or family at bedside.    Maikel Valera [facility]   [phone]      This note was created using voice recognition technology. It may include inadvertent transcriptional errors. Any such errors should be contextually interpreted and should not be taken to alter the content or the meaning.     Note to Patient: The 21st Century Cures Act makes medical notes like these available to patients in the interest of transparency. However, be advised this is a medical document. It is intended as peer to peer communication. It is written in medical language and may contain abbreviations or verbiage that are unfamiliar. It may appear blunt or direct. Medical documents are intended to carry relevant information, facts as evident, and the clinical opinion of the practitioner and not intended to be the primary source of your information.  Please refer directly to myself or clinical staff for information regarding plan of care.

## 2024-01-16 NOTE — PROGRESS NOTES
[facility]  Follow up note    Alysia Campos Patient Status:  Inpatient    7/15/1970 MRN [id number]   Location [facility] 4NW-A Attending Harshal Aponte MD   Hosp Day # 8 PCP Renetta Santos, DO     Interval History--  Patient completed MRI Brain with no malignancy noted. She did have increased work of breathing and was moved to ICU currently weaned back down to 3L    History:  Past Medical History:   Diagnosis Date    Anemia     Migraines      Past Surgical History:   Procedure Laterality Date    COLONOSCOPY N/A 2024    Procedure: .;  Surgeon: Ubaldo Morton MD;  Location:  ENDOSCOPY     Family History   Problem Relation Age of Onset    Breast Cancer Mother 60 - 69      reports that she has never smoked. She has never used smokeless tobacco. She reports that she does not currently use alcohol. She reports that she does not use drugs.    Allergies:  Allergies   Allergen Reactions    Mold TINITUS       Medications:    Current Facility-Administered Medications:     LORazepam Intensol 2 mg/mL oral concentrated solution 1 mg, 1 mg, Sublingual, Q4H PRN    sodium chloride 0.9% infusion, , Intravenous, Continuous    piperacillin-tazobactam (Zosyn) 3.375 g in dextrose 5% 100 mL IVPB-ADDV, 3.375 g, Intravenous, Q8H    HYDROmorphone in sodium chloride 0.9% (Dilaudid) 20 mg/100mL PCA premix, , Intravenous, Continuous    QUEtiapine (SEROquel) tab 25 mg, 25 mg, Oral, Nightly    enoxaparin (Lovenox) 40 MG/0.4ML SUBQ injection 40 mg, 40 mg, Subcutaneous, Q24H    ketorolac (Toradol) 15 MG/ML injection 15 mg, 15 mg, Intravenous, Q6H PRN    tamsulosin (Flomax) cap 0.4 mg, 0.4 mg, Oral, Nightly    busPIRone (Buspar) tab 5 mg, 5 mg, Oral, BID    sennosides (Senokot) tab 8.6 mg, 8.6 mg, Oral, BID    benzonatate (Tessalon) cap 100 mg, 100 mg, Oral, Q8H    sodium chloride 0.9% infusion, , Intravenous, Continuous    HYDROmorphone 0.4 mg BOLUS FROM PCA, 0.4 mg, Intravenous, Q30 Min PRN    naloxone (Narcan) 0.4 MG/ML  injection 0.08 mg, 0.08 mg, Intravenous, Q5 Min PRN    acetaminophen (Tylenol) tab 650 mg, 650 mg, Oral, Q8H    hydrOXYzine (Atarax) tab 25 mg, 25 mg, Oral, TID PRN    guaiFENesin ER (Mucinex) 12 hr tab 600 mg, 600 mg, Oral, BID    multivitamin (Centrum) chewable tab (Adult) 1 tablet, 1 tablet, Oral, Daily    ipratropium-albuterol (Duoneb) 0.5-2.5 (3) MG/3ML inhalation solution 3 mL, 3 mL, Nebulization, Q6H PRN    dextromethorphan-guaiFENesin (Robitussin-DM)  mg/5mL oral solution 5 mL, 5 mL, Oral, Q6H PRN    ondansetron (Zofran) 4 MG/2ML injection 4 mg, 4 mg, Intravenous, Q6H PRN    prochlorperazine (Compazine) 10 MG/2ML injection 5 mg, 5 mg, Intravenous, Q8H PRN    Review of Systems:  A 10-point review of systems was done with pertinent positives and negatives per the HPI.    Physical Exam:   Blood pressure 93/52, pulse 99, temperature 98.1 °F (36.7 °C), temperature source Temporal, resp. rate 14, height 5' 2\" (1.575 m), weight 184 lb 4.9 oz (83.6 kg), SpO2 94%.   General: Patient is alert; appeared uncomfortable, but improved after she pressed for her pain med   Chest: Clear to auscultation. + cough    Heart: Regular rate     Abdomen: distended.   Extremities: + b/l LE edema    Neurological: Grossly intact.          Laboratory Data:    Lab Results   Component Value Date    WBC 32.2 01/16/2024    HGB 7.7 01/16/2024    HCT 25.4 01/16/2024    .0 01/16/2024    CREATSERUM 0.71 01/16/2024    BUN 26 01/16/2024     01/16/2024    K 3.8 01/16/2024     01/16/2024    CO2 25.0 01/16/2024    GLU 96 01/16/2024    CA 9.9 01/16/2024    ALB 3.0 01/15/2024    ALB 3.0 01/15/2024    ALKPHO 211 01/15/2024    ALKPHO 211 01/15/2024    BILT 1.0 01/15/2024    BILT 1.0 01/15/2024    TP 6.9 01/15/2024    TP 6.9 01/15/2024     01/15/2024     01/15/2024    ALT 48 01/15/2024    ALT 48 01/15/2024    MG 2.7 01/15/2024    PHOS 3.2 01/16/2024       Imaging:  XR CHEST AP PORTABLE  (CPT=71045)    Result Date:  1/15/2024  CONCLUSION:  1. When compared to most recent chest radiograph dated 1/8/2024, increasing bilateral interstitial and nodular opacities consistent with metastases.  Superimpose infectious/edematous process cannot be excluded.   LOCATION:  [facility]      Dictated by (CST): Deyanira Whipple MD on 1/15/2024 at 6:56 PM     Finalized by (CST): Deyanira Whipple MD on 1/15/2024 at 7:06 PM       MRI BRAIN (W+WO) (CPT=70553)    Result Date: 1/15/2024  CONCLUSION:  1. No abnormal enhancement to suggest metastases.   LOCATION:  [facility]    Dictated by (CST): Deyanira Whipple MD on 1/15/2024 at 5:40 PM     Finalized by (CST): Deyanira Whipple MD on 1/15/2024 at 5:44 PM       US ABDOMEN LIMITED (CPT=76705)    Result Date: 1/14/2024  CONCLUSION:  Limited ultrasound of the abdomen was performed evaluating all 4 quadrants of the abdomen and pelvis for ascites.   There is trace ascites identified adjacent to the liver.  No large pocket of ascites demonstrated.   LOCATION:  [id number]   Dictated by (CST): Sherita Whitman MD on 1/14/2024 at 1:31 PM     Finalized by (CST): Sherita Whitman MD on 1/14/2024 at 1:32 PM       XR ABDOMEN (1 VIEW) (CPT=74018)    Result Date: 1/13/2024  CONCLUSION:  No acute disease.    LOCATION:  [facility]   Dictated by (CST): Nomi Dawson MD on 1/13/2024 at 6:58 PM     Finalized by (CST): Nomi Dawson MD on 1/13/2024 at 6:59 PM       MRI STROKE BRAIN DWI ONLY(NO IV)(CPT=70551)    Result Date: 1/12/2024  CONCLUSION:  Severely limited study with motion artifact on a DWI only study demonstrating no definite evidence of infarct.   LOCATION:  [facility]   Dictated by (CST): Nomi Dawson MD on 1/12/2024 at 4:56 PM     Finalized by (CST): Nomi Dawson MD on 1/12/2024 at 4:59 PM       CT CHEST PE AORTA (IV ONLY) (CPT=71260)    Result Date: 1/10/2024  CONCLUSION:   1. Negative for pulmonary embolism.  2. 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  Larger areas of consolidation involving the lower lobes and  lingula may reflect additional sites of metastatic disease versus superimposed pneumonia.  The right lower lobe consolidation is increased in size compared to 01/08/2024.  3. Findings of extensive malignant lymphadenopathy of the thorax.  4. Widespread hepatic metastatic disease.    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   LOCATION:  [facility]    Dictated by (CST): Bre Silva DO on 1/09/2024 at 10:16 PM     Finalized by (CST): Bre Silva DO on 1/09/2024 at 10:18 PM       US BIOPSY LIVER (CPT=76942/44810)    Result Date: 1/9/2024  CONCLUSION:  Technically successful ultrasound-guided core biopsy of a representative right hepatic lobe mass.   LOCATION:  [facility]     Dictated by (CST): Ranulfo Doshi MD on 1/09/2024 at 1:03 PM     Finalized by (CST): Ranulfo Doshi MD on 1/09/2024 at 1:04 PM       CT CHEST (NSA=16330)    Result Date: 1/8/2024  CONCLUSION:  Innumerable pulmonary masses are noted with large confluent masses at lung bases bilaterally.  There is mediastinal and hilar lymphadenopathy.  Findings are consistent with a diffusely metastatic disease.  LOCATION:     Dictated by (CST): Melvin Adan MD on 1/08/2024 at 7:53 PM     Finalized by (CST): Melvin Adan MD on 1/08/2024 at 7:58 PM       CT ABDOMEN PELVIS IV CONTRAST, NO ORAL (ER)    Result Date: 1/8/2024  CONCLUSION:  1. Findings concerning for metastatic disease.  There are innumerable nodules within the visualized lungs as well as innumerable metastatic lesions throughout the liver.  Additionally, there is an omental base mass within the right lower quadrant and a large mass which appears to be emanating from the left ovary  measuring 7.8 cm. 2. There is no discrete evidence of an acute intra-abdominal or pelvic process.   LOCATION:  [id number]   Dictated by (CST): Bre Silva DO on 1/08/2024 at 12:17 PM     Finalized by (CST): Bre Silva DO on 1/08/2024 at 12:25 PM       XR CHEST PA + LAT CHEST (CPT=71046)    Result Date: 1/8/2024  CONCLUSION:  1. Suggestion of multiple bilateral pulmonary nodules.  Recommend CT of the chest for further evaluation.   LOCATION:  [facility]   Dictated by (CST): Deyanira Whipple MD on 1/08/2024 at 10:27 AM     Finalized by (CST): Deyanira Whipple MD on 1/08/2024 at 10:29 AM         Impression and Plan:    Patient Active Problem List   Diagnosis    Hypokalemia    Metastatic malignant neoplasm, unspecified site (HCC)    Hyponatremia    Leukocytosis, unspecified type    Anemia, unspecified type    Cancer related pain    Palliative care by specialist    Goals of care, counseling/discussion       Impression  Metastatic malignancy based on imaging, unclear primary lesion.   Widely metastatic with lymphangitic spread in lungs, liver metastases, pelvic metastases, ovarian mass.  Liver biopsy was done 1/9 per IR.  Prelim pathology is malignant, but not much further clarification is yet available.   Probably carcinoma based on IHC.  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   She needed one unit PRBCs. Venofer x 3 has completed earlier this week.  Tachycardia, SOB.   CTA negative for PE  Pain control on PCA.  Palliative care following     Plan:  I discussed with the patient and family that currently we are awaiting pathology results to find out etiology and I think this is a GYN primary.   I will call pathology to see when results would be updated  If pathology is consistent with GYN primary or is taking too long given that she is declining, we may opt towards 1 cycle of carboplatin + paclitaxel as inpatient  MRI Brain was negative   Palliative Care following    Kendell Mcarthur  MD

## 2024-01-16 NOTE — CONSULTS
ICU  Critical Care APRN Consult Note    NAME: Alysia Campos - ROOM: 467/467-A - MRN: [id number] - Age: 53 year old - :7/15/1970    History Of Present Illness:  Alysia Campos is a 53 year old female with PMHx significant for newly found ovarian mass concerning for metastatic disease who underwent a MRI under general anesthesia tonight and required oxygen along with being short of breath in PACU.  A CXR showed increase in bilateral interstitial and nodular opacities consistent with metastases, but could not exclude infectious/ edematous process.  She was transferred to ICU for closer monitoring.  She is alert and appropriate with 10/10 abdominal pain on dilaudid pca.  Noted anxiety and increase work of breathing.  She was pan cultured and started on BSAB, along with receiving sublingual lorazepam and 40mg of IV lasix.  Noted improvement in breathing and now on nasal canula.  Denies chest pain. CTA on 1/10 was negative for PE, but showed widespread metastatic disease.  On Lovenox 40mg Q night.     Past Medical History:  Past Medical History:   Diagnosis Date    Anemia     Migraines      Past Surgical History:   Past Surgical History:   Procedure Laterality Date    COLONOSCOPY N/A 2024    Procedure: .;  Surgeon: Ubaldo Morton MD;  Location:  ENDOSCOPY      Family History:   Family History   Problem Relation Age of Onset    Breast Cancer Mother 60 - 69     Social History:    reports that she has never smoked. She has never used smokeless tobacco. She reports that she does not currently use alcohol. She reports that she does not use drugs.     Review of Systems:   A comprehensive 10 point review of systems was completed.  Pertinent positives and negatives noted in the HPI.    Current Facility-Administered Medications   Medication Dose Route Frequency    LORazepam Intensol 2 mg/mL oral concentrated solution 1 mg  1 mg Sublingual Q4H PRN    HYDROmorphone in sodium chloride 0.9% (Dilaudid) 20 mg/100mL PCA  premix   Intravenous Continuous    QUEtiapine (SEROquel) tab 25 mg  25 mg Oral Nightly    enoxaparin (Lovenox) 40 MG/0.4ML SUBQ injection 40 mg  40 mg Subcutaneous Q24H    ketorolac (Toradol) 15 MG/ML injection 15 mg  15 mg Intravenous Q6H PRN    tamsulosin (Flomax) cap 0.4 mg  0.4 mg Oral Nightly    busPIRone (Buspar) tab 5 mg  5 mg Oral BID    sennosides (Senokot) tab 8.6 mg  8.6 mg Oral BID    benzonatate (Tessalon) cap 100 mg  100 mg Oral Q8H    sodium chloride 0.9% infusion   Intravenous Continuous    HYDROmorphone 0.4 mg BOLUS FROM PCA  0.4 mg Intravenous Q30 Min PRN    naloxone (Narcan) 0.4 MG/ML injection 0.08 mg  0.08 mg Intravenous Q5 Min PRN    acetaminophen (Tylenol) tab 650 mg  650 mg Oral Q8H    hydrOXYzine (Atarax) tab 25 mg  25 mg Oral TID PRN    guaiFENesin ER (Mucinex) 12 hr tab 600 mg  600 mg Oral BID    multivitamin (Centrum) chewable tab (Adult) 1 tablet  1 tablet Oral Daily    ipratropium-albuterol (Duoneb) 0.5-2.5 (3) MG/3ML inhalation solution 3 mL  3 mL Nebulization Q6H PRN    dextromethorphan-guaiFENesin (Robitussin-DM)  mg/5mL oral solution 5 mL  5 mL Oral Q6H PRN    ondansetron (Zofran) 4 MG/2ML injection 4 mg  4 mg Intravenous Q6H PRN    prochlorperazine (Compazine) 10 MG/2ML injection 5 mg  5 mg Intravenous Q8H PRN     OBJECTIVE  Vitals:  BP (!) 179/81   Pulse (!) 135   Temp 98.4 °F (36.9 °C) (Temporal)   Resp (!) 41   Ht 157.5 cm (5' 2\")   Wt 184 lb 4.9 oz (83.6 kg)   LMP  (LMP Unknown)   SpO2 94%   BMI 33.71 kg/m²             Physical Exam:    General Appearance: Alert, cooperative, no acute distress, appears stated age  Neck: No JVD, neck supple, no adenopathy, trachea midline, no carotid bruits  Lungs: diminished to auscultation bilaterally, respirations unlabored  Heart: tachycardic and rhythm, S1 and S2 normal, no murmur, rub or gallop  Abdomen: Soft, non-tender, bowel sounds active all four quadrants, no masses, no organomegaly  Extremities: Atraumatic, no cyanosis  or edema, capillary refill <3 sec.    Pulses: 2+ and symmetric all extremities  Skin: Skin color, texture, turgor normal for ethnicity, no rashes or lesions, warm and dry  Neurologic: CNII-XII intact, normal strength    Data this admission:  XR CHEST AP PORTABLE  (CPT=71045)    Result Date: 1/15/2024  CONCLUSION:  1. When compared to most recent chest radiograph dated 1/8/2024, increasing bilateral interstitial and nodular opacities consistent with metastases.  Superimpose infectious/edematous process cannot be excluded.   LOCATION:  [facility]      Dictated by (CST): Deyanira Whipple MD on 1/15/2024 at 6:56 PM     Finalized by (CST): Deyanira Whipple MD on 1/15/2024 at 7:06 PM       MRI BRAIN (W+WO) (CPT=70553)    Result Date: 1/15/2024  CONCLUSION:  1. No abnormal enhancement to suggest metastases.   LOCATION:  [facility]    Dictated by (CST): Deyanira Whipple MD on 1/15/2024 at 5:40 PM     Finalized by (CST): Deyanira Whipple MD on 1/15/2024 at 5:44 PM       US ABDOMEN LIMITED (CPT=76705)    Result Date: 1/14/2024  CONCLUSION:  Limited ultrasound of the abdomen was performed evaluating all 4 quadrants of the abdomen and pelvis for ascites.   There is trace ascites identified adjacent to the liver.  No large pocket of ascites demonstrated.   LOCATION:  [id number]   Dictated by (CST): Sherita Whitman MD on 1/14/2024 at 1:31 PM     Finalized by (CST): Sherita Whitman MD on 1/14/2024 at 1:32 PM       XR ABDOMEN (1 VIEW) (CPT=74018)    Result Date: 1/13/2024  CONCLUSION:  No acute disease.    LOCATION:  [facility]   Dictated by (CST): Nomi Dawson MD on 1/13/2024 at 6:58 PM     Finalized by (CST): Nomi Dawson MD on 1/13/2024 at 6:59 PM       MRI STROKE BRAIN DWI ONLY(NO IV)(CPT=70551)    Result Date: 1/12/2024  CONCLUSION:  Severely limited study with motion artifact on a DWI only study demonstrating no definite evidence of infarct.   LOCATION:  [facility]   Dictated by (CST): Nomi Dawson MD on 1/12/2024 at 4:56 PM     Finalized by (CST): Nomi Dawson MD on  1/12/2024 at 4:59 PM       CT CHEST PE AORTA (IV ONLY) (CPT=71260)    Result Date: 1/10/2024  CONCLUSION:   1. Negative for pulmonary embolism.  2. 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  Larger areas of consolidation involving the lower lobes and lingula may reflect additional sites of metastatic disease versus superimposed pneumonia.  The right lower lobe consolidation is increased in size compared to 01/08/2024.  3. Findings of extensive malignant lymphadenopathy of the thorax.  4. Widespread hepatic metastatic disease.    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   LOCATION:  [facility]    Dictated by (CST): Bre Silva DO on 1/09/2024 at 10:16 PM     Finalized by (CST): Bre Silva DO on 1/09/2024 at 10:18 PM       US BIOPSY LIVER (CPT=76942/29914)    Result Date: 1/9/2024  CONCLUSION:  Technically successful ultrasound-guided core biopsy of a representative right hepatic lobe mass.   LOCATION:  [facility]     Dictated by (CST): Ranulfo Doshi MD on 1/09/2024 at 1:03 PM     Finalized by (CST): Ranulfo Doshi MD on 1/09/2024 at 1:04 PM       CT CHEST (UWV=79578)    Result Date: 1/8/2024  CONCLUSION:  Innumerable pulmonary masses are noted with large confluent masses at lung bases bilaterally.  There is mediastinal and hilar lymphadenopathy.  Findings are consistent with a diffusely metastatic disease.  LOCATION:     Dictated by (CST): Melvin Adan MD on 1/08/2024 at 7:53 PM     Finalized by (CST): Melvin Adan MD on 1/08/2024 at 7:58 PM       CT ABDOMEN PELVIS IV CONTRAST, NO ORAL (ER)    Result Date: 1/8/2024  CONCLUSION:  1. Findings  concerning for metastatic disease.  There are innumerable nodules within the visualized lungs as well as innumerable metastatic lesions throughout the liver.  Additionally, there is an omental base mass within the right lower quadrant and a large mass which appears to be emanating from the left ovary measuring 7.8 cm. 2. There is no discrete evidence of an acute intra-abdominal or pelvic process.   LOCATION:  [id number]   Dictated by (CST): Bre Silva DO on 1/08/2024 at 12:17 PM     Finalized by (CST): Bre Silva DO on 1/08/2024 at 12:25 PM       XR CHEST PA + LAT CHEST (CPT=71046)    Result Date: 1/8/2024  CONCLUSION:  1. Suggestion of multiple bilateral pulmonary nodules.  Recommend CT of the chest for further evaluation.   LOCATION:  [facility]   Dictated by (CST): Deyanira Whipple MD on 1/08/2024 at 10:27 AM     Finalized by (CST): Deyanira Whipple MD on 1/08/2024 at 10:29 AM       Labs:  Lab Results   Component Value Date    WBC 29.6 01/15/2024    HGB 7.6 01/15/2024    HCT 25.8 01/15/2024    .0 01/15/2024    CREATSERUM 0.80 01/15/2024    BUN 23 01/15/2024     01/15/2024    K 3.7 01/15/2024     01/15/2024    CO2 22.0 01/15/2024    GLU 79 01/15/2024    CA 10.1 01/15/2024     Assessment/Plan:    Hypoxia, shortness of breath 2/2 fluid overload vs developing pneumonia  Lactic, procal, sputum, urine, rvp ordered  Recheck CXR in AM  S/p 40mg lasix- re-eval as needed  Low rate IVF  Strict intake and output  BSAB  Trend lactic to clear  Wean oxygen as tolerated  Metastatic CA  Per hem/onc  MRI brain completed  Pain 2/2 #2  Palliative on consult  On dilaudid pca  Anxiety  Prn sublingual lorazepam ordered  Continue buspar   Anemia  Resolved- on lovenox     F/E/N:    -IV fluids  -Follow lytes and replete prn  -diet as tolerated    Proph:  -SQ Lovenox  -SCD    Dispo:     Code Status: Full Code  -ICU for close monitoring    Plan of care discussed with intensivist on-call., Dr. Spaulding.          DILLAN Muller  Critical  Care Nurse Practitioner  Spectra # [phone]            A total of 35 minutes of critical care time (exclusive of billable procedures) was administered. This involved direct patient intervention, complex decision making, and/or extensive discussions (>50% face to face time) with the patient, family, and clinical staff.    The 21st Century Cures Act makes medical notes like these available to patients in the interest of transparency. Please be advised this is a medical document. Medical documents are intended to carry relevant information, facts as evident, and the clinical opinion of the practitioner. The medical note is intended as peer to peer communication and may appear blunt or direct. It is written in medical language and may contain abbreviations or verbiage that are unfamiliar.

## 2024-01-16 NOTE — OCCUPATIONAL THERAPY NOTE
Attempted to see pt for skilled OT services this date. Pt is currently not appropriate per RN. Pt required assist x4 for commode T/F back to bed and has AMS. Will re-attempt tomorrow as schedule allows. Courtney Khan, 01/16/24, 8:20 AM

## 2024-01-16 NOTE — PHYSICAL THERAPY NOTE
Attempted to see pt for skilled PT services this date. Pt is currently not appropriate per RN. Pt required assist x4 for commode T/F back to bed and has AMS. Will re-attempt tomorrow as schedule allows.

## 2024-01-16 NOTE — PROGRESS NOTES
01/16/24 0905   BiPAP   $ RT Standby Charge (per 15 min) 1   $ BiPAP in use daily Yes   Device V60   BiPAP / CPAP CE# 05   BiPAP bacteria filter Yes   BiPAP Pre-use check ok? Yes   BIPAP plugged into main power? Yes   Mode Spontaneous/Timed   Interface Full face mask   Mask Size Medium   Control Settings   Set Rate 16 breaths/min   Set IPAP 12   Set EPAP 5   Oxygen Percent 40 %   Inspiratory time 0.9   Insp rise time 3   SIPAP   FiO2 (%) 40 %   Resp (!) 33   BiPAP/CPAP Alarm Settings   Hi Rate 50   Low Rate 10   Hi VT 1200   Low    Hi Pressure 25   Low Pressure 7   Low MV 2   Apnea 20   BiPAP/CPAP Monitored Parameters   Pulse 120   SpO2 96 %   PIP 13   Total Rate 36 breaths/min   Minute Volume 21   Tidal Volume 596   Total Leak 16   Trigger % 93   Ti/Ttot % 33   JOSIANE Protocol No   Toleration Well     RT called for ABG, results in chart elevated CO2 placed on Bipap on above settings. Pt anxious daughter at bedside RN to start Precedex. Monitoring on going.    1309 Per APN change from Bipap to AVAPS 16/500/40%+5 Dobhoff placed pt was off for hour.    Abg drawn results given to Pulm. Keep pt on avaps at this time.

## 2024-01-16 NOTE — PROGRESS NOTES
Alysia HAWK Beth Patient Status:  Inpatient    7/15/1970 MRN [id number]   [facility] 4SW-A Attending Maikel Valera DO   Hosp Day # 8 PCP Renetta Santos DO     Critical Care Progress Note          Subjective:  Lethargic this AM, only able to nod and shake head to questions. Requiring 6L/NC.    Objective:    Medications:   piperacillin-tazobactam  3.375 g Intravenous Q8H    QUEtiapine  25 mg Oral Nightly    enoxaparin  40 mg Subcutaneous Q24H    tamsulosin  0.4 mg Oral Nightly    busPIRone  5 mg Oral BID    sennosides  8.6 mg Oral BID    benzonatate  100 mg Oral Q8H    acetaminophen  650 mg Oral Q8H    guaiFENesin ER  600 mg Oral BID    multivitamin  1 tablet Oral Daily              Intake/Output Summary (Last 24 hours) at 2024 0813  Last data filed at 2024 0510  Gross per 24 hour   Intake 826.7 ml   Output 1350 ml   Net -523.3 ml       BP 95/52   Pulse 103   Temp 98.1 °F (36.7 °C) (Temporal)   Resp 13   Ht 157.5 cm (5' 2\")   Wt 184 lb 4.9 oz (83.6 kg)   LMP  (LMP Unknown)   SpO2 92%   BMI 33.71 kg/m²     Physical Exam:   General Appearance: Lethargic, nodding and shaking head to questions.  Neck: No JVD, neck supple, no adenopathy, trachea midline, no carotid bruits  Lungs: Clear to auscultation bilaterally, respirations unlabored  Heart: Regular rate and rhythm, S1 and S2 normal, no murmur, rub or gallop  Abdomen: Distending, tender to palpation. Hypoactive bowel sounds.  Extremities: Extremities  significantly edematous.   Pulses: 2+ and symmetric all extremities  Skin: Skin color, texture, turgor normal for ethnicity, no rashes or lesions, warm and dry    Recent Labs   Lab 24  0503   RBC 3.40*   HGB 7.7*   HCT 25.4*   MCV 74.7*   MCH 22.6*   MCHC 30.3*   RDW 24.1   NEPRELIM 24.12*   WBC 32.2*   .0     Recent Labs   Lab 24  0704 24  1622 01/15/24  0704 01/15/24  2027 01/16/24  0503   *   < > 79 96 96   BUN 14   < > 23 25* 26*   CREATSERUM 0.57    < > 0.80 0.65 0.71   CA 9.3   < > 10.1 10.5* 9.9   ALB 3.1*  --   --  3.0*  3.0*  --    *   < > 140 141 140   K 3.6   < > 3.7 4.1 3.8   CL 99   < > 110 111 109   CO2 21.0   < > 22.0 19.0* 25.0   ALKPHO 211*  --   --  211*  211*  --    AST 96*  --   --  120*  120*  --    ALT 39  --   --  48  48  --    BILT 0.9  --   --  1.0  1.0  --    TP 7.0  --   --  6.9  6.9  --     < > = values in this interval not displayed.     Recent Labs   Lab 01/15/24  1906   ABGPHT 7.35   NGTUNC7H 41   QRHPC4L 111*   ABGHCO3 22.7   ABGBE -2.8*   TEMP 98.6   MOE Positive   SITE Right Radial   DEV Other-See Comment   THGB 8.6*     No results for input(s): \"BNP\" in the last 168 hours.  No results for input(s): \"TROP\", \"CK\" in the last 168 hours.    XR CHEST AP PORTABLE  (CPT=71045)    Result Date: 1/16/2024  CONCLUSION:  No significant interval change.   LOCATION:  [id number]      Dictated by (CST): Silviano Motta MD on 1/16/2024 at 6:47 AM     Finalized by (CST): Silviano Motta MD on 1/16/2024 at 6:48 AM       XR CHEST AP PORTABLE  (CPT=71045)    Result Date: 1/15/2024  CONCLUSION:  1. When compared to most recent chest radiograph dated 1/8/2024, increasing bilateral interstitial and nodular opacities consistent with metastases.  Superimpose infectious/edematous process cannot be excluded.   LOCATION:  [facility]      Dictated by (CST): Deyanira Whipple MD on 1/15/2024 at 6:56 PM     Finalized by (CST): Deyanira Whipple MD on 1/15/2024 at 7:06 PM       MRI BRAIN (W+WO) (CPT=70553)    Result Date: 1/15/2024  CONCLUSION:  1. No abnormal enhancement to suggest metastases.   LOCATION:  [facility]    Dictated by (CST): Deyanira Whipple MD on 1/15/2024 at 5:40 PM     Finalized by (CST): Deyanira Whipple MD on 1/15/2024 at 5:44 PM           Assessment/Plan:  Acute hypoxic respiratory failure  - Currently requiring 6L/NC, wean as tolerated. Increased o2 needs post anesthesia for MRI.  - Chest xray with bilateral pulmonary masses with small right and trace left pleural  effusions.  - CTA chest negative for PE  - RVP negative  - S/p lasix x1 1/15  - Wean o2 as able    Acute encephalopathy  - Likely 2/2 to anesthesia required for MRI brain  - Continuous dilaudid PCA could also be a contributing factor, consider narcan if no improvement  - MRI brain negative for acute process  - Glucose stable  - Check Ammonia/ hepatic function panel today.  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  - S/p ultrasound guiding biopsy of liver 1/9/23, pathology pending  - MRI brain without metastases   - Gyn/Oncology following    Abdominal pain/distention  - Abd ultrasound 1/14 with trace ascites, KUB without acute process.  - CT abdomen with hepatomegaly  - Dilaudid PCA, norco PRN  - Bowel regime    Significant LE edema  - Pro BNP  - Net +7.8L since admit  - Discontinue IVF  - Will check TTE    Urinary retention  - Flomax  - Urology following    Leukocytosis  - WBC has consistently been elevated >50646 this admission- appears stable  - Afebrile  - Pct 0.65  - Lactic acid 2.2, now normalized  - Blood cultures pending  - MRSA negative  - Check u/a  - Check sputum culture  - Zosyn (1/15- )    Tachycardia  - Likely multifactorial due to ongoing pain, hypoxia, volume overload  - Obtain EKG  - May need low dose BB    Microcytic Anemia 2/2 to iron deficiency  - EGD/colonoscopy 1/11/23 without active bleed- biopsies pending  - No evidence of active bleeding  - Baseline hgb 7.5-8.0.  - S/p 1u prbcs 1/10/23, 1u prbcs 1/12/23  - s/p IV venofer (1/9-1/11)  - Transfuse to maintain HGB >7.  - Daily CBC    F/E/N:    - Discontinue IVF  - Repelte electrolyte per protocl  - ADAT- decreased oral intake. Start calorie count, may need DHT/TF if no improvement.    Proph:  - SQ Lovenox  - SCDs  - PT/OT    Dispo:   - Full code  - Palliative care  following  - ICU monitoring    Plan of care discussed with intensivist, Dr. Uriel Abreu [facility]  Critical Care  [id number]    Addendum    I agree with critical care APN note above. I have independently seen & evaluated the patient.  I agree with the management plan outlined above with the above changes/additions.    Has had to go on AVAPs this afternoon.  Was initially on HFNC then BiPAP now AVAPs.   She remains somnolent.  Has started lactulose for elevated ammonia.  Will cont to work to optimize resp status.  Discussed with family that given her tenuous health, initiating chemo is not without risk.  They are in discussion w/ oncology and palliative care.      Gómez Trevino  Pulmonary And Critical Care  On call Intensivist 01/16/24       Medically necessary and clinically appropriate Critical Care Time: 40 minutes

## 2024-01-16 NOTE — PROGRESS NOTES
1730: patient received from MRI with anesthesia at bedside. Patient on 15L via simple mask.    1750: patient arousable, oral airway removed. Patient denies pain at this time, to wean 02 as tolerated, see charting.    1815: patient with noted increased work of breathing, increasing 02 requirements. Lungs diminished a/p bilaterally. Anesthesia notified of change in condition. No orders received at this time. RN to call hospitalist to update on status.    Hospitalist paged and to bedside. Orders placed for ABG and XRAY. Oxygen titrated per 02 sats. Orders received for ICU transfer. Report given to Mayra PENA for transfer of care to ICU.

## 2024-01-16 NOTE — ANESTHESIA POSTPROCEDURE EVALUATION
[facility]    Alysia Campos Patient Status:  Inpatient   Age/Gender 53 year old female MRN [id number]   Location [facility] 4NW-A Attending Maikel Valera DO   Hosp Day # 7 PCP Renetta Santos DO       Anesthesia Post-op Note        Procedure Summary       Date: 01/15/24 Room / Location: [facility] MRI    Anesthesia Start: 1610 Anesthesia Stop: 1730    Procedure: MRI BRAIN (W+WO) (CPT=70553) Diagnosis: (diplopia with diffusely metastatic disease; evaluate for mets)    Scheduled Providers: Michael Hawthorne MD Anesthesiologist: Michael Hawthorne MD    Anesthesia Type: general ASA Status: 3            Anesthesia Type: general    Vitals Value Taken Time   /94 01/15/24 1830   Temp 98.7 °F (37.1 °C) 01/15/24 1730   Pulse 121 01/15/24 1831   Resp 27 01/15/24 1831   SpO2 97 % 01/15/24 1831   Vitals shown include unfiled device data.    Patient Location: PACU    Anesthesia Type: general    Airway Patency: patent and extubated    Postop Pain Control: adequate    Mental Status: preanesthetic baseline    Nausea/Vomiting: none    Postop cardiopulmonary/hydration: 6 L NC with labored breathing prior to anesthesia. Now requirig 10 L face mask.    Complications: no apparent anesthesia related complications    Postop vital signs: stable    Dental Exam: Unchanged from Preop    Patient to be discharged from PACU when criteria met.

## 2024-01-16 NOTE — PAYOR COMM NOTE
FOR 1/15 & 1/16  ADMIT REVIEW &  1/12-1/14 ALREADY SENT    CONTINUED STAY REVIEW    Payor: [employer] OPEN ACCESS   Subscriber #:  [id number]  Authorization Number: [id number]    Admit date: 1/8/24  Admit time:  2:50 PM    Admitting Physician: Harshal Aponte MD  Attending Physician:  Maikel Valera DO  Primary Care Physician: Renetta Santos DO    MEDICATIONS ADMINISTERED IN LAST 1 DAY:  acetaminophen (Tylenol) tab 650 mg       Date Action Dose Route User    1/16/2024 1313 Given 650 mg Oral Yazmin Gama RN          dexamethasone (Decadron) 4 MG/ML injection       Date Action Dose Route User    1/15/2024 1650 Given 4 mg Intravenous Michael Hawthorne MD          dexmedeTOMIDine (Precedex) 800 mcg in sodium chloride 0.9% 100 mL infusion       Date Action Dose Route User    1/16/2024 1014 New Bag 0.2 mcg/kg/hr × 83.6 kg (Dosing Weight) Intravenous Yazmin Gama RN          enoxaparin (Lovenox) 40 MG/0.4ML SUBQ injection 40 mg       Date Action Dose Route User    1/15/2024 2117 Given 40 mg Subcutaneous (Right Lower Abdomen) Katie Acosta RN          furosemide (Lasix) 10 mg/mL injection 40 mg       Date Action Dose Route User    1/15/2024 2025 Given 40 mg Intravenous Yazmin hCaney RN          furosemide (Lasix) 10 mg/mL injection       Date Action Dose Route User    1/15/2024 2025 Given 40 mg Intravenous Yazmin Chaney RN          furosemide (Lasix) 10 mg/mL injection 20 mg       Date Action Dose Route User    1/16/2024 1314 Given 20 mg Intravenous Yazmin Gama RN          gadoterate meglumine (Dotarem) 10 MMOL/20ML injection 20 mL       Date Action Dose Route User    1/15/2024 1707 Given 20 mL Intravenous Joseph Landa          HYDROmorphone (Dilaudid) 1 MG/ML injection 0.2 mg       Date Action Dose Route User    1/15/2024 1933 Given 0.2 mg Intravenous Mirlande Brantley RN    1/15/2024 1810 Given 0.2 mg Intravenous Mirlande Brantley, RN          HYDROmorphone (Dilaudid) 1  MG/ML injection 0.4 mg       Date Action Dose Route User    1/15/2024 1950 Given 0.4 mg Intravenous Mirlande Brantley RN          HYDROmorphone (Dilaudid) 1 MG/ML injection       Date Action Dose Route User    1/15/2024 1933 Given 0.2 mg Intravenous Mirlande Brantley RN          ipratropium-albuterol (Duoneb) 0.5-2.5 (3) MG/3ML inhalation solution 3 mL       Date Action Dose Route User    1/15/2024 1440 Given 3 mL Nebulization Louis Wilde RCP          ketorolac (Toradol) 15 MG/ML injection 15 mg       Date Action Dose Route User    1/16/2024 0042 Given 15 mg Intravenous Katie Acosta RN          lactated ringers infusion       Date Action Dose Route User    1/15/2024 1730 Rate/Dose Change (none) Intravenous Mirlande Brantley RN          lactulose (CHRONULAC) 10 GM/15ML solution 20 g       Date Action Dose Route User    1/16/2024 1313 Given 20 g Oral Yazmin Gama RN          lidocaine PF (Xylocaine-MPF) 1% injection       Date Action Dose Route User    1/15/2024 1620 Given 5 mL Intravenous Michael Hawthorne MD          LORazepam Intensol 2 mg/mL oral concentrated solution 1 mg       Date Action Dose Route User    1/15/2024 2113 Given 1 mg Sublingual Katie Acosta RN          midazolam (Versed) 5 MG/5ML injection 0.5 mg       Date Action Dose Route User    1/15/2024 1928 Given 0.5 mg Intravenous Mirlande Brantley RN          ondansetron (Zofran) 4 MG/2ML injection 4 mg       Date Action Dose Route User    1/15/2024 2120 Given 4 mg Intravenous Katie Acosta RN          ondansetron (Zofran) 4 MG/2ML injection       Date Action Dose Route User    1/15/2024 1650 Given 4 mg Intravenous Michael Hawthorne MD          phenylephrine (Zurdo-Synephrine) 10 MG/ML injection       Date Action Dose Route User    1/15/2024 1642 Given 100 mcg Intravenous Michael Hawthorne MD          piperacillin-tazobactam (Zosyn) 3.375 g in dextrose 5% 100 mL IVPB-ADDV       Date Action Dose Route User    1/16/2024 0600 New Bag  3.375 g Intravenous Katie Acosta RN    1/15/2024 2252 New Bag 3.375 g Intravenous Katie Acosta RN          propofol (Diprivan) 10 MG/ML injection       Date Action Dose Route User    1/15/2024 1620 Given 50 mg Intravenous Michael Hawthorne MD    1/15/2024 1612 Given 50 mg Intravenous Michael Hawthorne MD          propofol (Diprivan) 10 mg/mL infusion premix       Date Action Dose Route User    1/15/2024 1650 Rate/Dose Change 50 mcg/kg/min × 74.8 kg Intravenous Michael Hawthorne MD    1/15/2024 1612 New Bag 150 mcg/kg/min × 74.8 kg Intravenous Michael Hawthorne MD          QUEtiapine (SEROquel) tab 25 mg       Date Action Dose Route User    1/15/2024 2158 Given 25 mg Oral Katie Acosta RN          sodium chloride 0.9% infusion       Date Action Dose Route User    1/15/2024 1610 Restarted (none) Intravenous Michael Hawthorne MD          sodium chloride 0.9% infusion       Date Action Dose Route User    1/15/2024 2241 New Bag (none) Intravenous Katie Acosta RN          sodium phosphate 15 mmol in 0.9% NaCl 100mL IVPB premix       Date Action Dose Route User    1/15/2024 2248 Given 15 mmol Intravenous Katie Acosta RN              Blood Transfusion Record       Product Unit Status Volume Start End            Transfuse RBC       23  [id number]  [id number] Completed 01/10/24 1317 400 mL 01/10/24 1038 01/10/24 1317       23  [id number]  [id number] Completed 01/12/24 0751 358.33 mL 01/09/24 0830 01/12/24 0745              1/15 HEMA/ONC NOTE    Interval History--  On PCA for pain control, still with more pain. Palliative care following   Due for Brain MRI today with sedation  Pathology results haven't been finalized       Physical Exam:   Blood pressure 125/62, pulse 113, temperature 98.6 °F (37 °C), temperature source Oral, resp. rate 22, height 5' 2\" (1.575 m), weight 165 lb (74.8 kg), SpO2 96%.                General: Patient is alert; appeared uncomfortable, but improved after she pressed for her pain med                 Chest: Clear to auscultation. + cough                 Heart: Regular rate                  Abdomen: distended.                Extremities: + b/l LE edema                 Neurological: Grossly intact.                                    Laboratory Data:          Lab Results   Component Value Date     WBC 29.6 01/15/2024     HGB 7.6 01/15/2024     HCT 25.8 01/15/2024     .0 01/15/2024     CREATSERUM 0.80 01/15/2024     BUN 23 01/15/2024      01/15/2024     K 3.7 01/15/2024      01/15/2024     CO2 22.0 01/15/2024     GLU 79 01/15/2024     CA 10.1 01/15/2024         Imaging:  US ABDOMEN LIMITED (CPT=76705)     Result Date: 1/14/2024  CONCLUSION:  Limited ultrasound of the abdomen was performed evaluating all 4 quadrants of the abdomen and pelvis for ascites.   There is trace ascites identified adjacent to the liver.  No large pocket of ascites demonstrated.   LOCATION:  [id number]   Dictated by (CST): Sherita Whitman MD on 1/14/2024 at 1:31 PM     Finalized by (CST): Sherita Whitman MD on 1/14/2024 at 1:32 PM        Impression and Plan:        Patient Active Problem List   Diagnosis    Hypokalemia    Metastatic malignant neoplasm, unspecified site (HCC)    Hyponatremia    Leukocytosis, unspecified type    Anemia, unspecified type    Cancer related pain    Palliative care by specialist    Goals of care, counseling/discussion         Impression  Metastatic malignancy based on imaging, unclear primary lesion.   Widely metastatic with lymphangitic spread in lungs, liver metastases, pelvic metastases, ovarian mass.  Liver biopsy was done 1/9 per IR.  Prelim pathology is malignant, but not much further clarification is yet available.   Probably carcinoma based on IHC.  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   She needed one unit PRBCs. Venofer x 3 has completed earlier this week.  Tachycardia, SOB.   CTA negative for PE  Pain controll--on PCA.   Palliative care following      Plan:  Await tissue diagnosis.  We noted that we should have a preliminary result from the liver biopsy early this week    2.   Monitor CBC. Received IV iron. Prn transfusion to keep hgb > 7  3.   EGD/Colonoscopy showed no bleeding source and pathology was benign  4.   We discussed that this most likely this represents GYN source but will await confirmation, if needed will proceed with first cycle of chemotherapy as inpatient  5.   Brain MRI to be completed today    1/15 UROLOGY NOTE    Current complaints: bladder pressure. Nurse reports blood with wiping.     Objective:  General appearance: appears uncomfortable  Blood pressure 125/62, pulse 113, temperature 98.6 °F (37 °C), temperature source Oral, resp. rate 22, height 5' 2\" (1.575 m), weight 165 lb (74.8 kg), SpO2 96%.  Abdomen: distended      Assessment:    Sensation of bladder pressure without oliguria/urinary retention      Plan:    Dr. Rosen reviewed cystoscopy previously - suspect bleeding is vaginal  Cont Flomax  Reviewed for relief could also try a IFC trial. Discussed risks of UTI with indwelling. Holding off.     1/15 HOSPITALIST NOTE       Interval History/Subjective:      Pt seen and examined.  She c/o 10/10 pain.  She understands that the MRI brain is schedule for later today, wants to ensure that she is sedated.  No F/C, N/V.       Vital signs:  Temp:  [98.6 °F (37 °C)] 98.6 °F (37 °C)  Pulse:  [113] 113  BP: (125)/(62) 125/62  SpO2:  [96 %] 96 %     Physical Exam:       General: No acute distress. Anxious and uncomfortable, nontoxic Appears pale  HEENT: Normocephalic atraumatic. Moist mucous membranes; Sclera anicteric. EOMI  Neck: Supple, +JVD  Respiratory: Diminished at bilateral bases, otherwise CTA; ;reg resp rate & effort, no wheezes/crackles   Cardiovascular: S1, S2. Tachycardic. No murmurs appreciable   Chest and Back: No tenderness or deformity.  Abdomen: distended with generalized TTP; no guarding/rebound  tenderness; +hepatomegaly  Neurologic: Drowsy but arousable to voice; No focal neurological deficits. CNII-XII grossly intact.  Musculoskeletal: Moves all extremities.  Extremities: No edema or cyanosis.  Integument: No rashes or lesions.   Psychiatric: Appropriate mood and affect.      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       Abdominal Pain / Distention  -- likely secondary to massive hepatomegaly noted on CT   -- KUB normal   -- US Abd with only trace ascites     Urinary Retention  -- BS ~217mL overnight, pt endorses stress incontinence & urinary hesitancy/incomplete voiding  --  consult per Onc -> Flomax trial      Hypoxia, tachycardia -- stable   - BNP elevated may be reflective of volume overload given her persistent IV fluids and recent transfusions  - CT PE negative for PE but with extensive mediastinal and pulmonary tumor burden as the likely cultprit  -- Cont supplemental O2, wean as tolerated      Ovarian Mass  Possible Ovarian Ca with Hepatic and Pulmonary  Metastases   -- CT Abd w/ multiple pulmonary, hepatic metastases, omental mass, 7.8cm left ovarian mass  -- CT Chest performed 1/8 w/ innumerable pulmonary nodules  w/ large confluent masses at bilateral bases  -- Gyn Onc consulted, plan discussed  -Oncology consult, recommendations reviewed, pending results of biopsy  - US pelvis unremarkable   -Status post US guided biopsy of liver 1/9/2024, pathology pending  -Continue Norco, Dilaudid as needed for pain  -1/10: Based on patient's tenuous clinical status, may require first round of chemotherapy as inpatient per oncology, pending initial pathology results  - MRI brain ordered for today, pt requests to be sedated      Hyponatremia-resolved   Possible SIADH   -- likely hypovolemic in the setting of poor PO intake, nausea/vomiting  -- Na 126 on admission, s/p 1L NS bolus  - Sodium correcting appropriately, change BMP to BID  --resolved s/p tolvaptan x1 1/12, change mIVF to TKO      Acute on Chronic Anemia  -- stable  -- recent baseline Hgb 7.7-8.0, 7.0 on admission  -- no e/o active bleeding or hemodynamic instability  -- 1 unit PRBC given 1/9 with minimal response, additional unit ordered today  -- Iron studies consistent with deficiency, Venofer ordered  - CTM, transfuse to maintain goal hemoglobin >7.0  - GI consulted due to persistent anemia, elevated CEA levels  -- EGD/Colonoscopy w/o active bleeding or clear e/o malignancy, biopsies taken, path pending; no further interventions per GI   -- 1/14: Hgb now stable >48 hours, resume lovenox for dvt prophylaxis     DVT Mechanical Prophylaxis:   SCDs,        DVT Pharmacologic Prophylaxis   Medication    enoxaparin (Lovenox) 40 MG/0.4ML SUBQ injection 40 mg               Code Status: FULL      Dispo: inpatient; EVA >2 midnights     1/15 CONSULT NOTE/PULMONARY CONSULT NOTE     History Of Present Illness:  Alysia Campos is a 53 year old female with PMHx significant for newly found ovarian mass concerning for metastatic  disease who underwent a MRI under general anesthesia tonight and required oxygen along with being short of breath in PACU.  A CXR showed increase in bilateral interstitial and nodular opacities consistent with metastases, but could not exclude infectious/ edematous process.  She was transferred to ICU for closer monitoring.  She is alert and appropriate with 10/10 abdominal pain on dilaudid pca.  Noted anxiety and increase work of breathing.  She was pan cultured and started on BSAB, along with receiving sublingual lorazepam and 40mg of IV lasix.  Noted improvement in breathing and now on nasal canula.  Denies chest pain. CTA on 1/10 was negative for PE, but showed widespread metastatic disease.  On Lovenox 40mg Q night.       Assessment/Plan:     Hypoxia, shortness of breath 2/2 fluid overload vs developing pneumonia  Lactic, procal, sputum, urine, rvp ordered  Recheck CXR in AM  S/p 40mg lasix- re-eval as needed  Low rate IVF  Strict intake and output  BSAB  Trend lactic to clear  Wean oxygen as tolerated  Metastatic CA  Per hem/onc  MRI brain completed  Pain 2/2 #2  Palliative on consult  On dilaudid pca  Anxiety  Prn sublingual lorazepam ordered  Continue buspar   Anemia  Resolved- on lovenox      F/E/N:    -IV fluids  -Follow lytes and replete prn  -diet as tolerated     Proph:  -SQ Lovenox  -SCD     Dispo:     Code Status: Full Code  -ICU for close monitoring    01/15/24 2300 -- 114 20 108/52 92 % -- -- 5 L/min TE   01/15/24 2200 -- 129 Abnormal  35 Abnormal  131/84 93 % -- -- 6 L/min TE   01/15/24 2125 -- -- -- -- 95 % -- -- 8 L/min TE   01/15/24 2100 -- 134 Abnormal  29 Abnormal  131/94 Abnormal  94 % -- -- 10 L/min TE   01/15/24 2020 -- 135 Abnormal  41 Abnormal  179/81 Abnormal  94 % -- -- -- LISA   01/15/24 2010 98.4 °F (36.9 °C) 135 Abnormal  28 Abnormal  -- 100 % 184 lb 4.9 oz Simple mask 10 L/min LISA   01/15/24 2000 -- 132 Abnormal  29 Abnormal  -- 100 % -- -- -- RM   01/15/24 1950 -- -- -- -- 95 % --  Simple mask 10 L/min RM   01/15/24 1945 -- 135 Abnormal  26 147/90 97 % -- Simple mask 10 L/min RM   01/15/24 1930 -- 139 Abnormal  33 Abnormal  174/98 Abnormal  96 % -- -- -- RM   01/15/24 1915 -- 130 Abnormal  17 -- 98 % -- -- -- RM   01/15/24 1900 -- 124 Abnormal  38 Abnormal  165/91 Abnormal  100 % -- -- -- RM   01/15/24 1850 -- 122 Abnormal  24 -- 100 % -- Simple mask -- RM   01/15/24 1845 -- 121 Abnormal  23 142/103 Abnormal  100 % -- Simple mask 10 L/min RM   01/15/24 1830 -- 120 38 Abnormal  156/94 Abnormal  97 % -- Simple mask 10 L/min RM   01/15/24 1815 -- 117 32 Abnormal  154/84 98 % -- Simple mask 10 L/min RM   01/15/24 1810 -- 118 25 -- 91 % -- Simple mask 12 L/min RM   01/15/24 1805 -- -- -- -- 90 % -- Non-rebreather mask 15 L/min RM   01/15/24 1800 -- 119 28 Abnormal  154/82 90 % -- Simple mask 8 L/min RM     1/16 HEMA/ONC NOTE    Interval History--  Patient completed MRI Brain with no malignancy noted. She did have increased work of breathing and was moved to ICU currently weaned back down to 3L       Physical Exam:   Blood pressure 93/52, pulse 99, temperature 98.1 °F (36.7 °C), temperature source Temporal, resp. rate 14, height 5' 2\" (1.575 m), weight 184 lb 4.9 oz (83.6 kg), SpO2 94%.                General: Patient is alert; appeared uncomfortable, but improved after she pressed for her pain med                Chest: Clear to auscultation. + cough                 Heart: Regular rate                  Abdomen: distended.                Extremities: + b/l LE edema                 Neurological: Grossly intact.                                    Laboratory Data:          Lab Results   Component Value Date     WBC 32.2 01/16/2024     HGB 7.7 01/16/2024     HCT 25.4 01/16/2024     .0 01/16/2024     CREATSERUM 0.71 01/16/2024     BUN 26 01/16/2024      01/16/2024     K 3.8 01/16/2024      01/16/2024     CO2 25.0 01/16/2024     GLU 96 01/16/2024     CA 9.9 01/16/2024       Imaging:  XR  CHEST AP PORTABLE  (CPT=71045)     Result Date: 1/15/2024  CONCLUSION:  1. When compared to most recent chest radiograph dated 1/8/2024, increasing bilateral interstitial and nodular opacities consistent with metastases.  Superimpose infectious/edematous process cannot be excluded.   LOCATION:  [facility]      Dictated by (CST): Deyanira Whipple MD on 1/15/2024 at 6:56 PM     Finalized by (CST): Deyanira Whipple MD on 1/15/2024 at 7:06 PM        MRI BRAIN (W+WO) (CPT=70553)     Result Date: 1/15/2024  CONCLUSION:  1. No abnormal enhancement to suggest metastases.   LOCATION:  [facility]    Dictated by (CST): Deyanira Whipple MD on 1/15/2024 at 5:40 PM     Finalized by (CST): Deyanira Whipple MD on 1/15/2024 at 5:44 PM          Impression and Plan:        Patient Active Problem List   Diagnosis    Hypokalemia    Metastatic malignant neoplasm, unspecified site (HCC)    Hyponatremia    Leukocytosis, unspecified type    Anemia, unspecified type    Cancer related pain    Palliative care by specialist    Goals of care, counseling/discussion         Impression  Metastatic malignancy based on imaging, unclear primary lesion.   Widely metastatic with lymphangitic spread in lungs, liver metastases, pelvic metastases, ovarian mass.  Liver biopsy was done 1/9 per IR.  Prelim pathology is malignant, but not much further clarification is yet available.   Probably carcinoma based on IHC.  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   She needed one unit PRBCs. Venofer x 3 has completed earlier this week.  Tachycardia, SOB.   CTA negative for PE  Pain control on PCA.  Palliative care following      Plan:  I discussed with the patient and family that currently we are awaiting pathology results to find out etiology and I think this is a GYN primary.   I will call pathology to see when results would be updated  If pathology is consistent with GYN primary or is taking too long given that she is declining, we may opt  towards 1 cycle of carboplatin + paclitaxel as inpatient  MRI Brain was negative   Palliative Care following    1/16 HOSPITALIST NOTE       Interval History/Subjective:      Pt seen and examined.  She was transferred to ICU due to respiratory distress and hypoxia after undergoing MRI brain under anesthesia yesterday.  Now down to 6 L/min O2.  Appears more comfortable and less SOB.  Daughter in law at bedside.      Vital signs:  Temp:  [98 °F (36.7 °C)-98.7 °F (37.1 °C)] 98 °F (36.7 °C)  Pulse:  [] 105  Resp:  [13-41] 19  BP: ()/() 117/51  SpO2:  [88 %-100 %] 96 %  FiO2 (%):  [40 %] 40 %     Physical Exam:       General: No acute distress. Anxious but more comfortable, nontoxic Appears pale  HEENT: Normocephalic atraumatic. Moist mucous membranes; Sclera anicteric. EOMI. NGT in place.  Neck: Supple, +JVD  Respiratory: Diminished at bilateral bases, otherwise CTA; ;reg resp rate & effort, no wheezes/crackles   Cardiovascular: S1, S2. Tachycardic. No murmurs appreciable   Chest and Back: No tenderness or deformity.  Abdomen: distended with generalized TTP; no guarding/rebound tenderness; +hepatomegaly  Neurologic: Drowsy but arousable to voice; No focal neurological deficits. CNII-XII grossly intact.  Musculoskeletal: Moves all extremities.  Extremities: +LE edema   Integument: No rashes or lesions.   Psychiatric: Appropriate mood and affect.         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   Hyponatremia  Acute on Chronic Anemia      Plan     Acute hypoxic respiratory failure   - suspect multifactorial (anesthesia, pulm masses, fluid overload, narcotics)   - CTA chest neg for PE  - s/p IV lasix x 1 on 1/15  - RVP neg  - wean O2 as  tolerated      Acute encephalopathy  - suspect due to anesthesia and narcotics  - ABG with pH 7.30, PCO2 49, PO2  102  - ammonia 55  -  alk phose 193  - MRI brain neg for acute pathology   - consider narcan given high dose opiates on PCA  - consider holding nightly seroquel   - monitor for improvement      Diplopia  -Unclear if secondary to sedation given increased pain requirements  -Given evidence of widely metastatic disease will need to check MRI brain to rule out metastases, pending  -- MRI brain neg for mets.      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   -- CT Abd w/ multiple pulmonary, hepatic metastases, omental mass, 7.8cm left ovarian mass  -- CT Chest performed 1/8 w/ innumerable pulmonary nodules  w/ large confluent masses at bilateral bases  -- Gyn Onc consulted, plan discussed  -Oncology consult, recommendations reviewed, pending results of biopsy  - US pelvis unremarkable   -Status post US guided biopsy of liver 1/9/2024, pathology pending  -Continue Norco, Dilaudid as needed for pain  -1/10: Based on patient's tenuous clinical status, may require first round of chemotherapy as inpatient per oncology, pending initial pathology results  - MRI brain neg for mets     Hyponatremia-resolved   Possible SIADH   -- likely hypovolemic in the setting of poor PO intake, nausea/vomiting  -- Na 126 on admission, s/p 1L NS bolus  - Sodium correcting appropriately, change BMP to BID  --resolved s/p tolvaptan x1 1/12, change mIVF to TKO      Acute on Chronic Anemia  -- stable  -- recent baseline Hgb 7.7-8.0, 7.0 on admission  -- no e/o active bleeding or hemodynamic instability  -- 1  unit PRBC given 1/9 with minimal response, additional unit ordered today  -- Iron studies consistent with deficiency, Venofer ordered  - CTM, transfuse to maintain goal hemoglobin >7.0  - GI consulted due to persistent anemia, elevated CEA levels  -- EGD/Colonoscopy w/o active bleeding or clear e/o malignancy, biopsies taken, path pending; no further interventions per GI   -- 1/14: Hgb now stable >48 hours, resume lovenox for dvt prophylaxis     LE edema  - pt net + 7.8 L since admission   - stop IVFs  - check BNP and TTE     Leukocytosis   - significant but stable  - procal 0.65  - lactate normalized   - cont empiric zosyn (1/15- )  - f/u Bcx's  - check sputum cx  - check UA     DVT Mechanical Prophylaxis:   SCDs,        DVT Pharmacologic Prophylaxis   Medication    enoxaparin (Lovenox) 40 MG/0.4ML SUBQ injection 40 mg               Code Status: FULL      Dispo: inpatient care in the ICU; EVA >2 midnights    01/16/24 1309 -- 119 30 Abnormal  -- 94 % -- -- -- EO   01/16/24 1200 98 °F (36.7 °C) 113 28 Abnormal  126/62 95 % -- Bi-PAP -- JU   01/16/24 1112 -- 105 19 -- 96 % -- -- -- EO   01/16/24 1100 -- 114 33 Abnormal  136/61 97 % -- -- -- JU   01/16/24 1000 -- 106 24 117/51 96 % -- Bi-PAP -- JU   01/16/24 0905 -- 120 33 Abnormal  -- 96 % -- Bi-PAP -- EO   01/16/24 0900 -- 123 Abnormal  40 Abnormal  159/99 Abnormal  96 % -- -- -- JU   01/16/24 0800 98 °F (36.7 °C) 120 30 Abnormal  135/66 88 % Abnormal  -- Nasal cannula 3 L/min JU   01/16/24 0700 -- 103 13 95/52 92 % -- -- -- TE   01/16/24 0600 -- 105 15 111/54 93 % -- -- -- TE   01/16/24 0500 -- 100 15 111/68 92 % -- -- -- TE   01/16/24 0400 98.1 °F (36.7 °C) 99 14 93/52 94 % -- Nasal cannula 3 L/min TE   01/16/24 0300 -- 100 14 103/49 94 % -- -- -- TE   01/16/24 0200 -- 103 15 96/50 94 % -- -- -- TE   01/16/24 0100 -- 115 21 115/58 93 % -- -- 3 L/min TE   01/16/24 0000 98.1 °F (36.7 °C) 114 21 113/51 92 % -- Simple mask 4 L/min TE

## 2024-01-16 NOTE — DIETARY NOTE
[facility]   CLINICAL NUTRITION    Pt transferred to ICU last night d/t increased WOB after MRI. Pt is lethargic this AM (likely r/t anesthesia from MRI yesterday) but less SOB and on 6LNC. Discussed that pt with minimal nutrition during hospital stay (8 days). Calorie count ordered this AM but then pt made NPO with new plan to initiate TF. Received consult for TF recs once DHT placed. Will provide TF recommendations below.    Nutrition Intervention:  ADAT as mental status improves. Discontinue calorie count until PO resumes.  Once DHT placed and confirmed in correct position, initiate Jevity 1.5 at 20 ml/hr and advance 20 ml/hr q4hrs to GOAL: Jevity 1.5 at 50 ml/hr.  Goal TF to provide 1800 kcal, 77 gm protein, 912 ml free water.  Recommend  ml q6hrs; TF + FWF = 1812 ml total free water.    Will continue to monitor and follow up as appropriate.    Marzena Munguia RD, LDN, CNSC  Clinical Dietitian  Spectra: [phone]

## 2024-01-17 PROBLEM — G93.40 ENCEPHALOPATHY: Status: ACTIVE | Noted: 2024-01-01

## 2024-01-17 NOTE — CONSULTS
[facility]  Report of Psychiatric Consultation    Alysia Campos Patient Status:  Inpatient    7/15/1970 MRN [id number]   Location [facility] 4SW-A Attending Maikel Valera,    Hosp Day # 9 PCP Renetta Santos DO     Date of Admission: 24  Date of Consult: 24  Reason for Consultation: Input on delirium inducing meds, altered mental status    Impression:  Primary Psychiatric Diagnosis:  Acute delirium/encephalopathy due to acute hypoxic resp failure, severe pain, meds (ie anesthesia on 1/15, Dilaudid, precedex gtt, haldol, ativan), elevated ammonia, +/- poor sleep quality. Brain MRI without evidence of mets or stroke.     Anxiety due to general medical condition.     Pertinent Medical Diagnoses:  Metastatic cancer (liver and lung mets), possibly from ovarian primary. Acute hypoxic resp failure. Anemia.    Recommendations:  1) Taper down precedex gtt as able.    2) Start Ativan 1mg IV every 4hrs PRN anxiety/agitation/insomnia. 1mg IV helped calm her per RN. The 1mg oral was ineffective. Use judiciously since benzos will often worsen delirium.     3) Start Haldol 2mg IV every 4 hrs PRN agitation/psychosis. The 1mg IV did not help with agitation per report.    4) Talked with the palliative service who will manage the opioids. Pain has to be treated, but the opioids will worsen her delirium. It will be a fine balance- treating pain while being judicious to prevent worsening resp failure.     5) Recommend a family conference with all the services. I do not think there is a way to treat her pain and alleviate suffering without her being persistently delirious. Morphine equivalents are above 100 per day. Family wants her to get chemo, but this may not be possible due to her resp status and the need for sedating meds to prevent suffering.     Corey Kiran MD  2024  3:59 PM    History of Present Illness:  52 yo female presented to the ED on 24 with cough and N/V and diarrhea. She was found to  have a left ovarian mass (up to 8 cm in maximal dimension) and liver and lung lesions concerning for metastatic cancer. She had a liver lesion biopsy on 1/11/24 with results pending.     She c/o severe abdominal pain and was treated with a Dilaudid PCA. As the continuous drip was increased to 0.4mg IV every hr, she developed worsening confusion and delirium and was having visual hallucinations. Brain MRI just [facility] on 1/12 showed no acute stroke. She was given Seroquel 25mg nightly on 1/14/24 and 1/15/24. She developed acute hypoxic resp failure and was transferred to the ICU on 1/15/24 and required NIPPV. She had a Brain MRI with and without contrast on 1/15 that revealed no stroke or evidence of brain mets.     She has been alternating between lethargy and agitation and c/o severe pain when awake. She was started on Precedex gtt on 1/16. She remains on the Dilaudid PCA and is receiving boluses (0.4mg IV prn x 5 the past 24 hrs, also got 2mg oral x 3 the past 24 hrs). She is getting over 100 morphine equivalents of morphine daily for pain control. She also received Haldol 1mg IV at 14;30 that was ineffective and Ativan 1mg IV at 15:34 that was effective and helped with her anxiety and agitation. Ammonia was found to be mildly elevated at 61 and 55.     Per conversation with her 2 daughters, she has been very confused and either drowsy or agitated and anxious. She has been yelling out in pain when awake. They are wondering when she can get chemo. They want her to be more awake and alert, but understand that pain meds need to be given to alleviate her suffering. She got Dilaudid IV prn, Haldol IV prn, and Ativan IV prn this afternoon and is somnolent. She is unable to answer questions at this time .    Past Psychiatric History: Daughters describe generalized anxiety but no official diagnosis.    Substance Use History: None    Social and Developmental History: She has 2 daughters and 1 son. Her son is POA.     Past  Medical History:   Diagnosis Date    Anemia     Migraines      Past Surgical History:   Procedure Laterality Date    COLONOSCOPY N/A 1/11/2024    Procedure: .;  Surgeon: Ubaldo Morton MD;  Location:  ENDOSCOPY     Family History   Problem Relation Age of Onset    Breast Cancer Mother 60 - 69      reports that she has never smoked. She has never used smokeless tobacco. She reports that she does not currently use alcohol. She reports that she does not use drugs.    Allergies:  Allergies   Allergen Reactions    Mold TINITUS       Medications:    Current Facility-Administered Medications:     sennosides (Senokot) tab 8.6 mg, 8.6 mg, Oral, BID PRN    LORazepam (Ativan) tab 1 mg, 1 mg, Oral, Q4H PRN    dexmedeTOMIDine (Precedex) 800 mcg in sodium chloride 0.9% 100 mL infusion, 0.2-1.5 mcg/kg/hr (Dosing Weight), Intravenous, Continuous    lactulose (CHRONULAC) 10 GM/15ML solution 20 g, 20 g, Oral, TID    dextrose 10% infusion (TPN no rate), , Intravenous, Continuous PRN    pancrelipase (Lip-Prot-Amyl) (Zenpep) DR particles cap 10,000 Units, 10,000 Units, Per G Tube, PRN **AND** sodium bicarbonate tab 325 mg, 325 mg, Per G Tube, PRN    HYDROmorphone in sodium chloride 0.9% (Dilaudid) 20 mg/100mL PCA premix, , Intravenous, Continuous    HYDROmorphone HCl (DILAUDID) oral liquid 2 mg, 2 mg, Per NG Tube, Q3H PRN    norepinephrine (Levophed) 8 mg in dextrose 5% 250 mL infusion, 0.5-30 mcg/min, Intravenous, Continuous    piperacillin-tazobactam (Zosyn) 3.375 g in dextrose 5% 100 mL IVPB-ADDV, 3.375 g, Intravenous, Q8H    enoxaparin (Lovenox) 40 MG/0.4ML SUBQ injection 40 mg, 40 mg, Subcutaneous, Q24H    tamsulosin (Flomax) cap 0.4 mg, 0.4 mg, Oral, Nightly    benzonatate (Tessalon) cap 100 mg, 100 mg, Oral, Q8H    sodium chloride 0.9% infusion, , Intravenous, Continuous    HYDROmorphone 0.4 mg BOLUS FROM PCA, 0.4 mg, Intravenous, Q30 Min PRN    naloxone (Narcan) 0.4 MG/ML injection 0.08 mg, 0.08 mg, Intravenous, Q5 Min  PRN    acetaminophen (Tylenol) tab 650 mg, 650 mg, Oral, Q8H    guaiFENesin ER (Mucinex) 12 hr tab 600 mg, 600 mg, Oral, BID    multivitamin (Centrum) chewable tab (Adult) 1 tablet, 1 tablet, Oral, Daily    ipratropium-albuterol (Duoneb) 0.5-2.5 (3) MG/3ML inhalation solution 3 mL, 3 mL, Nebulization, Q6H PRN    dextromethorphan-guaiFENesin (Robitussin-DM)  mg/5mL oral solution 5 mL, 5 mL, Oral, Q6H PRN    ondansetron (Zofran) 4 MG/2ML injection 4 mg, 4 mg, Intravenous, Q6H PRN    prochlorperazine (Compazine) 10 MG/2ML injection 5 mg, 5 mg, Intravenous, Q8H PRN    Review of Systems   Unable to perform ROS: Mental status change     Mental Status Exam:     Objective      Vitals:    01/17/24 1500   BP: (!) 166/82   Pulse: (!) 125   Resp: (!) 33   Temp:      Appearance: fair grooming  Behavior: does not awake to voice, tachypneic even while she is asleep  Gait: N/A    Speech: unable to assess    Mood: unable to assess  Affect: calm while asleep    Thought process: unable to assess  Thought content: unable to assess    Orientation: unable to assess  Attention and Concentration: unable to assess  Memory:  unable to assess  Language: unable to assess  Fund of Knowledge: unable to assess    Insight: unable to assess  Judgment: unable to assess    Laboratory Data:  Lab Results   Component Value Date    WBC 27.4 01/17/2024    HGB 7.5 01/17/2024    HCT 26.0 01/17/2024    .0 01/17/2024    CREATSERUM 0.97 01/17/2024    BUN 33 01/17/2024     01/17/2024    K 3.4 01/17/2024     01/17/2024    CO2 27.0 01/17/2024     01/17/2024    CA 9.8 01/17/2024    PGLU 147 01/17/2024       STUDIES:    1/15/24   PROCEDURE:  MRI BRAIN (W+WO) (CPT=70553)     COMPARISON:  [facility] , MR, MRI STROKE BRAIN DWI ONLY(NO IV)(CPT=70551), 1/12/2024, 4:23 PM.     INDICATIONS:  diplopia with diffusely metastatic disease; evaluate for mets     TECHNIQUE:  MRI of the brain was performed with multi-planar T1, T2-weighted images with  FLAIR sequences and diffusion weighted images without and with infusion.     PATIENT STATED HISTORY:(As transcribed by Technologist)  Diplopia with diffusely metastatic disease, eval for mets      CONTRAST USED:  20 mL of Dotarem     FINDINGS:    INTRACRANIAL:  There are no focal parenchymal brain abnormalities.  Diffusion weighted imaging was performed and is unremarkable.  There is no evidence for acute infarction.  There is no evidence of hemorrhage or mass lesion.  Intracranial flow voids are   present.  VENTRICLES/SULCI:   Ventricles and sulci are normal in caliber.  There are no extra-axial fluid collections.  There is no midline shift.  SINUSES/ORBITS:       The visualized paranasal sinuses are clear.  The orbits are unremarkable.  MASTOIDS:       The mastoids are clear.    CONCLUSION:    1. No abnormal enhancement to suggest metastases.

## 2024-01-17 NOTE — PROGRESS NOTES
PSYCH CONSULT    Date of Admission: 1/8/24  Date of Consult: 1/17/24  Reason for Consultation: Input on delirium inducing meds, altered mental status    Impression:  Primary Psychiatric Diagnosis:  Acute delirium/encephalopathy due to acute hypoxic resp failure, severe pain, meds (ie anesthesia on 1/15, Dilaudid, precedex gtt, haldol, ativan), elevated ammonia, +/- poor sleep quality. Brain MRI without evidence of mets or stroke.     Anxiety due to general medical condition.     Pertinent Medical Diagnoses:  Metastatic cancer (liver and lung mets), possibly from ovarian primary. Acute hypoxic resp failure. Anemia.    Recommendations:  1) Taper down precedex gtt as able.    2) Start Ativan 1mg IV every 4hrs PRN anxiety/agitation/insomnia. 1mg IV helped calm her per RN. The 1mg oral was ineffective. Use judiciously since benzos will often worsen delirium.     3) Start Haldol 2mg IV every 4 hrs PRN agitation/psychosis. The 1mg IV did not help with agitation per report.    4) Talked with the palliative service who will manage the opioids. Pain has to be treated, but the opioids will worsen her delirium. It will be a fine balance- treating pain while being judicious to prevent worsening resp failure.     5) Recommend a family conference with all the services. I do not think there is a way to treat her pain and alleviate suffering without her being persistently delirious. Morphine equivalents are above 100 per day. Family wants her to get chemo, but this may not be possible due to her resp status and the need for sedating meds to prevent suffering.     Full note to follow.    Dr. Corey Kiran

## 2024-01-17 NOTE — PROGRESS NOTES
[facility]  Palliative Care Progress Note    Alysia Campos Patient Status:  Inpatient    7/15/1970 MRN [id number]   Formerly [facility] 4NW-A Attending Harshal Aponte MD   Hosp Day # 9 PCP Renetta Santos DO     History/Other:       Alysia Campos is a 53 year old female with migraines who was admitted on 2024 for cough, N/V/D. Work up in our hospital revealed CT scan with lung and liver nodules, an omental mass, and a left ovarian 8 cm mass and labs significant for iron deficiency anemia (Hgb 7.0), leukocytosis, elevated AST/Alk phos, hyponatremia (Na 124), and elevated tumor markers //CEA. Liver bx on  with documented prelim results per oncology that is suggestive of malignancy. Anemia s/p 2 units pRBCs and venofer x3 days.      Consult ordered by:: Dr. Mckeon for evaluation of Palliative Care needs and Uncontrolled symptoms.    Allergies:  Allergies   Allergen Reactions    Mold TINITUS     Medications:     Current Facility-Administered Medications:     dexmedeTOMIDine (Precedex) 800 mcg in sodium chloride 0.9% 100 mL infusion, 0.2-1.5 mcg/kg/hr (Dosing Weight), Intravenous, Continuous    lactulose (CHRONULAC) 10 GM/15ML solution 20 g, 20 g, Oral, TID    dextrose 10% infusion (TPN no rate), , Intravenous, Continuous PRN    pancrelipase (Lip-Prot-Amyl) (Zenpep) DR particles cap 10,000 Units, 10,000 Units, Per G Tube, PRN **AND** sodium bicarbonate tab 325 mg, 325 mg, Per G Tube, PRN    HYDROmorphone in sodium chloride 0.9% (Dilaudid) 20 mg/100mL PCA premix, , Intravenous, Continuous    HYDROmorphone HCl (DILAUDID) oral liquid 2 mg, 2 mg, Per NG Tube, Q3H PRN    norepinephrine (Levophed) 8 mg in dextrose 5% 250 mL infusion, 0.5-30 mcg/min, Intravenous, Continuous    LORazepam Intensol 2 mg/mL oral concentrated solution 1 mg, 1 mg, Sublingual, Q4H PRN    piperacillin-tazobactam (Zosyn) 3.375 g in dextrose 5% 100 mL IVPB-ADDV, 3.375 g, Intravenous, Q8H    enoxaparin (Lovenox) 40 MG/0.4ML  SUBQ injection 40 mg, 40 mg, Subcutaneous, Q24H    tamsulosin (Flomax) cap 0.4 mg, 0.4 mg, Oral, Nightly    busPIRone (Buspar) tab 5 mg, 5 mg, Oral, BID    sennosides (Senokot) tab 8.6 mg, 8.6 mg, Oral, BID    benzonatate (Tessalon) cap 100 mg, 100 mg, Oral, Q8H    sodium chloride 0.9% infusion, , Intravenous, Continuous    HYDROmorphone 0.4 mg BOLUS FROM PCA, 0.4 mg, Intravenous, Q30 Min PRN    naloxone (Narcan) 0.4 MG/ML injection 0.08 mg, 0.08 mg, Intravenous, Q5 Min PRN    acetaminophen (Tylenol) tab 650 mg, 650 mg, Oral, Q8H    guaiFENesin ER (Mucinex) 12 hr tab 600 mg, 600 mg, Oral, BID    multivitamin (Centrum) chewable tab (Adult) 1 tablet, 1 tablet, Oral, Daily    ipratropium-albuterol (Duoneb) 0.5-2.5 (3) MG/3ML inhalation solution 3 mL, 3 mL, Nebulization, Q6H PRN    dextromethorphan-guaiFENesin (Robitussin-DM)  mg/5mL oral solution 5 mL, 5 mL, Oral, Q6H PRN    ondansetron (Zofran) 4 MG/2ML injection 4 mg, 4 mg, Intravenous, Q6H PRN    prochlorperazine (Compazine) 10 MG/2ML injection 5 mg, 5 mg, Intravenous, Q8H PRN    Subjective      Interval events:  intermittently hypotensive; levo held. Precedex increased and posey vest placed.     When I entered the room, the patient was sleeping but easy to wake. Daughter present at bedside.     Symptom assessment: AMS, hallucinations (visual and auditory), poor sleep, pain, SOB, cough.   Pain assessment:   24 hour OME total: total 12.56mg  (continuous 9.56mg, pt admin 0.2mg, CB 2.8mg)   PLUS PO dilaudid 2mg x2  12 hour use: total 6.4mg  (continuous 4.8mg, pt admin 0mg, CB 1.6mg)   Current pain: severe. Pt has a difficult time now using numeric scale and localizing pain     See summary of discussion below.     Review of Systems:  Pertinent items are noted in subjective.  Bowel Movement         1/17/2024  0200             Stool Count Calculated for I/O: 1          Objective:     Vital Signs:  Blood pressure 103/53, pulse 92, temperature 97.9 °F (36.6 °C),  temperature source Temporal, resp. rate 26, height 5' 2\" (1.575 m), weight 180 lb 1.9 oz (81.7 kg), SpO2 98%.  Body mass index is 32.94 kg/m².  Present Level of pain: severe   Non-verbal signs of pain present: No  Current Palliative performance scale PPSv2 (%): 45    Physical Exam:  General: Lethargic. In mild respiratory distress.    HEENT: AT/NC. No gross focal deficits.  Lungs: Increased effort on Bipap  Abdomen: distended.    Extremities: deferred   Neurologic: Aox2, lethargic    Psychiatric: Mood anxious, mood labile. Resting to panicked easily   Skin: pale     Pt asking for water and bipap temporarily removed after ABG by RT. Within a minute, pt's SpO2 was ~85% w/ tachypnea. Bipap replaced and pt repositioned.     Results:     Hematology:  Lab Results   Component Value Date    WBC 27.4 (H) 01/17/2024    HGB 7.5 (L) 01/17/2024    HCT 26.0 (L) 01/17/2024    .0 01/17/2024     Coags:  Lab Results   Component Value Date    INR 1.07 01/09/2024    PTT 28.4 01/09/2024     Chemistry:  Lab Results   Component Value Date    CREATSERUM 0.97 01/17/2024    BUN 33 (H) 01/17/2024     (H) 01/17/2024    K 3.4 (L) 01/17/2024     (H) 01/17/2024    CO2 27.0 01/17/2024     (H) 01/17/2024    CA 9.8 01/17/2024    ALB 2.9 (L) 01/16/2024    ALKPHO 193 (H) 01/16/2024    BILT 1.0 01/16/2024    TP 6.6 01/16/2024     (H) 01/16/2024    ALT 46 01/16/2024    MG 2.7 (H) 01/15/2024    PHOS 3.2 01/16/2024     Imaging:  XR CHEST AP PORTABLE  (CPT=71045)    Result Date: 1/16/2024  CONCLUSION:    Feeding tube has been placed.  The metal tip is excluded from the radiograph, but the portion of catheter visualized extends below the diaphragm into the left upper quadrant, and therefore the feeding tube reaches at least the stomach, although the exact position is not determined.  If exact position needed, consider x-ray of the abdomen.  Redemonstration elevated right diaphragm, and reticular nodular infiltrate throughout  the lungs.  The heart is mostly obscured but appears enlarged. LOCATION:  [facility]      Dictated by (CST): Wilman London MD on 1/16/2024 at 1:45 PM     Finalized by (CST): Wilman London MD on 1/16/2024 at 1:46 PM       XR CHEST AP PORTABLE  (CPT=71045)    Result Date: 1/16/2024  CONCLUSION:  No significant interval change.   LOCATION:  [id number]      Dictated by (CST): Silviano Motta MD on 1/16/2024 at 6:47 AM     Finalized by (CST): Silviano Motta MD on 1/16/2024 at 6:48 AM       XR CHEST AP PORTABLE  (CPT=71045)    Result Date: 1/15/2024  CONCLUSION:  1. When compared to most recent chest radiograph dated 1/8/2024, increasing bilateral interstitial and nodular opacities consistent with metastases.  Superimpose infectious/edematous process cannot be excluded.   LOCATION:  [facility]      Dictated by (CST): Deyanira Whipple MD on 1/15/2024 at 6:56 PM     Finalized by (CST): Deyanira Whipple MD on 1/15/2024 at 7:06 PM       MRI BRAIN (W+WO) (CPT=70553)    Result Date: 1/15/2024  CONCLUSION:  1. No abnormal enhancement to suggest metastases.   LOCATION:  [facility]    Dictated by (CST): Deyanira Whipple MD on 1/15/2024 at 5:40 PM     Finalized by (CST): eDyanira Whipple MD on 1/15/2024 at 5:44 PM           Summary of Discussion     Spoke with Carmen. Provided support.   She had several oncology related questions; deferred to oncology.   Carmen is concerned that pt is not resting and is just sedated, leading to hallucinations and overall decline.     Advance Care Planning counseling and discussion:   HC POA Documentation Completed--Document in Epic.   Arya Madden.   POLST FORM Does not wish to complete at this time  Full Code      Assessment and Recommendations       Principal Problem:    Metastatic malignant neoplasm, unspecified site (HCC)  Active Problems:    Hypokalemia    Hyponatremia    Leukocytosis, unspecified type    Anemia, unspecified type    Cancer related pain    Palliative care by specialist    Goals of care, counseling/discussion    Goals of  care:  ongoing as pt remains in the information gathering stage    Awaiting further information and bx results; possibly to start chemo tomorrow   Family acknowledges mental and physical decline since admission.   Code Status: Full Code   POA is patt Madden.     Symptoms  #cancer related pain  -tylenol scheduled   - IV dilaudid PCA    - continuous 0.4mg     -pt admin 0mg as pt unable to push button --> PRN PO dilaudid 2mg by NG   -clinician bolus 0.4mg Q30 minutes over 4 hours for severe pain   -Abd US 1/14 w/o pocket of ascites   -despite AMS, pt is complaining of constant pain. MSER is not an option and fentanyl TD not easily titratable     #Anxiety   -buspar 5mg BID started on 1/13  -ICU to add precedex in light of bipap needs     #Constipation prevention  -on lactulose for concerns of hepatic encephalopathy; rectal tube in place  -KUB 1/13 w/o significant stool burden     #nausea   -PRN zofran or compazine     Discussed today's visit with Family and ICU RN .    Palliative Care Follow Up: Palliative care team will Continue to follow while inpatient.  Palliative care follow up outpatient: is indicated but not currently enrolled in palliative care program.    Thank you for allowing Palliative Care services to participate in the care of Alysia Campos.    A total of 50 minutes were spent on this consult, which included all of the following: chart review, direct face to face contact, history taking, physical examination, counseling and coordinating care, and documentation.     Jessie Ngo MD  1/17/2024  10:55 AM   Palliative Care Services    The 21st Century Cures Act makes medical notes like these available to patients in the interest of transparency. Please be advised this is a medical document. Medical documents are intended to carry relevant information, facts as evident, and the clinical opinion of the practitioner. The medical note is intended as peer to peer communication and may appear blunt or direct. It is  written in medical language and may contain abbreviations or verbiage that are unfamiliar.      Addendum: pt weaned from bipap to NC. Buspar discontinued this AM. Revisited around 1:45 pm. Pt speaking loudly/yelling out. RN and ICU attending also visited w/ pt. Daughters expressed significant concern. Pt removed her DHT. [place] remains in place. She has been on zosyn since 1/15. Lactulose TID since yesterday AM. Remains on precedex. She has received dilaudid 0.4mg CB x4 and PO dilaudid 2mg x1 since 7 AM. Pt states that her pain is severe; required several leading questions to understand her pain. Significant TTP throughout abdomen. No s/s of allodynia or hyperalgesia. Ativan 1mg given at 1115 AM. ICU planning on dose of haldol. Provided support to family, but reasonably so, they are upset and distraught at the situation. Dr. Kiran consulted. Will defer pain med changes until after Dr. Kiran recommendations received.  An additional 30 minutes spent on consult.   Jessie Ngo MD, 01/17/24, 2:16 PM    Addendum: Spoke with Dr. Kiran, appreciate recommendations. Will likely need escalation of pain meds, but this will contribute to delirium, sedation, and respiratory compromise.   LVM for Dr. Mcarthur given significant events throughout the day.   Spoke with pt's son and daughter outside of the room. Expressed concern that pt's delirium is both the result of under treated pain and medications. Given significant tumor burden and respiratory compromise and developing delirium, I recommended hospice. Pt has declined significantly since admission and family recognizes this. I wish there was something else we could do for this family and Alysia. After lengthy discussion, family wants to keep continuous at the same rate, trial haldol/ativan, and increase bolus. Pt has tolerated 0.4mg bolus well, but is not having significant relief. Will increase to 0.6mg every hour PRN. Clear instructions given to Mayra PENA. Family wishes for pt to  remain full code. They are hoping to talk with Dr. Mcarthur in the morning regarding recommendations. Provided support.   An additional 35 minutes spent on encounter for a total of 115 minutes.   Jessie Ngo MD, 01/17/24, 5:47 PM

## 2024-01-17 NOTE — OCCUPATIONAL THERAPY NOTE
Attempted to see pt for skilled OT services this date. Pt is currently not appropriate for skilled OT per RN. Will re-attempt tomorrow as schedule allows. Courtney Khan, 01/17/24, 8:53 AM

## 2024-01-17 NOTE — PROGRESS NOTES
[facility]  Follow up note    Alysia Campos Patient Status:  Inpatient    7/15/1970 MRN [id number]   Location [facility] 4NW-A Attending Harshal Aponte MD   Hosp Day # 9 PCP Renetta Santos,      Interval History--  Patient was seen this AM, had difficulty tolerating BiPaP yesterday.     History:  Past Medical History:   Diagnosis Date    Anemia     Migraines      Past Surgical History:   Procedure Laterality Date    COLONOSCOPY N/A 2024    Procedure: .;  Surgeon: Ubaldo Morton MD;  Location:  ENDOSCOPY     Family History   Problem Relation Age of Onset    Breast Cancer Mother 60 - 69      reports that she has never smoked. She has never used smokeless tobacco. She reports that she does not currently use alcohol. She reports that she does not use drugs.    Allergies:  Allergies   Allergen Reactions    Mold TINITUS       Medications:    Current Facility-Administered Medications:     dexmedeTOMIDine (Precedex) 800 mcg in sodium chloride 0.9% 100 mL infusion, 0.2-1.5 mcg/kg/hr (Dosing Weight), Intravenous, Continuous    lactulose (CHRONULAC) 10 GM/15ML solution 20 g, 20 g, Oral, TID    dextrose 10% infusion (TPN no rate), , Intravenous, Continuous PRN    pancrelipase (Lip-Prot-Amyl) (Zenpep) DR particles cap 10,000 Units, 10,000 Units, Per G Tube, PRN **AND** sodium bicarbonate tab 325 mg, 325 mg, Per G Tube, PRN    HYDROmorphone in sodium chloride 0.9% (Dilaudid) 20 mg/100mL PCA premix, , Intravenous, Continuous    HYDROmorphone HCl (DILAUDID) oral liquid 2 mg, 2 mg, Per NG Tube, Q3H PRN    norepinephrine (Levophed) 8 mg in dextrose 5% 250 mL infusion, 0.5-30 mcg/min, Intravenous, Continuous    LORazepam Intensol 2 mg/mL oral concentrated solution 1 mg, 1 mg, Sublingual, Q4H PRN    piperacillin-tazobactam (Zosyn) 3.375 g in dextrose 5% 100 mL IVPB-ADDV, 3.375 g, Intravenous, Q8H    enoxaparin (Lovenox) 40 MG/0.4ML SUBQ injection 40 mg, 40 mg, Subcutaneous, Q24H    tamsulosin (Flomax) cap 0.4  mg, 0.4 mg, Oral, Nightly    busPIRone (Buspar) tab 5 mg, 5 mg, Oral, BID    sennosides (Senokot) tab 8.6 mg, 8.6 mg, Oral, BID    benzonatate (Tessalon) cap 100 mg, 100 mg, Oral, Q8H    sodium chloride 0.9% infusion, , Intravenous, Continuous    HYDROmorphone 0.4 mg BOLUS FROM PCA, 0.4 mg, Intravenous, Q30 Min PRN    naloxone (Narcan) 0.4 MG/ML injection 0.08 mg, 0.08 mg, Intravenous, Q5 Min PRN    acetaminophen (Tylenol) tab 650 mg, 650 mg, Oral, Q8H    guaiFENesin ER (Mucinex) 12 hr tab 600 mg, 600 mg, Oral, BID    multivitamin (Centrum) chewable tab (Adult) 1 tablet, 1 tablet, Oral, Daily    ipratropium-albuterol (Duoneb) 0.5-2.5 (3) MG/3ML inhalation solution 3 mL, 3 mL, Nebulization, Q6H PRN    dextromethorphan-guaiFENesin (Robitussin-DM)  mg/5mL oral solution 5 mL, 5 mL, Oral, Q6H PRN    ondansetron (Zofran) 4 MG/2ML injection 4 mg, 4 mg, Intravenous, Q6H PRN    prochlorperazine (Compazine) 10 MG/2ML injection 5 mg, 5 mg, Intravenous, Q8H PRN    Review of Systems:  A 10-point review of systems was done with pertinent positives and negatives per the HPI.    Physical Exam:   Blood pressure 118/70, pulse 97, temperature 98.9 °F (37.2 °C), temperature source Temporal, resp. rate (!) 30, height 5' 2\" (1.575 m), weight 180 lb 1.9 oz (81.7 kg), SpO2 92%.   General: Patient is alert; appeared uncomfortable, but improved after she pressed for her pain med   Chest: Clear to auscultation. + cough    Heart: Regular rate     Abdomen: distended.   Extremities: + b/l LE edema    Neurological: Grossly intact.          Laboratory Data:    Lab Results   Component Value Date    WBC 27.4 01/17/2024    HGB 7.5 01/17/2024    HCT 26.0 01/17/2024    .0 01/17/2024    CREATSERUM 0.87 01/16/2024    BUN 34 01/16/2024     01/16/2024    K 3.6 01/16/2024     01/16/2024    CO2 28.0 01/16/2024     01/16/2024    CA 10.3 01/16/2024       Imaging:  XR CHEST AP PORTABLE  (CPT=71045)    Result Date:  1/16/2024  CONCLUSION:    Feeding tube has been placed.  The metal tip is excluded from the radiograph, but the portion of catheter visualized extends below the diaphragm into the left upper quadrant, and therefore the feeding tube reaches at least the stomach, although the exact position is not determined.  If exact position needed, consider x-ray of the abdomen.  Redemonstration elevated right diaphragm, and reticular nodular infiltrate throughout the lungs.  The heart is mostly obscured but appears enlarged. LOCATION:  [facility]      Dictated by (CST): Wilman London MD on 1/16/2024 at 1:45 PM     Finalized by (CST): Wilman London MD on 1/16/2024 at 1:46 PM       XR CHEST AP PORTABLE  (CPT=71045)    Result Date: 1/16/2024  CONCLUSION:  No significant interval change.   LOCATION:  [id number]      Dictated by (CST): Silviano Motta MD on 1/16/2024 at 6:47 AM     Finalized by (CST): Silviano Motta MD on 1/16/2024 at 6:48 AM       XR CHEST AP PORTABLE  (CPT=71045)    Result Date: 1/15/2024  CONCLUSION:  1. When compared to most recent chest radiograph dated 1/8/2024, increasing bilateral interstitial and nodular opacities consistent with metastases.  Superimpose infectious/edematous process cannot be excluded.   LOCATION:  [facility]      Dictated by (CST): Deyanira Whipple MD on 1/15/2024 at 6:56 PM     Finalized by (CST): Deyanira Whipple MD on 1/15/2024 at 7:06 PM       MRI BRAIN (W+WO) (CPT=70553)    Result Date: 1/15/2024  CONCLUSION:  1. No abnormal enhancement to suggest metastases.   LOCATION:  [facility]    Dictated by (CST): Deyanira Whipple MD on 1/15/2024 at 5:40 PM     Finalized by (CST): Deyanira Whipple MD on 1/15/2024 at 5:44 PM       US ABDOMEN LIMITED (CPT=76705)    Result Date: 1/14/2024  CONCLUSION:  Limited ultrasound of the abdomen was performed evaluating all 4 quadrants of the abdomen and pelvis for ascites.   There is trace ascites identified adjacent to the liver.  No large pocket of ascites demonstrated.   LOCATION:  [id number]    Dictated by (CST): Sherita Whitman MD on 1/14/2024 at 1:31 PM     Finalized by (CST): Sherita Whitman MD on 1/14/2024 at 1:32 PM       XR ABDOMEN (1 VIEW) (CPT=74018)    Result Date: 1/13/2024  CONCLUSION:  No acute disease.    LOCATION:  [facility]   Dictated by (CST): Nomi Dawson MD on 1/13/2024 at 6:58 PM     Finalized by (CST): Nomi Dawson MD on 1/13/2024 at 6:59 PM       MRI STROKE BRAIN DWI ONLY(NO IV)(CPT=70551)    Result Date: 1/12/2024  CONCLUSION:  Severely limited study with motion artifact on a DWI only study demonstrating no definite evidence of infarct.   LOCATION:  [facility]   Dictated by (CST): Nomi Dawson MD on 1/12/2024 at 4:56 PM     Finalized by (CST): Nomi Dawson MD on 1/12/2024 at 4:59 PM       CT CHEST PE AORTA (IV ONLY) (CPT=71260)    Result Date: 1/10/2024  CONCLUSION:   1. Negative for pulmonary embolism.  2. 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  Larger areas of consolidation involving the lower lobes and lingula may reflect additional sites of metastatic disease versus superimposed pneumonia.  The right lower lobe consolidation is increased in size compared to 01/08/2024.  3. Findings of extensive malignant lymphadenopathy of the thorax.  4. Widespread hepatic metastatic disease.    LOCATION:  [facility]   Dictated by (CST): Kati Davison MD on 1/10/2024 at 2:35 PM     Finalized by (CST): Kati Davison MD on 1/10/2024 at 2:46 PM       US PELVIS W DOPPLER (YDI=77310/62153)    Result Date: 1/9/2024  CONCLUSION:  Large left ovarian mass as described on the recent CT scan measuring up to 8.1 cm in maximal dimension, highly concerning for a left ovarian neoplasm.   LOCATION:  [facility]    Dictated by (CST): Bre Silva DO on 1/09/2024 at 10:16 PM     Finalized by (CST): Bre Silva DO on 1/09/2024 at 10:18 PM       US BIOPSY LIVER (CPT=76942/02284)    Result Date: 1/9/2024  CONCLUSION:  Technically successful ultrasound-guided core biopsy of a  representative right hepatic lobe mass.   LOCATION:  [facility]     Dictated by (CST): Ranulfo Doshi MD on 1/09/2024 at 1:03 PM     Finalized by (CST): Ranulfo Doshi MD on 1/09/2024 at 1:04 PM       CT CHEST (YYJ=41152)    Result Date: 1/8/2024  CONCLUSION:  Innumerable pulmonary masses are noted with large confluent masses at lung bases bilaterally.  There is mediastinal and hilar lymphadenopathy.  Findings are consistent with a diffusely metastatic disease.  LOCATION:     Dictated by (CST): Melvin Adan MD on 1/08/2024 at 7:53 PM     Finalized by (CST): Melvin Adan MD on 1/08/2024 at 7:58 PM       CT ABDOMEN PELVIS IV CONTRAST, NO ORAL (ER)    Result Date: 1/8/2024  CONCLUSION:  1. Findings concerning for metastatic disease.  There are innumerable nodules within the visualized lungs as well as innumerable metastatic lesions throughout the liver.  Additionally, there is an omental base mass within the right lower quadrant and a large mass which appears to be emanating from the left ovary measuring 7.8 cm. 2. There is no discrete evidence of an acute intra-abdominal or pelvic process.   LOCATION:  [id number]   Dictated by (CST): Bre Silva DO on 1/08/2024 at 12:17 PM     Finalized by (CST): Bre Silva DO on 1/08/2024 at 12:25 PM       XR CHEST PA + LAT CHEST (CPT=71046)    Result Date: 1/8/2024  CONCLUSION:  1. Suggestion of multiple bilateral pulmonary nodules.  Recommend CT of the chest for further evaluation.   LOCATION:  [facility]   Dictated by (CST): Deyanira Whipple MD on 1/08/2024 at 10:27 AM     Finalized by (CST): Deyanira Whipple MD on 1/08/2024 at 10:29 AM         Impression and Plan:    Patient Active Problem List   Diagnosis    Hypokalemia    Metastatic malignant neoplasm, unspecified site (HCC)    Hyponatremia    Leukocytosis, unspecified type    Anemia, unspecified type    Cancer related pain    Palliative care by specialist    Goals of care, counseling/discussion       Impression  Metastatic malignancy based on  imaging, unclear primary lesion.   Widely metastatic with lymphangitic spread in lungs, liver metastases, pelvic metastases, ovarian mass.  Liver biopsy was done 1/9 per IR.  Prelim pathology is malignant, but not much further clarification is yet available.   Probably carcinoma based on IHC.  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   She needed one unit PRBCs. Venofer x 3 has completed earlier this week.  Tachycardia, SOB.   CTA negative for PE  Pain control on PCA.  Palliative care following     Plan:  I discussed with the patient and family that currently we are awaiting pathology results to find out etiology but should have a prelim result very soon but I do suspect that it could be a GYN source  I discussed that we may think about chemotherapy tomorrow/Friday pending her shortness of breath. I do suspect there was a component of volume overload causing her shortness of breath so I wanted to have that improve prior to give more fluids with chemotherapy. Will tentatively plan for carboplatin + paclitaxel  MRI Brain was negative   Palliative Care following    Kendell Mcarthur MD

## 2024-01-17 NOTE — PLAN OF CARE
Assumed care of pt at change of shift on AVAPS and both precedex and dilaudid PCA gtts.  Pt awake and alert to self, place, and states she came to the hospital for vomiting. Pt states correct month and year but unsure about exact date.  Pt having frequent runs of nonsustained Afib with RVR, notified APN. Metoprolol IVP ordered and administered with improvement.  Pt very agitated and anxious later in the shift, not responsive to pain management. Pt requiring significant increase in precedex rate.

## 2024-01-17 NOTE — PHYSICAL THERAPY NOTE
Following for PT this date.  Pt not appropriate for intervention at this time, will f/u at a later date.

## 2024-01-17 NOTE — PROGRESS NOTES
[facility]    Progress Note     Alysia Campos Patient Status:  Inpatient    7/15/1970 MRN [id number]   Location [facility] 4NW-A Attending Harshal Aponte MD   Hosp Day # 9 PCP Renetta Santos DO     Follow up for: The primary encounter diagnosis was Metastatic malignant neoplasm, unspecified site (HCC). Diagnoses of Hyponatremia, Leukocytosis, unspecified type, and Anemia, unspecified type were also pertinent to this visit.      Interval History/Subjective:     Pt seen and examined.  Weaned from AVAPS this AM.  C/o feeling more SOB, she continues to have severe pain.  Sleepy but responds to commands.  No fevers.      Vital signs:  Temp:  [97.6 °F (36.4 °C)-98.9 °F (37.2 °C)] 98 °F (36.7 °C)  Pulse:  [] 115  Resp:  [15-40] 26  BP: ()/(42-92) 144/68  SpO2:  [84 %-98 %] 93 %  FiO2 (%):  [30 %] 30 %    Physical Exam:      General: No acute distress. Anxious but more comfortable, nontoxic Appears pale  HEENT: Normocephalic atraumatic. Moist mucous membranes; Sclera anicteric. EOMI. NGT in place.  Neck: Supple, +JVD  Respiratory: Diminished at bilateral bases, otherwise CTA; ;reg resp rate & effort, no wheezes/crackles   Cardiovascular: S1, S2. Tachycardic. No murmurs appreciable   Chest and Back: No tenderness or deformity.  Abdomen: distended with generalized TTP; no guarding/rebound tenderness; +hepatomegaly  Neurologic: Drowsy but arousable to voice; No focal neurological deficits. CNII-XII grossly intact.  Musculoskeletal: Moves all extremities.  Extremities: +LE edema   Integument: No rashes or lesions.   Psychiatric: Appropriate mood and affect.       Diagnostic Data:      Labs:  Recent Labs   Lab 24  0702 01/15/24  0704 01/15/24  2027 24  0503 24  0524   WBC 32.2* 29.6* 34.2* 32.2* 27.4*   HGB 7.6* 7.6* 8.5* 7.7* 7.5*   MCV 76.6* 75.9* 74.9* 74.7* 77.2*   .0 329.0 371.0 304.0 313.0   BAND 6 8 1 5 1       Recent Labs   Lab 24  0704 24  1622 01/15/24  2027  01/16/24  0503 01/16/24  0931 01/16/24  1946 01/17/24  0524   *   < > 96 96 95 107* 131*   BUN 14   < > 25* 26* 28* 34* 33*   CREATSERUM 0.57   < > 0.65 0.71 0.79 0.87 0.97   CA 9.3   < > 10.5* 9.9 10.2* 10.3* 9.8   ALB 3.1*  --  3.0*  3.0* 2.9*  --   --   --    *   < > 141 140 142 146* 146*   K 3.6   < > 4.1 3.8 3.8 3.6 3.4*   CL 99   < > 111 109 111 114* 113*   CO2 21.0   < > 19.0* 25.0 24.0 28.0 27.0   ALKPHO 211*  --  211*  211* 193*  --   --   --    AST 96*  --  120*  120* 117*  --   --   --    ALT 39  --  48  48 46  --   --   --    BILT 0.9  --  1.0  1.0 1.0  --   --   --    TP 7.0  --  6.9  6.9 6.6  --   --   --     < > = values in this interval not displayed.       No results for input(s): \"PTP\", \"INR\" in the last 168 hours.      No results for input(s): \"TROP\", \"CK\" in the last 168 hours.         Imaging: Imaging data reviewed in Epic.    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   Hyponatremia  Acute on Chronic Anemia      Plan    Acute hypoxic respiratory failure   - suspect multifactorial (anesthesia, pulm masses, fluid overload, narcotics)   - CTA chest neg for PE  - s/p IV lasix x 1 on 1/15  - RVP neg  - wean O2 as tolerated     Acute encephalopathy  - suspect due to anesthesia and narcotics  - ABG with pH 7.30, PCO2 49, PO2  102  - ammonia 55  -  alk phose 193  - MRI brain neg for acute pathology   -  consider narcan given high dose opiates on PCA  - consider holding nightly seroquel   - monitor for improvement     Diplopia  -Unclear if secondary to sedation given increased pain requirements  -Given evidence of widely metastatic disease will need to check MRI brain to rule out metastases, pending  -- MRI brain neg for mets.     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   -- CT Abd w/ multiple pulmonary, hepatic metastases, omental mass, 7.8cm left ovarian mass  -- CT Chest performed 1/8 w/ innumerable pulmonary nodules  w/ large confluent masses at bilateral bases  -- Gyn Onc consulted, plan discussed  -Oncology consult, recommendations reviewed, pending results of biopsy  - US pelvis unremarkable   -Status post US guided biopsy of liver 1/9/2024, pathology pending  -Continue Norco, Dilaudid as needed for pain  -1/10: Based on patient's tenuous clinical status, may require first round of chemotherapy as inpatient per oncology, pending initial pathology results  - MRI brain neg for mets  - possible chemo on Friday     Hyponatremia-resolved   Possible SIADH   -- likely hypovolemic in the setting of poor PO intake, nausea/vomiting  -- Na 126 on admission, s/p 1L NS bolus  - Sodium correcting appropriately, change BMP to BID  --resolved s/p tolvaptan x1 1/12, change mIVF to TKO      Acute on Chronic Anemia  -- stable  -- recent baseline Hgb 7.7-8.0, 7.0 on admission  -- no e/o active bleeding or hemodynamic instability  -- 1 unit PRBC given 1/9 with minimal response, additional unit ordered today  -- Iron studies consistent with deficiency, Venofer ordered  - CTM, transfuse to  maintain goal hemoglobin >7.0  - GI consulted due to persistent anemia, elevated CEA levels  -- EGD/Colonoscopy w/o active bleeding or clear e/o malignancy, biopsies taken, path pending; no further interventions per GI   -- 1/14: Hgb now stable >48 hours, resume lovenox for dvt prophylaxis     LE edema  - pt net + 7.8 L since admission   - stop IVFs  - check BNP and TTE    Leukocytosis   - significant but stable  - procal 0.65  - lactate normalized   - cont empiric zosyn (1/15- )  - f/u Bcx's  - check sputum cx  - UA neg    DVT Mechanical Prophylaxis:   SCDs,    DVT Pharmacologic Prophylaxis   Medication    enoxaparin (Lovenox) 40 MG/0.4ML SUBQ injection 40 mg              Code Status: FULL     Dispo: inpatient care in the ICU; EVA >2 midnights    Plan of care discussed with patient and/or family at bedside.    Maikel Valera [facility]   [phone]      This note was created using voice recognition technology. It may include inadvertent transcriptional errors. Any such errors should be contextually interpreted and should not be taken to alter the content or the meaning.     Note to Patient: The 21st Century Cures Act makes medical notes like these available to patients in the interest of transparency. However, be advised this is a medical document. It is intended as peer to peer communication. It is written in medical language and may contain abbreviations or verbiage that are unfamiliar. It may appear blunt or direct. Medical documents are intended to carry relevant information, facts as evident, and the clinical opinion of the practitioner and not intended to be the primary source of your information.  Please refer directly to myself or clinical staff for information regarding plan of care.

## 2024-01-17 NOTE — PROGRESS NOTES
Alysia Campos Patient Status:  Inpatient    7/15/1970 MRN [id number]   [facility] 4SW-A Attending Maikel Valera DO   Hosp Day # 9 PCP Renetta Santos DO     Critical Care Progress Note          Subjective:  Weaned from AVAPS this morning. Awake, oriented to self and place. Appears SOB, moaning  in pain and complaining of increased pain with urination-hunter in place.     Objective:    Medications:   lactulose  20 g Oral TID    piperacillin-tazobactam  3.375 g Intravenous Q8H    enoxaparin  40 mg Subcutaneous Q24H    tamsulosin  0.4 mg Oral Nightly    benzonatate  100 mg Oral Q8H    acetaminophen  650 mg Oral Q8H    guaiFENesin ER  600 mg Oral BID    multivitamin  1 tablet Oral Daily       FiO2 (%):  [30 %] 30 % ,  5 L NC      Intake/Output Summary (Last 24 hours) at 2024 1218  Last data filed at 2024 1200  Gross per 24 hour   Intake 2156.35 ml   Output 1638 ml   Net 518.35 ml       /68   Pulse 115   Temp 98 °F (36.7 °C) (Temporal)   Resp 26   Ht 157.5 cm (5' 2\")   Wt 180 lb 1.9 oz (81.7 kg)   LMP  (LMP Unknown)   SpO2 93%   BMI 32.94 kg/m²     Physical Exam:  General Appearance: Lethargic, conversational dyspnea, moaning in pain with attempting to urinate  Neck: No JVD, neck supple, no adenopathy, trachea midline, no carotid bruits  Lungs: Clear to auscultation bilaterally, respirations unlabored  Heart: Regular rate and rhythm, S1 and S2 normal, no murmur, rub or gallop  Abdomen: Distended, tender to palpation. Hypoactive bowel sounds.  Extremities: Lower Extremities significantly edematous 3+ pitting.   Pulses: 2+ and symmetric all extremities  Skin: Skin color, texture, turgor normal for ethnicity, no rashes or lesions, warm and dry    Recent Labs   Lab 24  0524   RBC 3.37*   HGB 7.5*   HCT 26.0*   MCV 77.2*   MCH 22.3*   MCHC 28.8*   RDW 24.7   NEPRELIM 21.90*   WBC 27.4*   .0     Recent Labs   Lab 24  0704 24  1622 01/15/24  2027  01/16/24  0503 01/16/24  0931 01/16/24  1946 01/17/24  0524   *   < > 96 96 95 107* 131*   BUN 14   < > 25* 26* 28* 34* 33*   CREATSERUM 0.57   < > 0.65 0.71 0.79 0.87 0.97   CA 9.3   < > 10.5* 9.9 10.2* 10.3* 9.8   ALB 3.1*  --  3.0*  3.0* 2.9*  --   --   --    *   < > 141 140 142 146* 146*   K 3.6   < > 4.1 3.8 3.8 3.6 3.4*   CL 99   < > 111 109 111 114* 113*   CO2 21.0   < > 19.0* 25.0 24.0 28.0 27.0   ALKPHO 211*  --  211*  211* 193*  --   --   --    AST 96*  --  120*  120* 117*  --   --   --    ALT 39  --  48  48 46  --   --   --    BILT 0.9  --  1.0  1.0 1.0  --   --   --    TP 7.0  --  6.9  6.9 6.6  --   --   --     < > = values in this interval not displayed.     Recent Labs   Lab 01/17/24  0856   ABGPHT 7.38   FAIDGQ3F 45   LPEPD2A 78*   ABGHCO3 26.0   ABGBE 1.3   TEMP 98.6   MOE Positive   SITE Right Radial   DEV Bi-PAP   THGB 8.5*     No results for input(s): \"BNP\" in the last 168 hours.  No results for input(s): \"TROP\", \"CK\" in the last 168 hours.    XR CHEST AP PORTABLE  (CPT=71045)    Result Date: 1/16/2024  CONCLUSION:    Feeding tube has been placed.  The metal tip is excluded from the radiograph, but the portion of catheter visualized extends below the diaphragm into the left upper quadrant, and therefore the feeding tube reaches at least the stomach, although the exact position is not determined.  If exact position needed, consider x-ray of the abdomen.  Redemonstration elevated right diaphragm, and reticular nodular infiltrate throughout the lungs.  The heart is mostly obscured but appears enlarged. LOCATION:  [facility]      Dictated by (CST): Wilman London MD on 1/16/2024 at 1:45 PM     Finalized by (CST): Wilman London MD on 1/16/2024 at 1:46 PM           Assessment/Plan:  Acute hypoxic respiratory failure: Possibly related to lymphangitic spread, pulmonary masses. Initially thought pulmonary edema-now less likely   - Increased O2 needs post anesthesia for MRI on 1/16  -  Weaned fro AVAPS this morning. Currently requiring 5L/NC, wean further as tolerated.   - Chest xray with bilateral pulmonary masses with small right and trace left pleural effusions.Does not appear to be pulm edema  - CTA chest negative for PE  - RVP negative  - Wean o2 as able    Acute encephalopathy: Unclear etiology. May be multifactorial. Delirium may be contributing given hallucinations and lack of sleep, narcotics. May need to rule out further CNS involvement  - Continuous dilaudid PCA could also be a contributing factor  - MRI brain negative for acute process or mets  - Ammonia elevated, continue lactulose. May be contributing, but not likely to be the only factor  - ABG without hypercapnea  - Meds reviewed by pharmacy. Holding seroquel and buspar dicontinued  - Requiring precedex for anxiety. Will start ativan and wean gtt  - Re-orient frequently and PRN  - Consider neuro consult and/or LP given persistent leukocytosis     Ovarian mass with hepatic and pulmonary metastases  - New diagnosis this admit, CT abd with multiple pulmonary, hepatic metastases, omental mass, 7.8cm L ovarian mass.  - S/p ultrasound guiding biopsy of liver 1/9/23, pathology with prelim results malignant  - MRI brain without metastases   - Gyn/Oncology following-suspect GYN source but awaiting path results. Plan for possible chemo tomorrow or Friday pending improvement in SOB/O2 needs.    - pain control    Abdominal pain/distention, transaminitis: In setting of hepatic metastases  - LFTs elevated  - Abd ultrasound 1/14 with trace ascites, KUB without acute process.  - CT abdomen with hepatomegaly  - Dilaudid PCA, norco PRN  - Bowel regimen  - Daily CMP    Significant LE edema  - Pro BNP elevated   - Net (+) since admit  -s/p lasix on 1/15. Holding on further diuresis as CXR does not appear to be FVO  - TTE with hyperdynamic EF (70-75%), IVC dilated  - Elevate LEs as able    Transaminitis: In setting of hepatic metastases  -LFTs  elevated  -Daily CMP      Urinary retention  - Flomax  - Urology following, hunter in plae    ID: Leukocytosis  - WBC has consistently been elevated >32405 this admission- appears stable, slightly improved today 27.4  - Afebrile, monitor fever curve  - Pct 0.65  - Lactic acid 2.2, now normalized  - Blood cultures NGTD  - MRSA negative  - UA negative  - Zosyn (1/15- )  - Consider ID consult if counts worsen     Tachycardia: Likely multifactorial due to ongoing pain, hypoxia  - EKG showed afib (No history and currently in NSR/ST). Hold on a/c  - Received low dose BB x1 overnight  - monitor on tele  - pain mgt as above    Microcytic Anemia 2/2 to iron deficiency  - EGD/colonoscopy 1/11/23 without active bleed  - No evidence of active bleeding  - Baseline hgb 7.5-8.0.  - S/p 1u prbcs 1/10/23, 1u prbcs 1/12/23  - s/p IV venofer (1/9-1/11)  - Transfuse to maintain HGB >7.  - Daily CBC    F/E/N:    - Repelte electrolyte per protocl  - dobhoff-TFs (had poor nutrition with po intake)    Proph:  - SQ Lovenox  - SCDs  - PT/OT    Dispo:   - Full code  - Palliative care following  - ICU monitoring. Daughter updated at bedside    Plan of care discussed with intensivist, Dr. Lorena Vick, [facility]  ICU  Phone  [phone]   Pager [phone]    Addendum:  Pt seen and examined with ICU APN.  I agree with exam and plan as listed above.  Main problem currently is pain control and severe agitation despite IV dilaudid gtt, precedex gtt and prn benzo's.  MS waxes and wanes; currently delirious and mildly agitated.  Trying to avoid over sedation bc of resp status.  Will consult psych for assistance in psych meds.  Swallow eval later today, if more calm.  Hold on neuro/EEG consult as she doesn't have obvious focal deficits and recent MRI was unremarkable.  Use AVAPS prn depending on level of sedation.  Will keep in ICU for ongoing care.  Crit care time: 45 minutes    Freeman Carrillo MD

## 2024-01-18 PROBLEM — C80.1 CANCER WITH UNKNOWN PRIMARY SITE (HCC): Status: ACTIVE | Noted: 2024-01-01

## 2024-01-18 PROBLEM — G93.40 ACUTE ENCEPHALOPATHY: Status: ACTIVE | Noted: 2024-01-01

## 2024-01-18 NOTE — HOSPICE RN NOTE
[facility] consult order received. Hospice will meet with family later today.    Bell Taylro RN, PN  [facility] Liaison  [phone] after hours

## 2024-01-18 NOTE — PROGRESS NOTES
Reason for In-Patient Consult:  A genetic consultation was requested to address Alysia Campos's adult children's questions about genetic testing in light of Ms. Campos's new diagnosis of metastatic cancer of unknown primary at age 53.     Ms. Campos was admitted to the hospital on 01/08/24 with nausea, vomiting, and shortness of breath. Imaging showed multiple lung and liver lesions in addition to large ovarian mass consistent with metastatic cancer of unclear primary. A liver biopsy was performed on 01/09/24 and is reportedly consistent with poorly differentiated carcinoma of unclear origin. Blocks were sent to [facility] for review along with a request for NGS analysis, if possible (per Dr. Sade Trivedi). Results are pending; ETA is unknown. A colonoscopy and EGD performed on 01/11/24 by Dr. Morton was unremarkable with one diminutive colon polyp, not removed, observed in the transverse colon.     Consultation:  I met with Ms. Campos's son, Chano, outside of Ms. Campos's ICU room. Per Chano, Ms. Campos's mother and Ms. Campos's grandmother's sister had breast cancer at unspecified ages. Chano, age 28, is asking if his two sisters, ages 15 and 24, and he need genetic testing to learn if they are at high-risk for cancer. Chano also has two sons and Chano's sister has a daughter.     I explained that approximately 5-10% of cancers are related to a hereditary cancer syndrome and that genetic testing is most informative when a pathogenic variant is found that explains the cancers in that individual and/or the family. Without knowing what type of cancer Ms. Campos has, it is unclear if Ms. Campos's (and by extension her children's) risk for a hereditary cancer predisposition syndrome is substantially different than that general 5-10% risk estimate.    I clarified for Chano that hereditary germline testing has not been performed on Ms. Campos during her current in-patient stay for newly diagnosed metastatic cancer of unknown  origin. I discussed the logistics of performing this on Ms. Campos for the benefit of Chano and his siblings which, in the current situation, would involve drawing blood (two purple top EDTA tubes) and sending them out for germline testing (I recommend [employer] Multi-Cancers panel). The testing would be self-pay for $250 (to [employer]). I also advised Chano that germline testing can also be performed on unaffected adults (>18) with a family history of cancer, so he and his siblings and other relatives could explore this option for themselves at a later date as well.     Chano's youngest sister then came out of Ms. Campos's room. Chano asked for my card and [employer] Hereditary cancer genetic testing pamphlet. I encouraged him to call me with questions or if I can be of any additional help to him or his family.    Approximately 10 minutes was spent in discussion with Chano.     Requesting Providers:  DO Jessie Johns MD

## 2024-01-18 NOTE — CONSULTS
Hematology/Oncology Initial Consultation Note    Patient Name: Alysia Campos  Medical Record Number: [id number]    YOB: 1970   Date of Consultation: 1/18/2024   Physician requesting consultation: Jessie Ngo MD     Reason for Consultation:  Alysia Campos was seen today for the diagnosis of metastatic malignancy    History of Present Illness: 53-year-old was admitted to the hospital on 1/8/24 after presenting with progressive fatigue, dyspnea, nausea, vomiting, and abdominal pain.  Extensive workup including labs and imaging showed concerns for diffusely metastatic cancer involving liver, lungs, omentum, and pelvis.  Liver biopsy on 1/9 has come back showing malignancy though was sent to [facility] for further review in effort to try to distinguish site of origin and type of cancer.  I just spoke with Dr. Sade Trivedi in pathology again minutes ago after I spoke with family earlier today.  Dr. Trivedi states that she just received the final pathology from [facility], tissue has been reported as poorly differentiated carcinoma of unknown primary.    History obtained based on discussion with multiple other care providers and family members.  Family is present at the time of clinic visit including her 3 children.  Patient is sleeping during my visit and appears comfortable.  Has been requiring about 4 mg of IV morphine an hour lately for pain control.  When she wakes up she is in severe pain.  Family and other providers state that patient is unable to communicate effectively even when she is awake due to pain and confusion.  During her hospitalization she has had further clinical deterioration with increasing oxygen requirements.    Past Medical History:  Past Medical History:   Diagnosis Date    Anemia     Migraines      Past Surgical History:   Procedure Laterality Date    COLONOSCOPY N/A 1/11/2024    Procedure: .;  Surgeon: Ubaldo Morton MD;  Location: [facility]  Medications:  No current facility-administered medications on file prior to encounter.     Current Outpatient Medications on File Prior to Encounter   Medication Sig Dispense Refill    ibuprofen 400 MG Oral Tab Take 1 tablet (400 mg total) by mouth every 6 (six) hours as needed for Pain.       Current Inpatient Medications:  Inpatient Meds:   acetaminophen  1,000 mg Intravenous Q8H    piperacillin-tazobactam  3.375 g Intravenous Q8H    enoxaparin  40 mg Subcutaneous Q24H    tamsulosin  0.4 mg Oral Nightly    multivitamin  1 tablet Oral Daily      morphINE in sodium chloride 0.9% 3 mg/hr (01/18/24 1500)    dextrose 50 mL/hr at 01/18/24 1011    dexmedetomidine 0.2 mcg/kg/hr (01/18/24 1602)    dextrose 10%      norepinephrine Stopped (01/18/24 1126)    sodium chloride 10 mL/hr at 01/14/24 1708     PRN Meds:  morphINE, sennosides, haloperidol lactate, LORazepam, dextrose 10%, lipase-protease-amylase (Lip-Prot-Amyl) **AND** sodium bicarbonate, naloxone, ipratropium-albuterol, dextromethorphan-guaiFENesin, ondansetron, prochlorperazine    Allergies:   Allergies   Allergen Reactions    Mold TINITUS       Psychosocial History:  Social History     Social History Narrative    Not on file     Social History     Socioeconomic History    Marital status:    Tobacco Use    Smoking status: Never    Smokeless tobacco: Never   Vaping Use    Vaping Use: Never used   Substance and Sexual Activity    Alcohol use: Not Currently    Drug use: Never     Social Determinants of Health     Food Insecurity: No Food Insecurity (1/8/2024)    Food Insecurity     Food Insecurity: Never true   Transportation Needs: No Transportation Needs (1/8/2024)    Transportation Needs     Lack of Transportation: No   Housing Stability: Low Risk  (1/8/2024)    Housing Stability     Housing Instability: No     Family Medical History:  Family History   Problem Relation Age of Onset    Breast Cancer Mother 60 - 69     Review of Systems:  A 10-point ROS was  done with pertinent positives and negative per the HPI    Vital Signs:  Height: --  Weight: 83.9 kg (184 lb 15.5 oz) (01/18 0549)  BSA (Calculated - sq m): --  Pulse: 88 (01/18 1600)  BP: 97/43 (01/18 1600)  Temp: 97.7 °F (36.5 °C) (01/18 1600)  Do Not Use - Resp Rate: --  SpO2: 94 % (01/18 1600)    Wt Readings from Last 6 Encounters:   01/18/24 83.9 kg (184 lb 15.5 oz)   01/12/24 85.2 kg (187 lb 12.8 oz)     ECOG PS: 4    Physical Examination:  General: Patient is asleep  Psych: Unable to assess   Eyes: Closed  ENT: Oropharynx is clear  CV: Regular rate and rhythm, no murmurs, no LE edema  Respiratory: Lungs clear to auscultation bilaterally  GI/Abd: Massive hepatomegaly  neurological: Unable to assess  Lymphatics: No palpable lymphadenopathy  Skin: no rashes or petechiae    Laboratory:  Recent Labs   Lab 01/16/24  0503 01/17/24 0524 01/18/24  0411   WBC 32.2* 27.4* 30.8*   HGB 7.7* 7.5* 7.4*   HCT 25.4* 26.0* 25.5*   .0 313.0 285.0   MCV 74.7* 77.2* 76.8*   RDW 24.1 24.7 24.9   NEPRELIM 24.12* 21.90* 23.68*     Recent Labs   Lab 01/15/24  2027 01/16/24  0503 01/16/24 0931 01/16/24 1946 01/17/24  0524 01/18/24  0411    140   < > 146* 146* 145   K 4.1 3.8   < > 3.6 3.4* 4.0  4.0    109   < > 114* 113* 114*   CO2 19.0* 25.0   < > 28.0 27.0 27.0   BUN 25* 26*   < > 34* 33* 37*   CREATSERUM 0.65 0.71   < > 0.87 0.97 0.95   GLU 96 96   < > 107* 131* 122*   CA 10.5* 9.9   < > 10.3* 9.8 10.0   PHOS 1.9* 3.2  --   --   --   --    TP 6.9  6.9 6.6  --   --   --   --    ALB 3.0*  3.0* 2.9*  --   --   --   --    ALKPHO 211*  211* 193*  --   --   --   --    *  120* 117*  --   --   --   --    ALT 48  48 46  --   --   --   --    BILT 1.0  1.0 1.0  --   --   --   --     < > = values in this interval not displayed.     No results for input(s): \"PT\", \"INR\", \"PTT\", \"FIB\" in the last 168 hours.    Lab Results   Component Value Date    .3 (H) 01/08/2024     Lab Results   Component Value Date      150.3 (H) 01/08/2024     Lab Results   Component Value Date    LDH 1,331 (H) 01/11/2024    LDH 1,190 (H) 01/10/2024     Lab Results   Component Value Date     61.7 (H) 01/08/2024     Imaging:    CT a/p 1/8/24: CONCLUSION:    1. Findings concerning for metastatic disease.  There are innumerable nodules within the visualized lungs as well as innumerable metastatic lesions throughout the liver.  Additionally, there is an omental base mass within the right lower quadrant and a   large mass which appears to be emanating from the left ovary measuring 7.8 cm.   2. There is no discrete evidence of an acute intra-abdominal or pelvic process.     CTA chest 1/10/24: CONCLUSION:     1. Negative for pulmonary embolism.    2. 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  Larger areas of consolidation involving the lower lobes and lingula may reflect additional sites of metastatic disease versus superimposed pneumonia.  The right lower lobe consolidation is increased in size compared to 01/08/2024.    3. Findings of extensive malignant lymphadenopathy of the thorax.    4. Widespread hepatic metastatic disease.        MRI brain 1/15/24: No abnormal enhancement to suggest metastases.     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  Primary site of origin is not clear.  I discussed diagnosis, prognosis, and treatment recommendations at length with patient's family at bedside today.  Patient was requiring IV opiates for severe pain control which put her to sleep during my visit.  We discussed that standard of care therapy for metastatic cancer involves palliative chemotherapy with goal of prolonging life and minimizing symptoms related to uncontrolled cancer growth.  We discussed that this therapy is only given to patients with a good performance status  otherwise the toxicity may outweigh the benefits.  We discussed that given severe clinical deterioration and present performance status, she is not a candidate to receive chemotherapy.  I advised that giving chemotherapy at this time would likely pose more harm than benefit.  I recommended transition to comfort care only/hospice.  I spoke with palliative care, Dr. Ngo regarding her case as well who has been following closely with patient and her family.  -I will plan to return tomorrow to answer any additional questions or family may have.  They also plan to meet with hospice for additional information as well.    Maikel Zaldivar MD  Hematology/Medical Oncology  [facility]

## 2024-01-18 NOTE — PROGRESS NOTES
[facility]  Follow up note    Alysia Campos Patient Status:  Inpatient    7/15/1970 MRN [id number]   Location [facility] 4NW-A Attending Harshal Aponte MD   Hosp Day # 10 PCP Renetta Santos, DO     Interval History--  Patient was seen this AM with multiple family members at bedside. I did also discuss with pathology with no etiology based on their stains but additional stains should be out soon. She was still agitated overnight, fever this AM.     History:  Past Medical History:   Diagnosis Date    Anemia     Migraines      Past Surgical History:   Procedure Laterality Date    COLONOSCOPY N/A 2024    Procedure: .;  Surgeon: Ubaldo Morton MD;  Location:  ENDOSCOPY     Family History   Problem Relation Age of Onset    Breast Cancer Mother 60 - 69      reports that she has never smoked. She has never used smokeless tobacco. She reports that she does not currently use alcohol. She reports that she does not use drugs.    Allergies:  Allergies   Allergen Reactions    Mold TINITUS       Medications:    Current Facility-Administered Medications:     sennosides (Senokot) tab 8.6 mg, 8.6 mg, Oral, BID PRN    haloperidol lactate (Haldol) 5 MG/ML injection 2 mg, 2 mg, Intravenous, Q4H PRN    LORazepam (Ativan) 2 mg/mL injection 1 mg, 1 mg, Intravenous, Q4H PRN    HYDROmorphone 0.6 mg BOLUS FROM PCA, 0.6 mg, Intravenous, Q1H PRN    dextrose 5% infusion, , Intravenous, Continuous    dexmedeTOMIDine (Precedex) 800 mcg in sodium chloride 0.9% 100 mL infusion, 0.2-1.5 mcg/kg/hr (Dosing Weight), Intravenous, Continuous    lactulose (CHRONULAC) 10 GM/15ML solution 20 g, 20 g, Oral, TID    dextrose 10% infusion (TPN no rate), , Intravenous, Continuous PRN    pancrelipase (Lip-Prot-Amyl) (Zenpep) DR particles cap 10,000 Units, 10,000 Units, Per G Tube, PRN **AND** sodium bicarbonate tab 325 mg, 325 mg, Per G Tube, PRN    HYDROmorphone in sodium chloride 0.9% (Dilaudid) 20 mg/100mL PCA premix, , Intravenous,  Continuous    norepinephrine (Levophed) 8 mg in dextrose 5% 250 mL infusion, 0.5-30 mcg/min, Intravenous, Continuous    piperacillin-tazobactam (Zosyn) 3.375 g in dextrose 5% 100 mL IVPB-ADDV, 3.375 g, Intravenous, Q8H    enoxaparin (Lovenox) 40 MG/0.4ML SUBQ injection 40 mg, 40 mg, Subcutaneous, Q24H    tamsulosin (Flomax) cap 0.4 mg, 0.4 mg, Oral, Nightly    benzonatate (Tessalon) cap 100 mg, 100 mg, Oral, Q8H    sodium chloride 0.9% infusion, , Intravenous, Continuous    naloxone (Narcan) 0.4 MG/ML injection 0.08 mg, 0.08 mg, Intravenous, Q5 Min PRN    acetaminophen (Tylenol) tab 650 mg, 650 mg, Oral, Q8H    guaiFENesin ER (Mucinex) 12 hr tab 600 mg, 600 mg, Oral, BID    multivitamin (Centrum) chewable tab (Adult) 1 tablet, 1 tablet, Oral, Daily    ipratropium-albuterol (Duoneb) 0.5-2.5 (3) MG/3ML inhalation solution 3 mL, 3 mL, Nebulization, Q6H PRN    dextromethorphan-guaiFENesin (Robitussin-DM)  mg/5mL oral solution 5 mL, 5 mL, Oral, Q6H PRN    ondansetron (Zofran) 4 MG/2ML injection 4 mg, 4 mg, Intravenous, Q6H PRN    prochlorperazine (Compazine) 10 MG/2ML injection 5 mg, 5 mg, Intravenous, Q8H PRN    Review of Systems:  A 10-point review of systems was done with pertinent positives and negatives per the HPI.    Physical Exam:   Blood pressure 94/51, pulse 84, temperature 98.9 °F (37.2 °C), temperature source Temporal, resp. rate (!) 27, height 5' 2\" (1.575 m), weight 184 lb 15.5 oz (83.9 kg), SpO2 97%.   General: Patient is alert; appeared uncomfortable, but improved after she pressed for her pain med   Chest: Clear to auscultation. + cough    Heart: Regular rate     Abdomen: distended.   Extremities: + b/l LE edema    Neurological: Grossly intact.          Laboratory Data:    Lab Results   Component Value Date    WBC 30.8 01/18/2024    HGB 7.4 01/18/2024    HCT 25.5 01/18/2024    .0 01/18/2024    CREATSERUM 0.95 01/18/2024    BUN 37 01/18/2024     01/18/2024    K 4.0 01/18/2024    K 4.0  01/18/2024     01/18/2024    CO2 27.0 01/18/2024     01/18/2024    CA 10.0 01/18/2024       Imaging:  XR CHEST AP PORTABLE  (CPT=71045)    Result Date: 1/16/2024  CONCLUSION:    Feeding tube has been placed.  The metal tip is excluded from the radiograph, but the portion of catheter visualized extends below the diaphragm into the left upper quadrant, and therefore the feeding tube reaches at least the stomach, although the exact position is not determined.  If exact position needed, consider x-ray of the abdomen.  Redemonstration elevated right diaphragm, and reticular nodular infiltrate throughout the lungs.  The heart is mostly obscured but appears enlarged. LOCATION:  [facility]      Dictated by (CST): Wilman London MD on 1/16/2024 at 1:45 PM     Finalized by (CST): Wilman London MD on 1/16/2024 at 1:46 PM       XR CHEST AP PORTABLE  (CPT=71045)    Result Date: 1/16/2024  CONCLUSION:  No significant interval change.   LOCATION:  [id number]      Dictated by (CST): Silviano Motta MD on 1/16/2024 at 6:47 AM     Finalized by (CST): Silviano Motta MD on 1/16/2024 at 6:48 AM       XR CHEST AP PORTABLE  (CPT=71045)    Result Date: 1/15/2024  CONCLUSION:  1. When compared to most recent chest radiograph dated 1/8/2024, increasing bilateral interstitial and nodular opacities consistent with metastases.  Superimpose infectious/edematous process cannot be excluded.   LOCATION:  [facility]      Dictated by (CST): Deyanira Whipple MD on 1/15/2024 at 6:56 PM     Finalized by (CST): Deyanira Whipple MD on 1/15/2024 at 7:06 PM       MRI BRAIN (W+WO) (CPT=70553)    Result Date: 1/15/2024  CONCLUSION:  1. No abnormal enhancement to suggest metastases.   LOCATION:  [facility]    Dictated by (CST): Deyanira Whipple MD on 1/15/2024 at 5:40 PM     Finalized by (CST): Deyanira Whipple MD on 1/15/2024 at 5:44 PM       US ABDOMEN LIMITED (CPT=76705)    Result Date: 1/14/2024  CONCLUSION:  Limited ultrasound of the abdomen was performed evaluating all 4 quadrants  of the abdomen and pelvis for ascites.   There is trace ascites identified adjacent to the liver.  No large pocket of ascites demonstrated.   LOCATION:  [id number]   Dictated by (CST): Sherita Whitman MD on 1/14/2024 at 1:31 PM     Finalized by (CST): Sherita Whitman MD on 1/14/2024 at 1:32 PM       XR ABDOMEN (1 VIEW) (CPT=74018)    Result Date: 1/13/2024  CONCLUSION:  No acute disease.    LOCATION:  [facility]   Dictated by (CST): Nomi Dawson MD on 1/13/2024 at 6:58 PM     Finalized by (CST): Nomi Dawson MD on 1/13/2024 at 6:59 PM       MRI STROKE BRAIN DWI ONLY(NO IV)(CPT=70551)    Result Date: 1/12/2024  CONCLUSION:  Severely limited study with motion artifact on a DWI only study demonstrating no definite evidence of infarct.   LOCATION:  [facility]   Dictated by (CST): Nomi Dawson MD on 1/12/2024 at 4:56 PM     Finalized by (CST): Nomi Dawson MD on 1/12/2024 at 4:59 PM       CT CHEST PE AORTA (IV ONLY) (CPT=71260)    Result Date: 1/10/2024  CONCLUSION:   1. Negative for pulmonary embolism.  2. 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  Larger areas of consolidation involving the lower lobes and lingula may reflect additional sites of metastatic disease versus superimposed pneumonia.  The right lower lobe consolidation is increased in size compared to 01/08/2024.  3. Findings of extensive malignant lymphadenopathy of the thorax.  4. Widespread hepatic metastatic disease.    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   LOCATION:  [facility]    Dictated by (CST): Bre Silva DO on 1/09/2024 at 10:16 PM     Finalized by (CST): Bre Silva DO on  1/09/2024 at 10:18 PM       US BIOPSY LIVER (CPT=76942/05225)    Result Date: 1/9/2024  CONCLUSION:  Technically successful ultrasound-guided core biopsy of a representative right hepatic lobe mass.   LOCATION:  [facility]     Dictated by (CST): Ranulfo Doshi MD on 1/09/2024 at 1:03 PM     Finalized by (CST): Ranulfo Doshi MD on 1/09/2024 at 1:04 PM       CT CHEST (ZNM=[id number])    Result Date: 1/8/2024  CONCLUSION:  Innumerable pulmonary masses are noted with large confluent masses at lung bases bilaterally.  There is mediastinal and hilar lymphadenopathy.  Findings are consistent with a diffusely metastatic disease.  LOCATION:     Dictated by (CST): Melvin Adan MD on 1/08/2024 at 7:53 PM     Finalized by (CST): Melvin Adan MD on 1/08/2024 at 7:58 PM       CT ABDOMEN PELVIS IV CONTRAST, NO ORAL (ER)    Result Date: 1/8/2024  CONCLUSION:  1. Findings concerning for metastatic disease.  There are innumerable nodules within the visualized lungs as well as innumerable metastatic lesions throughout the liver.  Additionally, there is an omental base mass within the right lower quadrant and a large mass which appears to be emanating from the left ovary measuring 7.8 cm. 2. There is no discrete evidence of an acute intra-abdominal or pelvic process.   LOCATION:  [id number]   Dictated by (CST): Bre Silva DO on 1/08/2024 at 12:17 PM     Finalized by (CST): Bre Silva DO on 1/08/2024 at 12:25 PM       XR CHEST PA + LAT CHEST (CPT=71046)    Result Date: 1/8/2024  CONCLUSION:  1. Suggestion of multiple bilateral pulmonary nodules.  Recommend CT of the chest for further evaluation.   LOCATION:  [facility]   Dictated by (CST): Deyanira Whipple MD on 1/08/2024 at 10:27 AM     Finalized by (CST): Deyanira Whipple MD on 1/08/2024 at 10:29 AM         Impression and Plan:    Patient Active Problem List   Diagnosis    Hypokalemia    Metastatic malignant neoplasm, unspecified site (HCC)    Hyponatremia    Leukocytosis, unspecified type    Anemia,  unspecified type    Cancer related pain    Palliative care by specialist    Goals of care, counseling/discussion    Encephalopathy       Impression  Metastatic malignancy based on imaging, unclear primary lesion.   Widely metastatic with lymphangitic spread in lungs, liver metastases, pelvic metastases, ovarian mass.  Liver biopsy was done 1/9 per IR.  Prelim pathology is malignant, but not much further clarification is yet available.   Probably carcinoma based on IHC.  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   She needed one unit PRBCs. Venofer x 3 has completed earlier this week.  Tachycardia, SOB.   CTA negative for PE  Pain control on PCA.  Palliative care following     Plan:  I had a long discussion with the son as well as multiple family members regarding her status. I was hopeful a few days ago that we would be able to have a pathology result back to identify etiology and then think about inpatient chemotherapy or potentially use carboplatin + paclitaxel and treat for cancer of unknown origin however no clear source has yet to be identified, additional stains are in process. I discussed that her malignancy regardless of etiology, is aggressive and have already seen progression with scans while inpatient which has caused her to have worsening shortness of breath and now with fever and possible infection  The family was very frustrated on what has been done thus far and that no treatment has been given since admission and I discussed that we always try to find the etiology prior to treatment given all malignancies have different regimens and would cause more harm than good. I discussed that chemotherapy would cause side effects and even if the regimen was correct, I do not think based on how aggressive this is, that we would see any sort of response quickly and usually would take about 2-3 months to see if there is a response. I also tried to state that  even if we did chemotherapy last week, I do not think it would've helped significantly to the point she wouldn't have her current symptoms  I discussed with palliative as well and no decision on hospice is needed today but I do think hospice is the appropriate choice and I will see her tomorrow again and may have an update from [facility] as well.    Kendell Mcarthur MD

## 2024-01-18 NOTE — CONSULTS
[facility] nursing visit for follow up on hospice consult order. Family wants to meet tomorrow morning 11 am. Please call [facility] with any questions or concerns.    Bell Taylor RN, PN  [facility] Liaison  [phone] after hours

## 2024-01-18 NOTE — PROGRESS NOTES
Occupational Therapy    Continuing to follow for OT. Per RN and EMR, pt remains inappropriate for therapy eval at this time. Will f/u next week if pt becomes appropriate for skilled therapy intervention.

## 2024-01-18 NOTE — PROGRESS NOTES
Alysia Campos Patient Status:  Inpatient    7/15/1970 MRN [id number]   [facility] 4SW-A Attending Maikel Valera DO   Hosp Day # 10 PCP Renetta Santos DO     Critical Care Progress Note          Subjective:  Weaned from AVAPS this morning. Awake, oriented to self and place. Appears SOB, moaning  in pain and complaining of increased pain with urination-hunter in place.     Objective:    Medications:   acetaminophen  1,000 mg Intravenous Q8H    piperacillin-tazobactam  3.375 g Intravenous Q8H    enoxaparin  40 mg Subcutaneous Q24H    tamsulosin  0.4 mg Oral Nightly    multivitamin  1 tablet Oral Daily       FiO2 (%):  [93 %] 93 % ,  5 L NC      Intake/Output Summary (Last 24 hours) at 2024 1044  Last data filed at 2024 1011  Gross per 24 hour   Intake 1740.4 ml   Output 1275 ml   Net 465.4 ml       BP (!) 83/42   Pulse 77   Temp 98.7 °F (37.1 °C) (Temporal)   Resp 23   Ht 157.5 cm (5' 2\")   Wt 184 lb 15.5 oz (83.9 kg)   LMP  (LMP Unknown)   SpO2 96%   BMI 33.83 kg/m²     Physical Exam:  General Appearance: Lethargic, conversational dyspnea, moaning in pain with attempting to urinate  Neck: No JVD, neck supple, no adenopathy, trachea midline, no carotid bruits  Lungs: Clear to auscultation bilaterally, respirations unlabored  Heart: Regular rate and rhythm, S1 and S2 normal, no murmur, rub or gallop  Abdomen: Distended, tender to palpation. Hypoactive bowel sounds.  Extremities: Lower Extremities significantly edematous 3+ pitting.   Pulses: 2+ and symmetric all extremities  Skin: Skin color, texture, turgor normal for ethnicity, no rashes or lesions, warm and dry    Recent Labs   Lab 24  0411   RBC 3.32*   HGB 7.4*   HCT 25.5*   MCV 76.8*   MCH 22.3*   MCHC 29.0*   RDW 24.9   NEPRELIM 23.68*   WBC 30.8*   .0     Recent Labs   Lab 01/15/24  2027 24  0503 24  0931 24  1946 24  0524 24  0411   GLU 96 96   < > 107* 131* 122*   BUN 25* 26*    < > 34* 33* 37*   CREATSERUM 0.65 0.71   < > 0.87 0.97 0.95   CA 10.5* 9.9   < > 10.3* 9.8 10.0   ALB 3.0*  3.0* 2.9*  --   --   --   --     140   < > 146* 146* 145   K 4.1 3.8   < > 3.6 3.4* 4.0  4.0    109   < > 114* 113* 114*   CO2 19.0* 25.0   < > 28.0 27.0 27.0   ALKPHO 211*  211* 193*  --   --   --   --    *  120* 117*  --   --   --   --    ALT 48  48 46  --   --   --   --    BILT 1.0  1.0 1.0  --   --   --   --    TP 6.9  6.9 6.6  --   --   --   --     < > = values in this interval not displayed.     Recent Labs   Lab 01/17/24  0856   ABGPHT 7.38   EZNHLH3X 45   GMDXM0F 78*   ABGHCO3 26.0   ABGBE 1.3   TEMP 98.6   MOE Positive   SITE Right Radial   DEV Bi-PAP   THGB 8.5*     No results for input(s): \"BNP\" in the last 168 hours.  No results for input(s): \"TROP\", \"CK\" in the last 168 hours.    No results found.       Assessment/Plan:  Acute hypoxic respiratory failure: Possibly related to lymphangitic spread, pulmonary masses. Initially thought pulmonary edema-now less likely   - Increased O2 needs post anesthesia for MRI on 1/16  - Weaned fro AVAPS this morning. Currently requiring 5L/NC, wean further as tolerated.   - Chest xray with bilateral pulmonary masses with small right and trace left pleural effusions.Does not appear to be pulm edema  - CTA chest negative for PE  - RVP negative  - Wean o2 as able    Acute encephalopathy: Unclear etiology. May be multifactorial. Delirium may be contributing given hallucinations and lack of sleep, narcotics. May need to rule out further CNS involvement  - Continuous dilaudid PCA could also be a contributing factor  - MRI brain negative for acute process or mets  - Ammonia elevated, continue lactulose. May be contributing, but not likely to be the only factor  - ABG without hypercapnea  - Meds reviewed by pharmacy. Holding seroquel and buspar dicontinued  - Requiring precedex for anxiety. Will start ativan and wean gtt  - Re-orient frequently  and PRN  - Consider neuro consult and/or LP given persistent leukocytosis     Ovarian mass with hepatic and pulmonary metastases  - New diagnosis this admit, CT abd with multiple pulmonary, hepatic metastases, omental mass, 7.8cm L ovarian mass.  - S/p ultrasound guiding biopsy of liver 1/9/23, pathology with prelim results malignant  - MRI brain without metastases   - Gyn/Oncology following-suspect GYN source but awaiting path results. Plan for possible chemo tomorrow or Friday pending improvement in SOB/O2 needs.    - pain control    Abdominal pain/distention, transaminitis: In setting of hepatic metastases  - LFTs elevated  - Abd ultrasound 1/14 with trace ascites, KUB without acute process.  - CT abdomen with hepatomegaly  - Dilaudid PCA, norco PRN  - Bowel regimen  - Daily CMP    Significant LE edema  - Pro BNP elevated   - Net (+) since admit  -s/p lasix on 1/15. Holding on further diuresis as CXR does not appear to be FVO  - TTE with hyperdynamic EF (70-75%), IVC dilated  - Elevate LEs as able    Transaminitis: In setting of hepatic metastases  -LFTs elevated  -Daily CMP      Urinary retention  - Flomax  - Urology following, hunter in plae    ID: Leukocytosis  - WBC has consistently been elevated >94434 this admission- appears stable, slightly improved today 27.4  - Afebrile, monitor fever curve  - Pct 0.65  - Lactic acid 2.2, now normalized  - Blood cultures NGTD  - MRSA negative  - UA negative  - Zosyn (1/15- )  - Consider ID consult if counts worsen     Tachycardia: Likely multifactorial due to ongoing pain, hypoxia  - EKG showed afib (No history and currently in NSR/ST). Hold on a/c  - Received low dose BB x1 overnight  - monitor on tele  - pain mgt as above    Microcytic Anemia 2/2 to iron deficiency  - EGD/colonoscopy 1/11/23 without active bleed  - No evidence of active bleeding  - Baseline hgb 7.5-8.0.  - S/p 1u prbcs 1/10/23, 1u prbcs 1/12/23  - s/p IV venofer (1/9-1/11)  - Transfuse to maintain HGB  >7.  - Daily CBC    F/E/N:    - Repelte electrolyte per protocl  - dobhoff-TFs (had poor nutrition with po intake)    Proph:  - SQ Lovenox  - SCDs  - PT/OT    Dispo:   - Full code  - Palliative care following  - ICU monitoring. Daughter updated at bedside    Plan of care discussed with intensivist, Dr. Lorena Vick, [facility]  ICU  Phone  [phone]   Pager [phone]    Addendum:  Pt seen and examined with ICU APN.  I agree with exam and plan as listed above.  Main problem currently is pain control and severe agitation despite IV dilaudid gtt, precedex gtt and prn benzo's.  MS waxes and wanes; currently delirious and mildly agitated.  Trying to avoid over sedation bc of resp status.  Will consult psych for assistance in psych meds.  Swallow eval later today, if more calm.  Hold on neuro/EEG consult as she doesn't have obvious focal deficits and recent MRI was unremarkable.  Use AVAPS prn depending on level of sedation.  Will keep in ICU for ongoing care.  Crit care time: 45 minutes    Freeman Carrillo MD

## 2024-01-18 NOTE — PLAN OF CARE
Assumed care of pt following shift report. Pt is intermittently alert but delirious when alert and not redirectable, and she is also somnolent at times. Pt is not opening her eyes when she is restless or when asked to open them. Pt is not answering orientation/ assessment questions. She is not following commands. Pt is unable to state if she is in pain or what is wrong. Pt repeatedly asking for help when restless and also stated at one point \" I want to die; I cannot live this way\". She is unable to participate in a conversation about her current symptoms. Attempted to wean precedex but pt quickly became restless and was yelling for help. Administered prn Ativan with improvement in anxiety for a short period, Dilaudid PCA infusing; pt did require multiple boluses d/t appearing very uncomfortable and moaning. Willi vest in place and being monitored. Pt began to feel more hot throughout shift. Placed ice packs but pt still developed a fever this morning. Notified GREGORY Mosqueda APN and PO tylenol changed to IV.  Normotensive. Tele monitor reads NSR/ST when agitated. Increased to 8L highflow O2. Pt becomes tachypneic when anxious and takes shallow breaths. Remains NPO d/t pt removing DHT during dayshift. Accucheck q6 with D5 infusing. Flexiseal in place. [place] in place.    At beginning of shift, pt's daughter Carmen and her  wanted to discuss pt's treatment plan and current status. Carmen's  was very frustrated and both were questioning why everything was taking so long to complete, and they did not understand why chemo has not been started. Explained the issues that arose during inpatient stay that prevented diagnostics from being done right away. Provided a day to day timeline of interventions that have taken place. Both Carmen and her  were not understanding why the pt is on so many meds that are affecting her mental status. Reviewed current issues with pt's pain and anxiety. Recommended Carmen and her  Patient called with below information. Spoke with . Patient was still sleeping. Her  states he will have her call us back once she is awake.     be present during the day, so that they can voice their concerns with the physicians and get a better understanding of pt's prognosis and chemo plans. Pt's son, Chano arrived during the night to stay at pt's bedside. Answered his questions regarding pt status. See MAR and flowsheets for additional assessments.

## 2024-01-18 NOTE — PROGRESS NOTES
[facility]  Palliative Care Progress Note    Alysia Campos Patient Status:  Inpatient    7/15/1970 MRN [id number]   [facility] 4NW-A Attending Harshal Aponte MD   Hosp Day # 10 PCP Renetta Santos DO     History/Other:       Alysia Campos is a 53 year old female with migraines who was admitted on 2024 for cough, N/V/D. Work up in our hospital revealed CT scan with lung and liver nodules, an omental mass, and a left ovarian 8 cm mass and labs significant for iron deficiency anemia (Hgb 7.0), leukocytosis, elevated AST/Alk phos, hyponatremia (Na 124), and elevated tumor markers //CEA. Liver bx on  with documented prelim results per oncology that is suggestive of malignancy. Anemia s/p 2 units pRBCs and venofer x3 days.      Consult ordered by:: Dr. Mckeon for evaluation of Palliative Care needs and Uncontrolled symptoms.    Allergies:  Allergies   Allergen Reactions    Mold TINITUS     Medications:     Current Facility-Administered Medications:     acetaminophen (Ofirmev) 10 mg/mL infusion premix 1,000 mg, 1,000 mg, Intravenous, Q8H    sennosides (Senokot) tab 8.6 mg, 8.6 mg, Oral, BID PRN    haloperidol lactate (Haldol) 5 MG/ML injection 2 mg, 2 mg, Intravenous, Q4H PRN    LORazepam (Ativan) 2 mg/mL injection 1 mg, 1 mg, Intravenous, Q4H PRN    HYDROmorphone 0.6 mg BOLUS FROM PCA, 0.6 mg, Intravenous, Q1H PRN    dextrose 5% infusion, , Intravenous, Continuous    dexmedeTOMIDine (Precedex) 800 mcg in sodium chloride 0.9% 100 mL infusion, 0.2-1.5 mcg/kg/hr (Dosing Weight), Intravenous, Continuous    lactulose (CHRONULAC) 10 GM/15ML solution 20 g, 20 g, Oral, TID    dextrose 10% infusion (TPN no rate), , Intravenous, Continuous PRN    pancrelipase (Lip-Prot-Amyl) (Zenpep) DR particles cap 10,000 Units, 10,000 Units, Per G Tube, PRN **AND** sodium bicarbonate tab 325 mg, 325 mg, Per G Tube, PRN    HYDROmorphone in sodium chloride 0.9% (Dilaudid) 20 mg/100mL PCA premix, ,  Intravenous, Continuous    norepinephrine (Levophed) 8 mg in dextrose 5% 250 mL infusion, 0.5-30 mcg/min, Intravenous, Continuous    piperacillin-tazobactam (Zosyn) 3.375 g in dextrose 5% 100 mL IVPB-ADDV, 3.375 g, Intravenous, Q8H    enoxaparin (Lovenox) 40 MG/0.4ML SUBQ injection 40 mg, 40 mg, Subcutaneous, Q24H    tamsulosin (Flomax) cap 0.4 mg, 0.4 mg, Oral, Nightly    benzonatate (Tessalon) cap 100 mg, 100 mg, Oral, Q8H    sodium chloride 0.9% infusion, , Intravenous, Continuous    naloxone (Narcan) 0.4 MG/ML injection 0.08 mg, 0.08 mg, Intravenous, Q5 Min PRN    guaiFENesin ER (Mucinex) 12 hr tab 600 mg, 600 mg, Oral, BID    multivitamin (Centrum) chewable tab (Adult) 1 tablet, 1 tablet, Oral, Daily    ipratropium-albuterol (Duoneb) 0.5-2.5 (3) MG/3ML inhalation solution 3 mL, 3 mL, Nebulization, Q6H PRN    dextromethorphan-guaiFENesin (Robitussin-DM)  mg/5mL oral solution 5 mL, 5 mL, Oral, Q6H PRN    ondansetron (Zofran) 4 MG/2ML injection 4 mg, 4 mg, Intravenous, Q6H PRN    prochlorperazine (Compazine) 10 MG/2ML injection 5 mg, 5 mg, Intravenous, Q8H PRN    Subjective      Interval events:  continued to have agitation. Required multiple boluses and IV ativan. RN unable to wean precedex. Febrile this AM.     When I entered the room, the patient was sleeping and lying in bed. Son and Multiple family members present at bedside.     Pt somnolent. She is unable to undergo ROS d/t AMS.     Pain assessment:   24 hours: IV dilaudid continuous 0.4mg. Received IV boluses 0.4mg x 4 + 0.6mg x 4= total use 13.6mg.    See summary of discussion below.     Review of Systems:  Pertinent items are noted in subjective.  Bowel Movement         1/17/2024  1100             Stool Count Calculated for I/O: 1        Objective:     Vital Signs:  Blood pressure (!) 87/41, pulse 80, temperature 100.2 °F (37.9 °C), temperature source Temporal, resp. rate 19, height 5' 2\" (1.575 m), weight 184 lb 15.5 oz (83.9 kg), SpO2 95%.  Body  mass index is 33.83 kg/m².  Non-verbal signs of pain present: No  Current Palliative performance scale PPSv2 (%): 40    Physical Exam:  General: Somnolent. Mild respiratory distress   HEENT: Eyes closed   Lungs: Increased effort NC  Abdomen: deferred   Extremities: deferred   Neurologic: somnolent   Psychiatric: JAVAN  Skin: pale     Results:     Hematology:  Lab Results   Component Value Date    WBC 30.8 (H) 01/18/2024    HGB 7.4 (L) 01/18/2024    HCT 25.5 (L) 01/18/2024    .0 01/18/2024     Coags:  Lab Results   Component Value Date    INR 1.07 01/09/2024    PTT 28.4 01/09/2024     Chemistry:  Lab Results   Component Value Date    CREATSERUM 0.95 01/18/2024    BUN 37 (H) 01/18/2024     01/18/2024    K 4.0 01/18/2024    K 4.0 01/18/2024     (H) 01/18/2024    CO2 27.0 01/18/2024     (H) 01/18/2024    CA 10.0 01/18/2024    ALB 2.9 (L) 01/16/2024    ALKPHO 193 (H) 01/16/2024    BILT 1.0 01/16/2024    TP 6.6 01/16/2024     (H) 01/16/2024    ALT 46 01/16/2024    MG 2.7 (H) 01/15/2024    PHOS 3.2 01/16/2024     Imaging:  XR CHEST AP PORTABLE  (CPT=71045)    Result Date: 1/16/2024  CONCLUSION:    Feeding tube has been placed.  The metal tip is excluded from the radiograph, but the portion of catheter visualized extends below the diaphragm into the left upper quadrant, and therefore the feeding tube reaches at least the stomach, although the exact position is not determined.  If exact position needed, consider x-ray of the abdomen.  Redemonstration elevated right diaphragm, and reticular nodular infiltrate throughout the lungs.  The heart is mostly obscured but appears enlarged. LOCATION:  [facility]      Dictated by (CST): Wilman London MD on 1/16/2024 at 1:45 PM     Finalized by (CST): Wilman London MD on 1/16/2024 at 1:46 PM           Summary of Discussion     Dr. Mcarthur was present for a portion of my visit. Family asking questions regarding options. They are frustrated, saddened, and  demoralized by Alysia's condition and lack of treatment options. Addressed questions.   Family would like to see Alysia off of some of the sedating drugs, specifically the precedex and consider rotation from dilaudid. Will trial switching to morphine. Discussed risks and potential benefits, but expressed that this may not be the smoking gun they are hoping for her mental status improvement. Will continue ativan and haldol PRN given delirium.   Discussed prognosis given changes and again discussed hospice/comfort care as an option at this time. Family wishes to talk privately.   Chano and Carmen requesting to discuss code status when she arrives.     Advance Care Planning counseling and discussion:   HC POA Documentation Completed--Document in Epic.   Arya Madden.   POLST FORM Not completed  Full Code      Assessment and Recommendations       Principal Problem:    Metastatic malignant neoplasm, unspecified site (HCC)  Active Problems:    Hypokalemia    Hyponatremia    Leukocytosis, unspecified type    Anemia, unspecified type    Cancer related pain    Palliative care by specialist    Goals of care, counseling/discussion    Encephalopathy    Goals of care:  ongoing      Family is hopeful to have some medications removed or rotated in hopes to communicate with their mom.   Please try to wean precedex   Family acknowledges mental and physical decline since admission.   Code Status: Full Code   POA is arya Madden.     Symptoms  #cancer related pain  - IV dilaudid PCA rotation to morphine PCA   - continuous 2mg   -pt admin 0mg     -clinician bolus 2mg Q30 minutes     #Constipation prevention  -pt unable to take PO at this time. Will discuss further     #nausea   -PRN zofran or compazine     Discussed today's visit with Family, Dr. Mcarthur, and ICU RN .    Palliative Care Follow Up: Palliative care team will Continue to follow while inpatient.    Thank you for allowing Palliative Care services to participate in the care of Alysia ARECHIGA  Beth.    A total of  65  minutes were spent on this consult, which included all of the following: chart review, direct face to face contact, history taking, physical examination, counseling and coordinating care, and documentation.     Jessie Ngo MD  1/18/2024  9:56 AM   Palliative Care Services    The 21st Century Cures Act makes medical notes like these available to patients in the interest of transparency. Please be advised this is a medical document. Medical documents are intended to carry relevant information, facts as evident, and the clinical opinion of the practitioner. The medical note is intended as peer to peer communication and may appear blunt or direct. It is written in medical language and may contain abbreviations or verbiage that are unfamiliar.      Addendum: met with pt's son, his wife, and pt's daughter Carmen in conference room. Provided support and readdress hospice/comfort care. After discussing privately, family has decided on DNR/DNI and verbalized consent to change code status in the computer to DNAR/Select.   Shortly after discussion, Mayra RN noted persistent hypotension with MAPS <55. Family okay to start levophed at low dose until they make a decision regarding POC in the near future.   An additional 50 minutes spent on encounter for a total of 115 minutes. >16 minutes spent on ACP.   Jessie Ngo MD, 01/18/24, 10:43 AM    Addendum: Returned to bedside around 11:50 AM when I heard pt yelling out. Family present and Alysia asking for water and the bathroom (she has rectal tube and hunter in place). Requiring 15L NC; pt is mouth breathing and taking off NC at times per RN. Precedex was decreased to 0.8 prior to episode. She received one bolus since rotation to morphine. RN initiated an additional bolus as pt states she is also in pain; family agreeable. Will continue to wean precedex per RN and utilize haldol/ativan per psych recommendations for delirium and agitation.   An additional 15  minutes spent on encounter.   Jessie Ngo MD, 01/18/24, 12:11 PM    Addendum: spoke with pt and family multiple times over the course of the afternoon. Provided as much support as possible. Family is requesting a second opinion, [facility] oncology consulted and discussed with Joanna INIGUEZ. Pt requiring boluses every hour for pain. Expressed that increased morphine needed; family okay with a slight increase to 3mg/H and no change in bolus. Spiritual care also visited. Continue precedex wean with haldol and ativan PRN.   Total time spent on today's encounter is greater than 3 hours with >46 minutes spent on ACP and EOL discussions.   Jessie Ngo MD, 01/18/24, 3:06 PM

## 2024-01-18 NOTE — PROGRESS NOTES
Pediatric Well Adolescent Exam: 13-15 Years of age    Chief Complaint   Patient presents with   • Well Adolescent       SUBJECTIVE:  Neto PATIÑO is a 15 year old male who presents for a well child exam.  Patient presents  with Father who stayed for the entire visit     Preferred method of results communication:     Cell Phone:   Telephone Information:   Mobile [phone]   Mobile [phone]     Okay to leave a message containing results? Yes    CONCERNS RAISED TODAY: none    RISK ASSESSMENT (HEADSSS):   Home  Lives with: Parents  [x]  YES    []  NO    []  Unknown    Changes in home/work environment?  [x]  YES    []  NO    []  Unknown    Eats meals with family?  [x]  YES    []  NO    []  Unknown    Has family member/adult to turn to for help?  [x]  YES    []  NO    []  Unknown    Is permitted and is able to make independent decisions?  Education  Grade in school:  9th  [x]  Normal    []  Delayed            Academic performance?  [x]  Normal    []  Delayed            Behavior/attention?  [x]  Normal    []  Delayed            Homework?  Eating  Calcium Source: milk  [x]  YES    []  NO    []  Unknown    Eats regular meals including adequate fruits and vegetables?  [x]  YES    []  NO    []  Unknown    Drinks non-sweetened liquids?  []  YES    [x]  NO    []  Unknown    Has concerns about body/appearance?  Activities  [x]  YES    []  NO    []  Unknown    Has friends?  [x]  YES    []  NO    []  Unknown    Has at least 1 hour of physical activity per  day?  [x]  YES    []  NO    []  Unknown    TV/Video time less than 2 hours/day except for homework?  [x]  YES    []  NO    []  Unknown   Volunteers and participates in community activities?  [x]  YES    []  NO    []  Unknown    Involved in other activities (music, drama, etc)?  PROVIDER ONLY QUESTIONS  Drugs  []  YES    [x]  NO    []  Confidential    Uses tobacco, alcohol or drugs?  Safety  [x]  YES    []  NO    []  Unknown    Home is free of violence?  [x]   Psych Liaison deferred consult due to psychiatry team Dr. Kiran seeing patient last night.    YES    []  NO    []  Unknown    Uses safety belts/safety equipment?  [x]  YES    []  NO    []  Unknown    Has peer relationships free of violence?  Sex  []  YES    [x]  NO    []  Confidential   Has had sex?  Suicide/Mental Health  [x]  YES    []  NO    []  Unknown   Has ways to cope with stress?  [x]  YES    []  NO    []  Unknown   Displays self confidence?  []  YES    [x]  NO    []  Unknown   Has problems with sleep?  []  YES    [x]  NO    []  Unknown   Gets depressed, anxious or irritable/ has mood swings?  []  YES    [x]  NO    []  Unknown    Has thoughts about hurting self or considering suicide?    VISION SCREENING:   No exam data present    OBJECTIVE:  PAST HISTORIES:  Allergies, Medications, Medical history, Surgical history, Family history reviewed and updated.  Toxic Exposure:   Tobacco Use: Never           IMMUNIZATION STATUS: Not up to date.  Needed immunizations include: HPV and Influenza.    IMMUNIZATION REACTIONS: None  VARICELLA STATUS: confirmed by vaccine administration    RECENT HEALTH EVENTS:  · Illnesses:   []  YES    [x]  NO    []  N/A  · Hospitalizations:  []  YES    [x]  NO    []  N/A  · Injuries or Accidents:  [x]  YES    []  NO    []  N/A  · Cleared by surgeon     REVIEW OF SYSTEMS:    All systems reviewed and negative except as documented in \"Concerns raised\".    PHYSICAL EXAM:   VITAL SIGNS:   Visit Vitals  /76   Pulse 79   Temp 98.2 °F (36.8 °C) (Other (comment))   Ht 5' 11.2\" (1.808 m)   Wt 66.9 kg (147 lb 8 oz)   SpO2 99%   BMI 20.46 kg/m²    79 %ile (Z= 0.82) based on [facility] (Boys, 2-20 Years) weight-for-age data using vitals from 10/18/2022.  GENERAL:  Well appearing male child.  Alert and active.  SKIN:  Warm, normal turgor. No cyanosis. No rash.  HEAD:  Normocephalic, atraumatic.    EYES:  Conjunctivae appear normal, noninjected, nonicteric, positive red reflex.  NOSE:  Appears normal without drainage.  EARS:  Normal external auditory canals. Tympanic membranes are transparent  with normal landmarks.  THROAT:  Oropharynx with moist mucous membranes without lesions.  NECK:  Supple, no lymphadenopathy or masses.  HEART:  Regular rate and rhythm.  Normal S1, S2.  No murmurs, rubs, gallops.   LUNGS:  Clear to auscultation. No wheezes, rales, rhonchi.  Normal work effort with breathing.  ABDOMEN:  Bowel sounds present. Soft, nontender. No hepatomegaly, splenomegaly or masses.  GENITOURINARY:  Sunny stage V, father present during exam  EXTREMITIES: Symmetrical muscle mass, strength all extremities.  No effusions.   BACK: Spine straight. No costovertebral angle tenderness.      NEUROLOGIC:  Oriented x4. No gross lateralizing signs or focal deficits.  Normal gait.      Assessment:   Well 15 year old male adolescent.    Plan:  1. All parental concerns and questions discussed.  2. Anticipatory guidance provided, handouts given Tobacco, ETOH, illicit drug avoidance  3. Sexual activity & avoidance / safe sex  4. Puberty  5. Self testicular examination  6. Accident prevention  7. School achievement  8. Family/Peer relationships  9. Regular physical activity  10. Healthy food choices  11. Immunizations per orders.  Risks, benefits, and side effects discussed.  12. Orders for immunizations given today per the recommended schedule.  13. Vaccine Information Statement for Immunizations given  14. Cleared for sports    Follow up:Return in about 1 year (around 10/18/2023) for Well Visit .

## 2024-01-18 NOTE — PROGRESS NOTES
[facility]    Progress Note     Alysia aCmpos Patient Status:  Inpatient    7/15/1970 MRN [id number]   Location [facility] 4NW-A Attending Harshal Aponte MD   Hosp Day # 10 PCP Renetta Santos DO     Follow up for: The primary encounter diagnosis was Metastatic malignant neoplasm, unspecified site (HCC). Diagnoses of Hyponatremia, Leukocytosis, unspecified type, and Anemia, unspecified type were also pertinent to this visit.      Interval History/Subjective:     Pt seen and examined.  Now more calm in posey.  Precedex being weaned.  On 15 L O2.      Vital signs:  Temp:  [98 °F (36.7 °C)-100.8 °F (38.2 °C)] 98.7 °F (37.1 °C)  Pulse:  [] 77  Resp:  [18-43] 23  BP: ()/(40-91) 83/42  SpO2:  [92 %-97 %] 96 %  FiO2 (%):  [93 %] 93 %    Physical Exam:      General: More calm and less agitated   HEENT: Normocephalic atraumatic. Moist mucous membranes; Sclera anicteric. EOMI. NGT in place.  Neck: Supple, +JVD  Respiratory: Diminished at bilateral bases, otherwise CTA; ;reg resp rate & effort, no wheezes/crackles   Cardiovascular: S1, S2. Tachycardic. No murmurs appreciable   Chest and Back: No tenderness or deformity.  Abdomen: distended with generalized TTP; no guarding/rebound tenderness; +hepatomegaly  Neurologic: Drowsy but arousable to voice; No focal neurological deficits. CNII-XII grossly intact.  Musculoskeletal: Moves all extremities.  Extremities: +LE edema   Integument: No rashes or lesions.   Psychiatric: Appropriate mood and affect.       Diagnostic Data:      Labs:  Recent Labs   Lab 24  0702 01/15/24  0704 01/15/24  2027 24  0503 24  0524 24  0411   WBC 32.2* 29.6* 34.2* 32.2* 27.4* 30.8*   HGB 7.6* 7.6* 8.5* 7.7* 7.5* 7.4*   MCV 76.6* 75.9* 74.9* 74.7* 77.2* 76.8*   .0 329.0 371.0 304.0 313.0 285.0   BAND 6 8 1 5 1  --        Recent Labs   Lab 01/15/24  2027 01/16/24  0503 24  0931 24  1946 24  0524 24  0411   GLU 96 96   < > 107*  131* 122*   BUN 25* 26*   < > 34* 33* 37*   CREATSERUM 0.65 0.71   < > 0.87 0.97 0.95   CA 10.5* 9.9   < > 10.3* 9.8 10.0   ALB 3.0*  3.0* 2.9*  --   --   --   --     140   < > 146* 146* 145   K 4.1 3.8   < > 3.6 3.4* 4.0  4.0    109   < > 114* 113* 114*   CO2 19.0* 25.0   < > 28.0 27.0 27.0   ALKPHO 211*  211* 193*  --   --   --   --    *  120* 117*  --   --   --   --    ALT 48  48 46  --   --   --   --    BILT 1.0  1.0 1.0  --   --   --   --    TP 6.9  6.9 6.6  --   --   --   --     < > = values in this interval not displayed.       No results for input(s): \"PTP\", \"INR\" in the last 168 hours.      No results for input(s): \"TROP\", \"CK\" in the last 168 hours.         Imaging: Imaging data reviewed in Epic.    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   Hyponatremia  Acute on Chronic Anemia      Plan    Acute hypoxic respiratory failure   - suspect multifactorial (anesthesia, pulm masses, fluid overload, narcotics)   - CTA chest neg for PE  - s/p IV lasix x 1 on 1/15  - RVP neg  - wean O2 as tolerated     Acute encephalopathy with agitation   - suspect due to anesthesia and narcotics  - ABG with pH 7.30, PCO2 49, PO2  102  - ammonia 55  -  alk phose 193  - MRI brain neg for acute pathology   - consider narcan given high dose opiates on PCA  - stop nightly seroquel   - start ativan, wean precedex gtt  - hold off on neuro c/s  for now  - monitor for improvement     Diplopia  -Unclear if secondary to  sedation given increased pain requirements  -Given evidence of widely metastatic disease will need to check MRI brain to rule out metastases, pending  -- MRI brain neg for mets.     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   -- CT Abd w/ multiple pulmonary, hepatic metastases, omental mass, 7.8cm left ovarian mass  -- CT Chest performed 1/8 w/ innumerable pulmonary nodules  w/ large confluent masses at bilateral bases  -- Gyn Onc consulted, plan discussed  -Oncology consult, recommendations reviewed, pending results of biopsy  - US pelvis unremarkable   -Status post US guided biopsy of liver 1/9/2024, pathology pending  -Continue Norco, Dilaudid as needed for pain  -1/10: Based on patient's tenuous clinical status, may require first round of chemotherapy as inpatient per oncology, pending initial pathology results  - MRI brain neg for mets  - chemo unlikely to be of benefit     Hyponatremia-resolved   Possible SIADH   -- likely hypovolemic in the setting of poor PO intake, nausea/vomiting  -- Na 126 on admission, s/p 1L NS bolus  - Sodium correcting appropriately, change BMP to BID  --resolved s/p tolvaptan x1 1/12, change mIVF to TKO      Acute on Chronic Anemia  -- stable  -- recent baseline Hgb 7.7-8.0, 7.0 on admission  -- no e/o active bleeding or hemodynamic instability  -- 1 unit PRBC given 1/9 with minimal response, additional unit ordered today  -- Iron studies consistent with deficiency, Venofer ordered  - CTM, transfuse to maintain goal hemoglobin >7.0  - GI consulted due to persistent anemia, elevated CEA levels  -- EGD/Colonoscopy w/o active bleeding or clear e/o  malignancy, biopsies taken, path pending; no further interventions per GI   -- 1/14: Hgb now stable >48 hours, resume lovenox for dvt prophylaxis     LE edema  - pt net + 7.8 L since admission   - stop IVFs, PRN diuretics   - BNP (1934), TTE with LVEF wnl    Leukocytosis   - significant but stable  - procal 0.65  - lactate normalized   - cont empiric zosyn (1/15- )  - f/u Bcx's  - UA neg    DVT Mechanical Prophylaxis:   SCDs,    DVT Pharmacologic Prophylaxis   Medication    enoxaparin (Lovenox) 40 MG/0.4ML SUBQ injection 40 mg              Code Status: FULL     Dispo: inpatient care in the ICU; EVA >2 midnights    Family discussion ongoing.   Pt now DNAR/select.      Maikel Valera [facility]   [phone]      This note was created using voice recognition technology. It may include inadvertent transcriptional errors. Any such errors should be contextually interpreted and should not be taken to alter the content or the meaning.     Note to Patient: The 21st Century Cures Act makes medical notes like these available to patients in the interest of transparency. However, be advised this is a medical document. It is intended as peer to peer communication. It is written in medical language and may contain abbreviations or verbiage that are unfamiliar. It may appear blunt or direct. Medical documents are intended to carry relevant information, facts as evident, and the clinical opinion of the practitioner and not intended to be the primary source of your information.  Please refer directly to myself or clinical staff for information regarding plan of care.

## 2024-01-18 NOTE — PHYSICAL THERAPY NOTE
Following for PT.  Pt remains inappropriate.  Will f/u next week if pt becomes appropriate to participate in skilled intervention.

## 2024-01-18 NOTE — PROGRESS NOTES
01/18/24 1448   Clinical Encounter Type   Visited With Patient and family together;Health care provider   Routine Visit Follow-up   Continue Visiting   (Check in with RN daily as possible to determine patient/family needs.)   Patient's Supportive Strategies/Resources Family and [employer]   Referral From Physician   Referral To    Taxonomy   Intended Effects Journeying with someone in the grief process   Methods Collaborate with care team member;Offer emotional support   Interventions Acknowledge current situation;Active listening;Ask guided questions;[place];Identify supportive relationship(s)     Responded to request for care.  Came alongside family (patient's mother, 2 daughters, son, daughter in-law) as they sit pierce at bedside.  Provided a compassionate, listening presence as they were given difficult news today.  Offered hospitality.  Also provided direct contact # for spiritual care services.  Spiritual Care support can be requested via an Epic consult.    For urgent/immediate needs, please contact the On Call  at ext. [phone].

## 2024-01-18 NOTE — PAYOR COMM NOTE
1/17 & 1/18    CONTINUED STAY REVIEW    Payor: [employer] OPEN ACCESS   Subscriber #:  [id number]  Authorization Number: [id number]    Admit date: 1/8/24  Admit time:  2:50 PM    Admitting Physician: Harshal Aponte MD  Attending Physician:  Maikel Valera DO  Primary Care Physician: Renetta Santos DO    MEDICATIONS ADMINISTERED IN LAST 1 DAY:  acetaminophen (Ofirmev) 10 mg/mL infusion premix 1,000 mg       Date Action Dose Route User    1/18/2024 0644 New Bag 1,000 mg Intravenous Adrienne Rubio RN          acetaminophen (Ofirmev) 10 mg/mL infusion premix 1,000 mg       Date Action Dose Route User    1/18/2024 1415 New Bag 1,000 mg Intravenous Yazmin Gama RN          dexmedeTOMIDine (Precedex) 800 mcg in sodium chloride 0.9% 100 mL infusion       Date Action Dose Route User    1/18/2024 1251 Rate/Dose Change 0.6 mcg/kg/hr × 83.6 kg (Dosing Weight) Intravenous Yazmin Gama RN    1/18/2024 1043 Rate/Dose Change 0.8 mcg/kg/hr × 83.6 kg (Dosing Weight) Yazmin Tillman RN    1/18/2024 1029 Rate/Dose Change 1 mcg/kg/hr × 83.6 kg (Dosing Weight) Yazmin Tillman RN    1/18/2024 0937 New Bag 1.2 mcg/kg/hr × 83.6 kg (Dosing Weight) Yazmin Tillman RN    1/18/2024 0910 Rate/Dose Change 1.3 mcg/kg/hr × 83.6 kg (Dosing Weight) Intravenous Yazmin Gama RN    1/18/2024 0358 New Bag 1.5 mcg/kg/hr × 83.6 kg (Dosing Weight) Intravenous Adrienne Rubio RN    1/17/2024 2350 Rate/Dose Change 1.5 mcg/kg/hr × 83.6 kg (Dosing Weight) Intravenous Adrienne Rubio RN    1/17/2024 2328 Rate/Dose Change 1.4 mcg/kg/hr × 83.6 kg (Dosing Weight) Intravenous Adrienne Rubio RN    1/17/2024 2058 New Bag 1.5 mcg/kg/hr × 83.6 kg (Dosing Weight) Intravenous Adrienne Rubio RN    1/17/2024 1529 Rate/Dose Change 1.5 mcg/kg/hr × 83.6 kg (Dosing Weight) Intravenous Yazmin Gama RN    1/17/2024 1500 Rate/Dose Change 1.2 mcg/kg/hr × 83.6 kg (Dosing Weight)  Intravenous Yazmin Gama RN          dextrose 5% infusion       Date Action Dose Route User    1/18/2024 1011 New Bag (none) Intravenous Yazmin Gama RN    1/17/2024 1748 New Bag (none) Intravenous Yazmin Gama RN          enoxaparin (Lovenox) 40 MG/0.4ML SUBQ injection 40 mg       Date Action Dose Route User    1/17/2024 2043 Given 40 mg Subcutaneous (Left Lower Abdomen) Adrienne Rubio RN          HYDROmorphone in sodium chloride 0.9% (Dilaudid) 20 mg/100mL PCA premix       Date Action Dose Route User    1/18/2024 0245 New Bag 0.4 mg/hr Intravenous Adrienne Rubio RN          HYDROmorphone 0.4 mg BOLUS FROM PCA       Date Action Dose Route User    1/17/2024 1500 Bolus from Bag 0.4 mg Intravenous Yazmin Gama RN          HYDROmorphone 0.6 mg BOLUS FROM PCA       Date Action Dose Route User    1/18/2024 0545 Bolus from Bag 0.6 mg Intravenous Adrienne Rubio RN    1/18/2024 0400 Bolus from Bag 0.6 mg Intravenous Adrienne Rubio RN    1/18/2024 0240 Bolus from Bag 0.6 mg Intravenous Adrienne Rubio RN    1/17/2024 2350 Bolus from Bag 0.6 mg Intravenous Adrienne Rubio RN          LORazepam (Ativan) 2 mg/mL injection 1 mg       Date Action Dose Route User    1/17/2024 1534 Given 1 mg Intravenous Yazmin Gama RN          LORazepam (Ativan) 2 mg/mL injection 1 mg       Date Action Dose Route User    1/18/2024 1233 Given 1 mg Intravenous Yazmin Gama RN    1/17/2024 2253 Given 1 mg Intravenous Adrienne Rubio RN          morphINE in sodium chloride 0.9% 100 mg/100mL PCA premix       Date Action Dose Route User    1/18/2024 1011 New Bag 2 mg Intravenous Yazmin Gama RN          morphINE 2 mg BOLUS FROM PCA       Date Action Dose Route User    1/18/2024 1359 Bolus from Bag 2 mg Intravenous Yazmin Gama RN    1/18/2024 1328 Bolus from Bag 2 mg Intravenous Gala Mcnamara, BECKY    1/18/2024 1203 Bolus from Bag 2 mg Intravenous Harmony Jha, BECKY    1/18/2024 1054  Bolus from Bag 2 mg Intravenous Yazmin Gama RN          norepinephrine (Levophed) 8 mg in dextrose 5% 250 mL infusion       Date Action Dose Route User    1/18/2024 1043 New Bag 1 mcg/min Intravenous Yazmin Gama RN          piperacillin-tazobactam (Zosyn) 3.375 g in dextrose 5% 100 mL IVPB-ADDV       Date Action Dose Route User    1/18/2024 1415 New Bag 3.375 g Intravenous Yazmin Gama RN    1/18/2024 0608 New Bag 3.375 g Intravenous Adrienne Rubio RN    1/17/2024 2324 New Bag 3.375 g Intravenous Adrienne Rubio RN            Vitals (last day)       Date/Time Temp Pulse Resp BP SpO2 Weight O2 Device O2 Flow Rate (L/min) [facility]    01/18/24 1400 100.4 °F (38 °C) 126 28 167/77 90 % -- High flow nasal cannula 15 L/min     01/18/24 1300 -- 113 27 115/48 94 % -- High flow nasal cannula 15 L/min     01/18/24 1240 -- 115 29 122/65 91 % -- -- --     01/18/24 1200 99.4 °F (37.4 °C) 112 17 164/103 85 % -- High flow nasal cannula 15 L/min     01/18/24 1100 -- 113 25 129/92 88 % -- High flow nasal cannula 10 L/min     01/18/24 1029 -- 77 23 83/42 96 % -- -- --     01/18/24 1029 -- -- -- -- -- -- High flow nasal cannula 8 L/min HA    01/18/24 1000 -- 76 21 82/45 96 % -- -- --     01/18/24 0944 98.7 °F (37.1 °C) -- -- -- -- -- -- --     01/18/24 0905 100.2 °F (37.9 °C) 80 19 87/41 95 % -- -- --     01/18/24 0900 -- 78 20 84/43 96 % -- -- --     01/18/24 0800 100.2 °F (37.9 °C) 81 21 87/40 95 % -- High flow nasal cannula 8 L/min JU    01/18/24 0700 -- 94 25 111/57 92 % -- -- --     01/18/24 0640 100.8 °F (38.2 °C) -- -- -- -- -- -- -- EK    01/18/24 0600 99.9 °F (37.7 °C) 95 26 98/52 92 % -- -- -- EK    01/18/24 0549 -- -- -- -- -- 184 lb 15.5 oz -- --     01/18/24 0500 -- 84 27 94/51 97 % -- -- -- EK    01/18/24 0458 98.9 °F (37.2 °C) -- -- -- -- -- -- -- EK    01/18/24 0400 99.9 °F (37.7 °C) 100 26 117/52 93 % -- High flow nasal cannula 8 L/min EK    01/18/24 0300 -- 93 28 107/51 96  % -- -- -- EK    01/18/24 0200 -- 89 35 97/49 94 % -- -- --     01/18/24 0100 -- 87 26 96/46 95 % -- -- -- EK    01/18/24 0028 -- -- -- -- 92 % -- High flow nasal cannula 8 L/min     01/18/24 0000 98.6 °F (37 °C) 94 31 112/45 93 % -- High flow nasal cannula 6 L/min     01/17/24 2300 -- 116 43 144/80 93 % -- -- --     01/17/24 2200 -- 83 26 98/48 97 % -- -- --     01/17/24 2100 -- 119 26 155/84 94 % -- -- --     01/17/24 2000 98.5 °F (36.9 °C) 114 25 144/91 96 % -- High flow nasal cannula 6 L/min     01/17/24 1900 -- 91 26 106/53 96 % -- High flow nasal cannula 6 L/min     01/17/24 1800 -- 87 18 99/58 95 % -- High flow nasal cannula 6 L/min     01/17/24 1700 -- 86 28 103/55 95 % -- High flow nasal cannula 6 L/min     01/17/24 1600 -- 93 25 100/50 95 % -- High flow nasal cannula 6 L/min     01/17/24 1500 -- 125 33 166/82 93 % -- High flow nasal cannula 6 L/min     01/17/24 1400 -- 114 36 149/71 94 % -- High flow nasal cannula 6 L/min     01/17/24 1300 -- 125 26 -- 93 % -- High flow nasal cannula 6 L/min     01/17/24 1200 98 °F (36.7 °C) 115 26 144/68 93 % -- High flow nasal cannula 5 L/min     01/17/24 1100 -- 99 33 107/58 95 % -- High flow nasal cannula 5 L/min     01/17/24 1000 -- 84 21 101/46 97 % -- High flow nasal cannula 5 L/min     01/17/24 0912 -- -- -- -- -- -- High flow nasal cannula 5 L/min DL    01/17/24 0900 -- 92 26 103/53 98 % -- -- -- JU    01/17/24 0800 97.9 °F (36.6 °C) 92 20 97/57 95 % -- Bi-PAP -- JU    01/17/24 0700 -- 79 25 91/49 93 % -- -- -- JU    01/17/24 0630 -- 78 23 96/51 95 % -- -- -- TC    01/17/24 0600 -- 85 21 99/50 93 % -- -- -- TC    01/17/24 0530 -- 97 30 118/70 92 % 180 lb 1.9 oz -- -- TC    01/17/24 0500 -- 84 20 98/51 91 % -- -- -- TC    01/17/24 0430 -- 91 23 104/59 90 % -- -- -- TC    01/17/24 0400 -- 90 25 103/55 92 % -- -- -- TC    01/17/24 0330 98.9 °F (37.2 °C) 106 35 120/62 93 % -- Bi-PAP -- TC    01/17/24 0300 -- 99 28 109/56 92 % -- -- --      01/17/24 0230 -- 110 35 145/82 94 % -- -- --     01/17/24 0215 -- 110 32 123/70 91 % -- -- --     01/17/24 0200 -- 104 27 115/58 93 % -- -- --     01/17/24 0145 -- 93 23 101/48 92 % -- -- --     01/17/24 0130 -- 98 21 106/53 92 % -- -- --     01/17/24 0115 -- 119 20 175/92 -- -- -- --     01/17/24 0100 -- 117 23 162/84 92 % -- -- --     01/17/24 0045 -- 120 28 144/82 -- -- -- --     01/17/24 0030 -- 105 28 131/73 92 % -- -- --     01/17/24 0015 -- 94 21 135/63 98 % -- -- --     01/17/24 0000 -- 103 36 134/73 94 % -- -- --        Blood Transfusion Record       Product Unit Status Volume Start End            Transfuse RBC       23  [id number]  [id number] Completed 01/10/24 1317 400 mL 01/10/24 1038 01/10/24 1317       23  [id number]  [id number] Completed 01/12/24 0751 358.33 mL 01/09/24 0830 01/12/24 0745                1/17 PULMONARY NOTE       Subjective:  Weaned from AVAPS this morning. Awake, oriented to self and place. Appears SOB, moaning  in pain and complaining of increased pain with urination-hunter in place.      Objective:     Medications:   lactulose  20 g Oral TID    piperacillin-tazobactam  3.375 g Intravenous Q8H    enoxaparin  40 mg Subcutaneous Q24H    tamsulosin  0.4 mg Oral Nightly    benzonatate  100 mg Oral Q8H    acetaminophen  650 mg Oral Q8H    guaiFENesin ER  600 mg Oral BID    multivitamin  1 tablet Oral Daily         FiO2 (%):  [30 %] 30 % ,  5 L NC        Intake/Output Summary (Last 24 hours) at 1/17/2024 1218  Last data filed at 1/17/2024 1200      Gross per 24 hour   Intake 2156.35 ml   Output 1638 ml   Net 518.35 ml         /68   Pulse 115   Temp 98 °F (36.7 °C) (Temporal)   Resp 26   Ht 157.5 cm (5' 2\")   Wt 180 lb 1.9 oz (81.7 kg)   LMP  (LMP Unknown)   SpO2 93%   BMI 32.94 kg/m²      Physical Exam:  General Appearance: Lethargic, conversational dyspnea, moaning in pain with attempting to urinate  Neck: No JVD, neck supple, no adenopathy,  trachea midline, no carotid bruits  Lungs: Clear to auscultation bilaterally, respirations unlabored  Heart: Regular rate and rhythm, S1 and S2 normal, no murmur, rub or gallop  Abdomen: Distended, tender to palpation. Hypoactive bowel sounds.  Extremities: Lower Extremities significantly edematous 3+ pitting.   Pulses: 2+ and symmetric all extremities  Skin: Skin color, texture, turgor normal for ethnicity, no rashes or lesions, warm and dry         Recent Labs   Lab 01/17/24  0524   RBC 3.37*   HGB 7.5*   HCT 26.0*   MCV 77.2*   MCH 22.3*   MCHC 28.8*   RDW 24.7   NEPRELIM 21.90*   WBC 27.4*   .0                Recent Labs   Lab 01/11/24  0704 01/11/24  1622 01/15/24  2027 01/16/24  0503 01/16/24  0931 01/16/24  1946 01/17/24  0524   *   < > 96 96 95 107* 131*   BUN 14   < > 25* 26* 28* 34* 33*   CREATSERUM 0.57   < > 0.65 0.71 0.79 0.87 0.97   CA 9.3   < > 10.5* 9.9 10.2* 10.3* 9.8   ALB 3.1*  --  3.0*  3.0* 2.9*  --   --   --    *   < > 141 140 142 146* 146*   K 3.6   < > 4.1 3.8 3.8 3.6 3.4*   CL 99   < > 111 109 111 114* 113*   CO2 21.0   < > 19.0* 25.0 24.0 28.0 27.0   ALKPHO 211*  --  211*  211* 193*  --   --   --    AST 96*  --  120*  120* 117*  --   --   --    ALT 39  --  48  48 46  --   --   --    BILT 0.9  --  1.0  1.0 1.0  --   --   --    TP 7.0  --  6.9  6.9 6.6  --   --   --     < > = values in this interval not displayed.          Recent Labs   Lab 01/17/24  0856   ABGPHT 7.38   XXKCJJ3S 45   ANFMQ0A 78*   ABGHCO3 26.0   ABGBE 1.3   TEMP 98.6   MOE Positive   SITE Right Radial   DEV Bi-PAP   THGB 8.5*          Assessment/Plan:  Acute hypoxic respiratory failure: Possibly related to lymphangitic spread, pulmonary masses. Initially thought pulmonary edema-now less likely   - Increased O2 needs post anesthesia for MRI on 1/16  - Weaned fro AVAPS this morning. Currently requiring 5L/NC, wean further as tolerated.   - Chest xray with bilateral pulmonary masses with small right  and trace left pleural effusions.Does not appear to be pulm edema  - CTA chest negative for PE  - RVP negative  - Wean o2 as able     Acute encephalopathy: Unclear etiology. May be multifactorial. Delirium may be contributing given hallucinations and lack of sleep, narcotics. May need to rule out further CNS involvement  - Continuous dilaudid PCA could also be a contributing factor  - MRI brain negative for acute process or mets  - Ammonia elevated, continue lactulose. May be contributing, but not likely to be the only factor  - ABG without hypercapnea  - Meds reviewed by pharmacy. Holding seroquel and buspar dicontinued  - Requiring precedex for anxiety. Will start ativan and wean gtt  - Re-orient frequently and PRN  - Consider neuro consult and/or LP given persistent leukocytosis      Ovarian mass with hepatic and pulmonary metastases  - New diagnosis this admit, CT abd with multiple pulmonary, hepatic metastases, omental mass, 7.8cm L ovarian mass.  - S/p ultrasound guiding biopsy of liver 1/9/23, pathology with prelim results malignant  - MRI brain without metastases   - Gyn/Oncology following-suspect GYN source but awaiting path results. Plan for possible chemo tomorrow or Friday pending improvement in SOB/O2 needs.    - pain control     Abdominal pain/distention, transaminitis: In setting of hepatic metastases  - LFTs elevated  - Abd ultrasound 1/14 with trace ascites, KUB without acute process.  - CT abdomen with hepatomegaly  - Dilaudid PCA, norco PRN  - Bowel regimen  - Daily CMP     Significant LE edema  - Pro BNP elevated   - Net (+) since admit  -s/p lasix on 1/15. Holding on further diuresis as CXR does not appear to be FVO  - TTE with hyperdynamic EF (70-75%), IVC dilated  - Elevate LEs as able     Transaminitis: In setting of hepatic metastases  -LFTs elevated  -Daily CMP       Urinary retention  - Flomax  - Urology following, hunter in plae     ID: Leukocytosis  - WBC has consistently been elevated  >46108 this admission- appears stable, slightly improved today 27.4  - Afebrile, monitor fever curve  - Pct 0.65  - Lactic acid 2.2, now normalized  - Blood cultures NGTD  - MRSA negative  - UA negative  - Zosyn (1/15- )  - Consider ID consult if counts worsen      Tachycardia: Likely multifactorial due to ongoing pain, hypoxia  - EKG showed afib (No history and currently in NSR/ST). Hold on a/c  - Received low dose BB x1 overnight  - monitor on tele  - pain mgt as above     Microcytic Anemia 2/2 to iron deficiency  - EGD/colonoscopy 1/11/23 without active bleed  - No evidence of active bleeding  - Baseline hgb 7.5-8.0.  - S/p 1u prbcs 1/10/23, 1u prbcs 1/12/23  - s/p IV venofer (1/9-1/11)  - Transfuse to maintain HGB >7.  - Daily CBC     F/E/N:    - Repelte electrolyte per protocl  - dobhoff-TFs (had poor nutrition with po intake)     Proph:  - SQ Lovenox  - SCDs  - PT/OT     Dispo:   - Full code  - Palliative care following  - ICU monitoring. Daughter updated at bedside     1/17     PSYCH CONSULT     Date of Admission: 1/8/24  Date of Consult: 1/17/24  Reason for Consultation: Input on delirium inducing meds, altered mental status     Impression:  Primary Psychiatric Diagnosis:  Acute delirium/encephalopathy due to acute hypoxic resp failure, severe pain, meds (ie anesthesia on 1/15, Dilaudid, precedex gtt, haldol, ativan), elevated ammonia, +/- poor sleep quality. Brain MRI without evidence of mets or stroke.      Anxiety due to general medical condition.      Pertinent Medical Diagnoses:  Metastatic cancer (liver and lung mets), possibly from ovarian primary. Acute hypoxic resp failure. Anemia.     Recommendations:  1) Taper down precedex gtt as able.     2) Start Ativan 1mg IV every 4hrs PRN anxiety/agitation/insomnia. 1mg IV helped calm her per RN. The 1mg oral was ineffective. Use judiciously since benzos will often worsen delirium.      3) Start Haldol 2mg IV every 4 hrs PRN agitation/psychosis. The 1mg IV  did not help with agitation per report.     4) Talked with the palliative service who will manage the opioids. Pain has to be treated, but the opioids will worsen her delirium. It will be a fine balance- treating pain while being judicious to prevent worsening resp failure.      5) Recommend a family conference with all the services. I do not think there is a way to treat her pain and alleviate suffering without her being persistently delirious. Morphine equivalents are above 100 per day. Family wants her to get chemo, but this may not be possible due to her resp status and the need for sedating meds to prevent suffering.           1/18 HEMA/ONC NOTE    Interval History--  Patient was seen this AM with multiple family members at bedside. I did also discuss with pathology with no etiology based on their stains but additional stains should be out soon. She was still agitated overnight, fever this AM.       Physical Exam:   Blood pressure 94/51, pulse 84, temperature 98.9 °F (37.2 °C), temperature source Temporal, resp. rate (!) 27, height 5' 2\" (1.575 m), weight 184 lb 15.5 oz (83.9 kg), SpO2 97%.                General: Patient is alert; appeared uncomfortable, but improved after she pressed for her pain med                Chest: Clear to auscultation. + cough                 Heart: Regular rate                  Abdomen: distended.                Extremities: + b/l LE edema                 Neurological: Grossly intact.                                    Laboratory Data:          Lab Results   Component Value Date     WBC 30.8 01/18/2024     HGB 7.4 01/18/2024     HCT 25.5 01/18/2024     .0 01/18/2024     CREATSERUM 0.95 01/18/2024     BUN 37 01/18/2024      01/18/2024     K 4.0 01/18/2024     K 4.0 01/18/2024      01/18/2024     CO2 27.0 01/18/2024      01/18/2024     CA 10.0 01/18/2024     mpression and Plan:        Patient Active Problem List   Diagnosis    Hypokalemia    Metastatic  malignant neoplasm, unspecified site (HCC)    Hyponatremia    Leukocytosis, unspecified type    Anemia, unspecified type    Cancer related pain    Palliative care by specialist    Goals of care, counseling/discussion    Encephalopathy         Impression  Metastatic malignancy based on imaging, unclear primary lesion.   Widely metastatic with lymphangitic spread in lungs, liver metastases, pelvic metastases, ovarian mass.  Liver biopsy was done 1/9 per IR.  Prelim pathology is malignant, but not much further clarification is yet available.   Probably carcinoma based on IHC.  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   She needed one unit PRBCs. Venofer x 3 has completed earlier this week.  Tachycardia, SOB.   CTA negative for PE  Pain control on PCA.  Palliative care following      Plan:  I had a long discussion with the son as well as multiple family members regarding her status. I was hopeful a few days ago that we would be able to have a pathology result back to identify etiology and then think about inpatient chemotherapy or potentially use carboplatin + paclitaxel and treat for cancer of unknown origin however no clear source has yet to be identified, additional stains are in process. I discussed that her malignancy regardless of etiology, is aggressive and have already seen progression with scans while inpatient which has caused her to have worsening shortness of breath and now with fever and possible infection  The family was very frustrated on what has been done thus far and that no treatment has been given since admission and I discussed that we always try to find the etiology prior to treatment given all malignancies have different regimens and would cause more harm than good. I discussed that chemotherapy would cause side effects and even if the regimen was correct, I do not think based on how aggressive this is, that we would see any sort of response  quickly and usually would take about 2-3 months to see if there is a response. I also tried to state that even if we did chemotherapy last week, I do not think it would've helped significantly to the point she wouldn't have her current symptoms  I discussed with palliative as well and no decision on hospice is needed today but I do think hospice is the appropriate choice and I will see her tomorrow again and may have an update from [facility] as well.     1/18 PULMONARY NOTE       Subjective:  On 15 L HFNC. Somnolent. Precedex gtt and morphine gtt. GOC discussions ongoing with palliative care and family this morning. Code status changed to DNAR select.     Objective:     Medications:   acetaminophen  1,000 mg Intravenous Q8H    piperacillin-tazobactam  3.375 g Intravenous Q8H    enoxaparin  40 mg Subcutaneous Q24H    tamsulosin  0.4 mg Oral Nightly    multivitamin  1 tablet Oral Daily         FiO2 (%):  [93 %] 93 % ,  5 L NC        Intake/Output Summary (Last 24 hours) at 1/18/2024 1246  Last data filed at 1/18/2024 1200      Gross per 24 hour   Intake 1540.4 ml   Output 1355 ml   Net 185.4 ml         /65   Pulse 115   Temp 99.4 °F (37.4 °C) (Temporal)   Resp (!) 29   Ht 157.5 cm (5' 2\")   Wt 184 lb 15.5 oz (83.9 kg)   LMP  (LMP Unknown)   SpO2 91%   BMI 33.83 kg/m²              Recent Labs   Lab 01/18/24  0411   RBC 3.32*   HGB 7.4*   HCT 25.5*   MCV 76.8*   MCH 22.3*   MCHC 29.0*   RDW 24.9   NEPRELIM 23.68*   WBC 30.8*   .0               Recent Labs   Lab 01/15/24  2027 01/16/24  0503 01/16/24  0931 01/16/24  1946 01/17/24  0524 01/18/24  0411   GLU 96 96   < > 107* 131* 122*   BUN 25* 26*   < > 34* 33* 37*   CREATSERUM 0.65 0.71   < > 0.87 0.97 0.95   CA 10.5* 9.9   < > 10.3* 9.8 10.0   ALB 3.0*  3.0* 2.9*  --   --   --   --     140   < > 146* 146* 145   K 4.1 3.8   < > 3.6 3.4* 4.0  4.0    109   < > 114* 113* 114*   CO2 19.0* 25.0   < > 28.0 27.0 27.0   ALKPHO 211*   211* 193*  --   --   --   --    *  120* 117*  --   --   --   --    ALT 48  48 46  --   --   --   --    BILT 1.0  1.0 1.0  --   --   --   --    TP 6.9  6.9 6.6  --   --   --   --            Assessment/Plan:  Acute hypoxic respiratory failure: Possibly related to lymphangitic spread, pulmonary masses. Initially thought pulmonary edema-now less likely   - Currently on 15 L HFNC. O2 needs increasing  - Chest xray with bilateral pulmonary masses with small right and trace left pleural effusions. Does not appear to be pulm edema  - CTA chest negative for PE  - RVP negative  - Wean o2 as able     Acute encephalopathy: Unclear etiology. May be multifactorial. Delirium may be contributing given hallucinations and lack of sleep, narcotics. May need to rule out further CNS involvement  - Continuous pain medication may be a contributing factor  - MRI brain negative for acute process or mets  - Ammonia elevated, continue lactulose. May be contributing, but not likely to be the only factor  - ABG without hypercapnea  - Meds reviewed by pharmacy. Holding seroquel and buspar dicontinued  - Requiring precedex for anxiety and Xanax PRN  - Re-orient frequently and PRN  - Psych on consult     Ovarian mass with hepatic and pulmonary metastases  - New diagnosis this admit, CT abd with multiple pulmonary, hepatic metastases, omental mass, 7.8cm L ovarian mass.  - S/p ultrasound guiding biopsy of liver 1/9/23, pathology with prelim results malignant  - MRI brain without metastases   - Gyn/Oncology following-suspect GYN source but awaiting path results.  - pain control     Abdominal pain/distention, transaminitis: In setting of hepatic metastases  - LFTs elevated  - Abd ultrasound 1/14 with trace ascites, KUB without acute process.  - CT abdomen with hepatomegaly  - Morphine PCA, norco PRN  - Bowel regimen  - Daily CMP     Significant LE edema  - Pro BNP elevated   - Net (+) since admit  -s/p lasix on 1/15. Holding on further  diuresis as CXR does not appear to be FVO  - TTE with hyperdynamic EF (70-75%), IVC dilated     Transaminitis: In setting of hepatic metastases  -LFTs elevated  -Daily CMP       Urinary retention  - Flomax  - Urology following, hunter in place     ID: Leukocytosis  - WBC has consistently been elevated >08819 this admission- appears stable  - Afebrile, monitor fever curve  - Pct 0.65  - Lactic acid 2.2, now normalized  - Blood cultures NGTD  - MRSA negative  - UA negative  - Zosyn (1/15- )        Tachycardia: Likely multifactorial due to ongoing pain, hypoxia  - EKG showed afib (No history and currently in NSR/ST). Hold on a/c  - Received low dose BB x1 overnight  - monitor on tele  - pain mgt as above     Microcytic Anemia 2/2 to iron deficiency  - EGD/colonoscopy 1/11/23 without active bleed  - No evidence of active bleeding  - Baseline hgb 7.5-8.0.  - S/p 1u prbcs 1/10/23, 1u prbcs 1/12/23  - s/p IV venofer (1/9-1/11)  - Transfuse to maintain HGB >7.  - Daily CBC  -Resume lovenox SQ     F/E/N:    - Repelte electrolyte per protocl  - Pulled out dobhoff. Hold off on replacement until GOC determined     Proph:  - SQ Lovenox  - SCDs  - PT/OT     Dispo:   - DNAR select  - Palliative care following-GOC discussions on-going with Dr. Ngo  - ICU monitoring

## 2024-01-18 NOTE — CM/SW NOTE
Spoke with Masha at [facility] [phone] to see if her team would be able to provide any resources/support for pt's 15 year old daughter, as pt's older daughter Carmen had requested yesterday. Masha states that she feels pt would benefit from finding a more consistent, long term support person in the outpatient setting. Their team is able to meet with pt and can provide journals/card games/etc related to grief/support, but she feels that pt would benefit from a more constant source of support/counseling.     Will discuss further with mother baby/pediatric SW and psych liaison to find resources to provide family with.    Addendum: Discussed situation with mother baby/pediatric SW and psych liaison. Psych liaison provided CM with resources to provide family with for eventual counseling/therapy in the outpatient setting. Of course, at this point in time the family's focus remains on decision making for the patient, but will need to consider the 15 year old daughter's situation as well. Carmen (eldest daughter) shared yesterday that the 15 year old lives at home with the pt. It is uncertain whether there is a biological father in the picture. If pt does not return home and siblings wish to take dtr in, they would have to work through the court system.     CM/SW will follow up with family when appropriate to further discuss.     OSCAR NorthN, RN-BC    [phone]

## 2024-01-18 NOTE — PROGRESS NOTES
Psych Liaison sent RN Case Manager resources for pt's 15 year old daughter - which included psychotherapy/psychiatry referrals in-network with insurance, support groups and books for adolescents that have parental figures with dx of cancer.

## 2024-01-18 NOTE — DIETARY NOTE
[facility]  NUTRITION ASSESSMENT    Unable to diagnose malnutrition criteria at this time.    NUTRITION INTERVENTION:    RD nutrition Care Plan- See RD nutrition assessment for additional recommendations  Meal and Snacks - ADAT once pt more awake/alert; Monitor and encourage adequate PO intake.  Medical Food Supplements - Will evaluate need when diet is advanced. Rationale/use for oral supplements discussed.  Enteral Nutrition - Once DHT replaced and confirmed in correct position, continue TF at GOAL: Jevity 1.5 at 55 ml/hr x 22hrs.  Goal TF to provide 1815 kcal, 77 gm protein, 920 ml free water.   Recommend  ml q4hrs; TF + FWF = 2120 ml total free water.   Hold TF 1 hour before and after administering flomax.  Coordination of Nutrition Care - SLP consult prior to diet advancement and Palliative care consult for GOC discussion.    PATIENT STATUS: 1/18: Pt transferred to ICU 1/15 d/t increased WOB after MRI - pt was lethargic (likely r/t anesthesia). Pt with minimal nutrition during hospital stay so DHT was placed and TF initiated, however, pt removed DHT yesterday. SLP following but pt unable to participate yesterday - remains NPO. Palliative care involved for GOC discussion. Per rounds, plan to replace DHT if consistent with GOC.    1/15:  Attempted to speak with pt. Pt with Care team at time of visit. Plan for sedated brain MRI today. Pt with minimal to no PO intake x7 days, may consider nutrition support if unable to advance diet within next 24-36hr. No new weights to review, will request updated weight as able.   1/10: 52yo female admit d/t cough, progressive fatigue/CAREY and N/V/D. CT displayed innumerable metastatic lesions in liver and lungs, along with omental mass in RLQ and left ovarian mass. Pt underwent liver biopsy in IR 1/9/24. Heme/Onc consulted, recommending pt stay for 1st cycle chemotherapy inpatient. Pt anemic, received PRBC this morning. Attempted to speak with pt at bedside, pt  unavailable to obtain history. Will attempt to speak with pt at a later date.     PMH: Anemia    ANTHROPOMETRICS:  Ht: 157.5 cm (5' 2\")  Wt: 83.9 kg (184 lb 15.5 oz).   BMI: Body mass index is 33.83 kg/m².  IBW: 50 kg      WEIGHT HISTORY:   Weight loss: JAVAN    Wt Readings from Last 10 Encounters:   01/18/24 83.9 kg (184 lb 15.5 oz)   01/12/24 85.2 kg (187 lb 12.8 oz)        NUTRITION:  Diet:       Procedures    NPO      Food Allergies: No  Cultural/Ethnic/Voodoo Preferences Addressed: Yes    Percent Meals Eaten (last 3 days)       Date/Time Percent Meals Eaten (%)    01/15/24 1019 0 %     Percent Meals Eaten (%): npo at 01/15/24 1019            GI system review: diarrhea; Last BM: 1/18  Skin and wounds: WNL    NUTRITION RELATED PHYSICAL FINDINGS:     1. Body Fat/Muscle Mass:  JAVAN, pt unavailable for visit, will obtain at follow up     2. Fluid Accumulation: lower extremity edema and b/l feet      NUTRITION PRESCRIPTION: 50 kg - IBW  Calories: 7547-5802 calories/day (35-40 kcal/kg)  Protein:  grams protein/day (1.5-2.0 grams protein per kg)  Fluid: ~1 ml/kcal or per MD discretion    NUTRITION DIAGNOSIS/PROBLEM:  Inadequate oral intake related to inability to take or tolerate as evidenced by need for NPO status.    MONITOR AND EVALUATE/NUTRITION GOALS:  PO intake of 75% of meals TID - Not met, discontinue  Weight stable within 1 to 2 lbs during admission - Ongoing  Return to PO intake or advance diet in 24-48 hrs - Resolved  Start alternative nutrition in 24-48 hrs if diet is not able to advance- Resolved  Tolerate alternative nutrition at 100% of goal - New      MEDICATIONS:  Lactulose, multivitamin, abx, flomax  Gtt: precedex, D5W at 50 ml/hr, dilaudid    LABS:  , Na 145, Ammonia 61    Pt is at High nutrition risk    Marzena Munguia RD, LDN, [facility]  Clinical Dietitian  Spectra: [phone]

## 2024-01-18 NOTE — PROGRESS NOTES
Alysia Campos Patient Status:  Inpatient    7/15/1970 MRN [id number]   Location [facility] 4SW-A Attending Maikel Valera DO   Hosp Day # 10 PCP Renetta Santos DO     Critical Care Progress Note          Subjective:  On 15 L HFNC. Somnolent. Precedex gtt and morphine gtt. GOC discussions ongoing with palliative care and family this morning. Code status changed to DNAR select.    Objective:    Medications:   acetaminophen  1,000 mg Intravenous Q8H    piperacillin-tazobactam  3.375 g Intravenous Q8H    enoxaparin  40 mg Subcutaneous Q24H    tamsulosin  0.4 mg Oral Nightly    multivitamin  1 tablet Oral Daily       FiO2 (%):  [93 %] 93 % ,  5 L NC      Intake/Output Summary (Last 24 hours) at 2024 1246  Last data filed at 2024 1200  Gross per 24 hour   Intake 1540.4 ml   Output 1355 ml   Net 185.4 ml       /65   Pulse 115   Temp 99.4 °F (37.4 °C) (Temporal)   Resp (!) 29   Ht 157.5 cm (5' 2\")   Wt 184 lb 15.5 oz (83.9 kg)   LMP  (LMP Unknown)   SpO2 91%   BMI 33.83 kg/m²     Physical Exam:  General Appearance: Somnolent, does not open eyes or follow commands  Neck: No JVD, neck supple, no adenopathy, trachea midline, no carotid bruits  Lungs: Clear to auscultation bilaterally, respirations unlabored  Heart: Regular rate and rhythm, S1 and S2 normal, no murmur, rub or gallop  Abdomen: Distended, tender to palpation. Hypoactive bowel sounds.  Extremities: Lower Extremities significantly edematous 3+ pitting.   Pulses: 2+ and symmetric all extremities  Skin: Skin color, texture, turgor normal for ethnicity, no rashes or lesions, warm and dry    Recent Labs   Lab 24  0411   RBC 3.32*   HGB 7.4*   HCT 25.5*   MCV 76.8*   MCH 22.3*   MCHC 29.0*   RDW 24.9   NEPRELIM 23.68*   WBC 30.8*   .0     Recent Labs   Lab 01/15/24  2027 24  0503 24  0931 24  1946 24  0524 24  0411   GLU 96 96   < > 107* 131* 122*   BUN 25* 26*   < > 34* 33* 37*    CREATSERUM 0.65 0.71   < > 0.87 0.97 0.95   CA 10.5* 9.9   < > 10.3* 9.8 10.0   ALB 3.0*  3.0* 2.9*  --   --   --   --     140   < > 146* 146* 145   K 4.1 3.8   < > 3.6 3.4* 4.0  4.0    109   < > 114* 113* 114*   CO2 19.0* 25.0   < > 28.0 27.0 27.0   ALKPHO 211*  211* 193*  --   --   --   --    *  120* 117*  --   --   --   --    ALT 48  48 46  --   --   --   --    BILT 1.0  1.0 1.0  --   --   --   --    TP 6.9  6.9 6.6  --   --   --   --     < > = values in this interval not displayed.     Recent Labs   Lab 01/17/24  0856   ABGPHT 7.38   MHMBMI4C 45   NYXES8Y 78*   ABGHCO3 26.0   ABGBE 1.3   TEMP 98.6   MOE Positive   SITE Right Radial   DEV Bi-PAP   THGB 8.5*     No results for input(s): \"BNP\" in the last 168 hours.  No results for input(s): \"TROP\", \"CK\" in the last 168 hours.    No results found.       Assessment/Plan:  Acute hypoxic respiratory failure: Possibly related to lymphangitic spread, pulmonary masses. Initially thought pulmonary edema-now less likely   - Currently on 15 L HFNC. O2 needs increasing  - Chest xray with bilateral pulmonary masses with small right and trace left pleural effusions. Does not appear to be pulm edema  - CTA chest negative for PE  - RVP negative  - Wean o2 as able    Acute encephalopathy: Unclear etiology. May be multifactorial. Delirium may be contributing given hallucinations and lack of sleep, narcotics. May need to rule out further CNS involvement  - Continuous pain medication may be a contributing factor  - MRI brain negative for acute process or mets  - Ammonia elevated, continue lactulose. May be contributing, but not likely to be the only factor  - ABG without hypercapnea  - Meds reviewed by pharmacy. Holding seroquel and buspar dicontinued  - Requiring precedex for anxiety and Xanax PRN  - Re-orient frequently and PRN  - Psych on consult    Ovarian mass with hepatic and pulmonary metastases  - New diagnosis this admit, CT abd with multiple  pulmonary, hepatic metastases, omental mass, 7.8cm L ovarian mass.  - S/p ultrasound guiding biopsy of liver 1/9/23, pathology with prelim results malignant  - MRI brain without metastases   - Gyn/Oncology following-suspect GYN source but awaiting path results.  - pain control    Abdominal pain/distention, transaminitis: In setting of hepatic metastases  - LFTs elevated  - Abd ultrasound 1/14 with trace ascites, KUB without acute process.  - CT abdomen with hepatomegaly  - Morphine PCA, norco PRN  - Bowel regimen  - Daily CMP    Significant LE edema  - Pro BNP elevated   - Net (+) since admit  -s/p lasix on 1/15. Holding on further diuresis as CXR does not appear to be FVO  - TTE with hyperdynamic EF (70-75%), IVC dilated    Transaminitis: In setting of hepatic metastases  -LFTs elevated  -Daily CMP      Urinary retention  - Flomax  - Urology following, hunter in place    ID: Leukocytosis  - WBC has consistently been elevated >29401 this admission- appears stable  - Afebrile, monitor fever curve  - Pct 0.65  - Lactic acid 2.2, now normalized  - Blood cultures NGTD  - MRSA negative  - UA negative  - Zosyn (1/15- )      Tachycardia: Likely multifactorial due to ongoing pain, hypoxia  - EKG showed afib (No history and currently in NSR/ST). Hold on a/c  - Received low dose BB x1 overnight  - monitor on tele  - pain mgt as above    Microcytic Anemia 2/2 to iron deficiency  - EGD/colonoscopy 1/11/23 without active bleed  - No evidence of active bleeding  - Baseline hgb 7.5-8.0.  - S/p 1u prbcs 1/10/23, 1u prbcs 1/12/23  - s/p IV venofer (1/9-1/11)  - Transfuse to maintain HGB >7.  - Daily CBC  -Resume lovenox SQ    F/E/N:    - Repelte electrolyte per protocl  - Pulled out dobhoff. Hold off on replacement until GOC determined    Proph:  - SQ Lovenox  - SCDs  - PT/OT    Dispo:   - DNAR select  - Palliative care following-GOC discussions on-going with Dr. Ngo  - ICU monitoring    Plan of care discussed with intensivist,   Lorena Vick, DEWAYNE-BC  ICU  Phone  [phone]   Pager [phone]    Pulm/Crit Care attending addendum:  - pt seen and examined with ICU APN.  Agree with A/P as listed above.  - pt continues to wax and wane in regards to MS.  Has significant periods of delirium along with pain; responds favorably to opioids.  Continues to require high FIO2 needs; weaning precedex with hope of allowing family to communicate more meaningfully with pt.  Final path still pending but suspect widely metastatic ovarian CA; onc following.  - will cont with ICU care for increased nursing needs and resp failure.  Poor overall prognosis.  Hospice appropriate.  - critical care time; 35 minutes    Freeman Carrillo MD

## 2024-01-19 NOTE — CM/SW NOTE
CM met with patient's son Chano at bedside today; Chano expressed family's mixed feelings of shock and sadness regarding patient's illness.  Chano stated his younger sister Wendy will live with him and his family (spouse and 2 sons) in [place] per patient's wishes.  Chano is also aware he will need to pursue Guardianship of Wendy, but has not had time to contact an  for guidance.  CM provided Chano with an  contact for follow up and encouraged Chano to call CM for additional needs and support.        Taryn Pitts RN Case Manager [phone]

## 2024-01-19 NOTE — CM/SW NOTE
SAULO met at bedside with son Chano and explained hospice services.  Support provided.  Son would like his sister and grandmother's blessing to place the patient on hospice prior to signing consents.  He will call hospice once family arrives and we will meet again.    Isa Leblanc LCSW  [facility]  [phone]

## 2024-01-19 NOTE — PLAN OF CARE
Resumed care at 0730. On vapotherm. Very large incontinent episode this afternoon, hunter not in place. Removed hunter, attempted to place new one x1. Per family, wanted to attempt using purewick before replacing hunter. Transitioned to comfort care this evening, weaning off vapotherm. On morphine drip.

## 2024-01-19 NOTE — PLAN OF CARE
Received patient resting in the bed, on vapotherm 100%, 40L, patient moans, groans, repositioned for comfort, morphine 2 mg iv bolus given in addition to morphine drip at 3mg/hr.  Family at bedside, plan of care discussed, support provided.     Problem: PAIN - ADULT  Goal: Verbalizes/displays adequate comfort level or patient's stated pain goal  Description: INTERVENTIONS:  - Encourage pt to monitor pain and request assistance  - Assess pain using appropriate pain scale  - Administer analgesics based on type and severity of pain and evaluate response  - Implement non-pharmacological measures as appropriate and evaluate response  - Consider cultural and social influences on pain and pain management  - Manage/alleviate anxiety  - Utilize distraction and/or relaxation techniques  - Monitor for opioid side effects  - Notify MD/LIP if interventions unsuccessful or patient reports new pain  - Anticipate increased pain with activity and pre-medicate as appropriate  Outcome: Progressing     Problem: RESPIRATORY - ADULT  Goal: Achieves optimal ventilation and oxygenation  Description: INTERVENTIONS:  - Assess for changes in respiratory status  - Assess for changes in mentation and behavior  - Position to facilitate oxygenation and minimize respiratory effort  - Oxygen supplementation based on oxygen saturation or ABGs  - Provide Smoking Cessation handout, if applicable  - Encourage broncho-pulmonary hygiene including cough, deep breathe, Incentive Spirometry  - Assess the need for suctioning and perform as needed  - Assess and instruct to report SOB or any respiratory difficulty  - Respiratory Therapy support as indicated  - Manage/alleviate anxiety  - Monitor for signs/symptoms of CO2 retention  Outcome: Progressing     Problem: HEMATOLOGIC - ADULT  Goal: Maintains hematologic stability  Description: INTERVENTIONS  - Assess for signs and symptoms of bleeding or hemorrhage  - Monitor labs and vital signs for trends  -  Administer supportive blood products/factors, fluids and medications as ordered and appropriate  - Administer supportive blood products/factors as ordered and appropriate  Outcome: Progressing     Problem: GASTROINTESTINAL - ADULT  Goal: Minimal or absence of nausea and vomiting  Description: INTERVENTIONS:  - Maintain adequate hydration with IV or PO as ordered and tolerated  - Nasogastric tube to low intermittent suction as ordered  - Evaluate effectiveness of ordered antiemetic medications  - Provide nonpharmacologic comfort measures as appropriate  - Advance diet as tolerated, if ordered  - Obtain nutritional consult as needed  - Evaluate fluid balance  Outcome: Progressing

## 2024-01-19 NOTE — CM/SW NOTE
Case discussed with Marysol Rodriguez. POC relayed to CM following pt.     Pavel Mcghee, MSN RN, CM

## 2024-01-19 NOTE — PROGRESS NOTES
Hematology/Oncology Progress Note    Patient Name: Alysia Campos  Medical Record Number: [id number]    YOB: 1970     Reason for Consultation:  Alysia Campos was seen today for the diagnosis of metastatic malignancy    Interval events: Patient remains somnolent on morphine drip.  When she woke up last night was confused and uncomfortable.  Based on my discussion with Chano today, the family is moving towards hospice but wants all the family members to accept this before full transition is made.  Patient requiring more oxygen today.    Current Inpatient Medications:  Inpatient Meds:   acetaminophen  1,000 mg Intravenous Q8H    piperacillin-tazobactam  3.375 g Intravenous Q8H    enoxaparin  40 mg Subcutaneous Q24H    tamsulosin  0.4 mg Oral Nightly    multivitamin  1 tablet Oral Daily      morphINE in sodium chloride 0.9% 3 mg/hr (01/18/24 1500)    dextrose 50 mL/hr at 01/19/24 0414    dexmedetomidine Stopped (01/18/24 1712)    dextrose 10%      norepinephrine Stopped (01/18/24 1126)    sodium chloride 10 mL/hr at 01/14/24 1708     PRN Meds:  morphINE, sennosides, haloperidol lactate, LORazepam, dextrose 10%, lipase-protease-amylase (Lip-Prot-Amyl) **AND** sodium bicarbonate, naloxone, ipratropium-albuterol, dextromethorphan-guaiFENesin, ondansetron, prochlorperazine    Allergies:   Allergies   Allergen Reactions    Mold TINITUS       Vital Signs:  Height: --  Weight: --  BSA (Calculated - sq m): --  Pulse: 113 (01/19 1200)  BP: 104/56 (01/19 1200)  Temp: 98.2 °F (36.8 °C) (01/19 1200)  Do Not Use - Resp Rate: --  SpO2: 93 % (01/19 1200)    Wt Readings from Last 6 Encounters:   01/18/24 83.9 kg (184 lb 15.5 oz)   01/12/24 85.2 kg (187 lb 12.8 oz)     ECOG PS: 4    Physical Examination:  General: Patient is asleep  Psych: Unable to assess   Eyes: Closed  Skin: no rashes or petechiae    Laboratory:  Recent Labs   Lab 01/17/24  0524 01/18/24  0411 01/19/24  0523   WBC 27.4* 30.8* 50.5*   HGB 7.5* 7.4* 8.0*    HCT 26.0* 25.5* 29.2*   .0 285.0 352.0   MCV 77.2* 76.8* 81.8   RDW 24.7 24.9 25.3   NEPRELIM 21.90* 23.68* 36.22*     Recent Labs   Lab 01/15/24  2027 01/16/24  0503 01/16/24  0931 01/17/24  0524 01/18/24  0411 01/19/24  0523    140   < > 146* 145 144   K 4.1 3.8   < > 3.4* 4.0  4.0 4.9    109   < > 113* 114* 112   CO2 19.0* 25.0   < > 27.0 27.0 28.0   BUN 25* 26*   < > 33* 37* 38*   CREATSERUM 0.65 0.71   < > 0.97 0.95 1.18*   GLU 96 96   < > 131* 122* 145*   CA 10.5* 9.9   < > 9.8 10.0 10.2*   PHOS 1.9* 3.2  --   --   --   --    TP 6.9  6.9 6.6  --   --   --   --    ALB 3.0*  3.0* 2.9*  --   --   --   --    ALKPHO 211*  211* 193*  --   --   --   --    *  120* 117*  --   --   --   --    ALT 48  48 46  --   --   --   --    BILT 1.0  1.0 1.0  --   --   --   --     < > = values in this interval not displayed.     No results for input(s): \"PT\", \"INR\", \"PTT\", \"FIB\" in the last 168 hours.    Lab Results   Component Value Date    .3 (H) 01/08/2024     Lab Results   Component Value Date     150.3 (H) 01/08/2024     Lab Results   Component Value Date    LDH 1,331 (H) 01/11/2024    LDH 1,190 (H) 01/10/2024     Lab Results   Component Value Date     61.7 (H) 01/08/2024     Imaging:    CT a/p 1/8/24: CONCLUSION:    1. Findings concerning for metastatic disease.  There are innumerable nodules within the visualized lungs as well as innumerable metastatic lesions throughout the liver.  Additionally, there is an omental base mass within the right lower quadrant and a   large mass which appears to be emanating from the left ovary measuring 7.8 cm.   2. There is no discrete evidence of an acute intra-abdominal or pelvic process.     CTA chest 1/10/24: CONCLUSION:     1. Negative for pulmonary embolism.    2. 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  Larger areas of consolidation involving the lower lobes and lingula may reflect  additional sites of metastatic disease versus superimposed pneumonia.  The right lower lobe consolidation is increased in size compared to 01/08/2024.    3. Findings of extensive malignant lymphadenopathy of the thorax.    4. Widespread hepatic metastatic disease.        MRI brain 1/15/24: No abnormal enhancement to suggest metastases.     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  I have discussed diagnosis, prognosis, and treatment recommendations at length with patient's family at bedside.  We discussed that given severe clinical deterioration and present performance status, she is not a candidate to receive chemotherapy.  I advised that giving chemotherapy in this setting would likely pose more harm than benefit.  I recommended transition to comfort care only/hospice.  Family has been working with the palliative care and seems to be gradually moving in the direction of hospice.  Palliative care will continue to follow with family.     Will sign off at this time.  I encouraged family to reach out to me with any questions if they arise.     Maikel Zaldivar MD  Hematology/Medical Oncology  [facility]

## 2024-01-19 NOTE — PROGRESS NOTES
[facility]  Palliative Care Progress Note    Alysia Campos Patient Status:  Inpatient    7/15/1970 MRN [id number]   [facility] 4NW-A Attending Harshal Aponte MD   Hosp Day # 11 PCP Renetta Santos DO     History/Other:       Alysia Campos is a 53 year old female with migraines who was admitted on 2024 for cough, N/V/D. Work up in our hospital revealed CT scan with lung and liver nodules, an omental mass, and a left ovarian 8 cm mass and labs significant for iron deficiency anemia (Hgb 7.0), leukocytosis, elevated AST/Alk phos, hyponatremia (Na 124), and elevated tumor markers //CEA. Liver bx on  with documented prelim results per oncology that is suggestive of malignancy. Anemia s/p 2 units pRBCs and venofer x3 days.      Consult ordered by:: Dr. Mckeon for evaluation of Palliative Care needs and Uncontrolled symptoms.    Allergies:  Allergies   Allergen Reactions    Mold TINITUS     Medications:     Current Facility-Administered Medications:     acetaminophen (Ofirmev) 10 mg/mL infusion premix 1,000 mg, 1,000 mg, Intravenous, Q8H    morphINE 2 mg BOLUS FROM PCA, 2 mg, Intravenous, Q30 Min PRN    morphINE in sodium chloride 0.9% 100 mg/100mL PCA premix, , Intravenous, Continuous    sennosides (Senokot) tab 8.6 mg, 8.6 mg, Oral, BID PRN    haloperidol lactate (Haldol) 5 MG/ML injection 2 mg, 2 mg, Intravenous, Q4H PRN    LORazepam (Ativan) 2 mg/mL injection 1 mg, 1 mg, Intravenous, Q4H PRN    dextrose 5% infusion, , Intravenous, Continuous    dexmedeTOMIDine (Precedex) 800 mcg in sodium chloride 0.9% 100 mL infusion, 0.2-1.5 mcg/kg/hr (Dosing Weight), Intravenous, Continuous    dextrose 10% infusion (TPN no rate), , Intravenous, Continuous PRN    pancrelipase (Lip-Prot-Amyl) (Zenpep) DR particles cap 10,000 Units, 10,000 Units, Per G Tube, PRN **AND** sodium bicarbonate tab 325 mg, 325 mg, Per G Tube, PRN    norepinephrine (Levophed) 8 mg in dextrose 5% 250 mL infusion, 0.5-30  mcg/min, Intravenous, Continuous    piperacillin-tazobactam (Zosyn) 3.375 g in dextrose 5% 100 mL IVPB-ADDV, 3.375 g, Intravenous, Q8H    enoxaparin (Lovenox) 40 MG/0.4ML SUBQ injection 40 mg, 40 mg, Subcutaneous, Q24H    tamsulosin (Flomax) cap 0.4 mg, 0.4 mg, Oral, Nightly    sodium chloride 0.9% infusion, , Intravenous, Continuous    naloxone (Narcan) 0.4 MG/ML injection 0.08 mg, 0.08 mg, Intravenous, Q5 Min PRN    multivitamin (Centrum) chewable tab (Adult) 1 tablet, 1 tablet, Oral, Daily    ipratropium-albuterol (Duoneb) 0.5-2.5 (3) MG/3ML inhalation solution 3 mL, 3 mL, Nebulization, Q6H PRN    dextromethorphan-guaiFENesin (Robitussin-DM)  mg/5mL oral solution 5 mL, 5 mL, Oral, Q6H PRN    ondansetron (Zofran) 4 MG/2ML injection 4 mg, 4 mg, Intravenous, Q6H PRN    prochlorperazine (Compazine) 10 MG/2ML injection 5 mg, 5 mg, Intravenous, Q8H PRN    Subjective      Interval events:  yesterday, pt was rotated from dilaudid to morphine and precedex weaned down by 1712. Over the past 24 hours, she received ativan x2 and morphine bolus x9 (last 2040 1/18). O2 requirements increased overnight to vapotherm 40L 100%; SpO2 dipped to 70's%. RN reports minimal UOP.     Attempted to see pt at 9:50 AM; pt resting and appearing comfortable and son asleep at bedside. Will revisit shortly.     Returned to the room at 11 AM. When I entered the room, the patient was sleeping and lying in bed. Son and Hospice SW  present at bedside.     Pt somnolent.      See summary of discussion below.     Review of Systems:  Pertinent items are noted in subjective.    Objective:     Vital Signs:  Blood pressure 99/57, pulse 117, temperature 98.8 °F (37.1 °C), temperature source Temporal, resp. rate (!) 27, height 5' 2\" (1.575 m), weight 184 lb 15.5 oz (83.9 kg), SpO2 92%.  Body mass index is 33.83 kg/m².  Current Palliative performance scale PPSv2 (%): 10    Physical Exam:  General: Somnolent.   HEENT: Eyes closed.   Lungs: Shallow  breathing w/ RR in mid 20s. On vapotherm 40LPM 100%   Neurologic: somnolent   Psychiatric: JAVAN  Skin: pale     Results:     Hematology:  Lab Results   Component Value Date    WBC 50.5 (H) 01/19/2024    HGB 8.0 (L) 01/19/2024    HCT 29.2 (L) 01/19/2024    .0 01/19/2024     Coags:  Lab Results   Component Value Date    INR 1.07 01/09/2024    PTT 28.4 01/09/2024     Chemistry:  Lab Results   Component Value Date    CREATSERUM 1.18 (H) 01/19/2024    BUN 38 (H) 01/19/2024     01/19/2024    K 4.9 01/19/2024     01/19/2024    CO2 28.0 01/19/2024     (H) 01/19/2024    CA 10.2 (H) 01/19/2024    ALB 2.9 (L) 01/16/2024    ALKPHO 193 (H) 01/16/2024    BILT 1.0 01/16/2024    TP 6.6 01/16/2024     (H) 01/16/2024    ALT 46 01/16/2024    MG 2.7 (H) 01/15/2024    PHOS 3.2 01/16/2024     Imaging:  No results found.       Summary of Discussion     Lengthy discussion with Chano at bedside.   He wishes to proceed with comfort care, but needs his sisters to be on board as well.   Complex dynamics with youngest daughter; SW/CM looking into resources including child life.    Provided support. Was later joined by pt's mother Pat and ROULA.     Advance Care Planning counseling and discussion:   HC POA Documentation Completed--Document in Epic.   Son Chano.   POLST FORM Not completed  DNAR/Selective Treatment      Assessment and Recommendations       Principal Problem:    Metastatic malignant neoplasm, unspecified site (HCC)  Active Problems:    Hypokalemia    Hyponatremia    Leukocytosis, unspecified type    Anemia, unspecified type    Cancer related pain    Palliative care by specialist    Goals of care, counseling/discussion    Encephalopathy    Acute encephalopathy    Cancer with unknown primary site (HCC)    Goals of care:  ongoing       Pt is GIP appropriate. Awaiting family decision   Family acknowledges terminal prognosis   Code Status: DNAR/Selective Treatment   POA is patt Madden.     Symptoms  #cancer  related pain  - IV dilaudid PCA rotation to morphine PCA yesterday    -continuous 3mg   -pt admin 0mg     -clinician bolus 2mg Q30 minutes PRN    #Delirium  -appreciate psych's recommendations from the other day   -off prcedex  -prn haldol or ativan     At this time, pt appears comfortable at rest. She has multi organ dysfunction and low UOP. Expressed to son that prognosis has changed to hours to days.     Discussed today's visit with hospice RN/SW, ICU RN, and family.     Palliative Care Follow Up: Palliative care team will Continue to follow while inpatient.    Thank you for allowing Palliative Care services to participate in the care of Alysia Campos.    A total of  50  minutes were spent on this consult, which included all of the following: chart review, direct face to face contact, history taking, physical examination, counseling and coordinating care, and documentation.     Jessie Ngo MD  1/19/2024  11:42 AM   Palliative Care Services    The 21st Century Cures Act makes medical notes like these available to patients in the interest of transparency. Please be advised this is a medical document. Medical documents are intended to carry relevant information, facts as evident, and the clinical opinion of the practitioner. The medical note is intended as peer to peer communication and may appear blunt or direct. It is written in medical language and may contain abbreviations or verbiage that are unfamiliar.      Addendum: family grabbed me for an SpO2 of 70% while I was sitting in the ICU. Discussed with family that pt is not getting sufficient oxygen with vapotherm and decisions regarding escalating care (bipap) vs comfort care need to be addressed. Provided support. PE: lungs diminished, rhonchi, tachypnea w/ RR in 30s. Chano wanted to have both of his sisters present to make a decision, so no action taken for several minutes. After RN readjusted cannula pt's SpO2 increased to 85-87%, but tachypnea and swallow  breathing remained. Family asking for a few minutes to discuss privately.   An additional 20 minutes spent on encounter.   Jessie Ngo MD, 24, 2:06 PM    Addendum: spoke with family shortly after the above. Grieving and having a difficult time making transition. Family accepting of comfort care by PC MD and ICU team. SW from hospice will remain available for bereavement support and  resource assistance. Comfort orders placed. Will continue Morphine gtt at 3mg/H and RN to titrate to need with bolus from bag of 2mg available PRN. Instructed RN that family is requesting large monitor off, but small in room monitor to stay on. Vapotherm to continue and wean when family is ready and pt is comfortable. They have friends and family coming to visit today, so consider weaning later tonight or tomorrow. Family's questions answered. ICU APN Masha aware of plan and accepting of managing comfort care.   An additional 40 minutes spent with family and coordinating care.   Total time spent today was 110 minutes.   Jessie Ngo MD, 24, 3:02 PM

## 2024-01-19 NOTE — PROGRESS NOTES
Alysia HAWK Beth Patient Status:  Inpatient    7/15/1970 MRN [id number]   [facility] 4SW-A Attending Maikel Valera DO   Hosp Day # 11 PCP Renetta Santos DO     Critical Care Progress Note          Subjective:  Patient asleep, continues vapotherm at 40/100.    Objective:    Medications:   acetaminophen  1,000 mg Intravenous Q8H    piperacillin-tazobactam  3.375 g Intravenous Q8H    enoxaparin  40 mg Subcutaneous Q24H    tamsulosin  0.4 mg Oral Nightly    multivitamin  1 tablet Oral Daily         Intake/Output Summary (Last 24 hours) at 2024 0807  Last data filed at 2024 0751  Gross per 24 hour   Intake 2421.95 ml   Output 170 ml   Net 2251.95 ml       /48   Pulse 116   Temp 98.8 °F (37.1 °C) (Temporal)   Resp (!) 37   Ht 157.5 cm (5' 2\")   Wt 184 lb 15.5 oz (83.9 kg)   LMP  (LMP Unknown)   SpO2 90%   BMI 33.83 kg/m²     Physical Exam:  General Appearance: Lethargic, conversational dyspnea, moaning in pain with attempting to urinate  Neck: No JVD, neck supple, no adenopathy, trachea midline, no carotid bruits  Lungs: Course to auscultation bilaterally, respirations unlabored  Heart: Regular rate and rhythm, S1 and S2 normal, no murmur, rub or gallop  Abdomen: Distended, tender to palpation. Hypoactive bowel sounds.  Extremities: Lower Extremities significantly edematous 3+ pitting.   Pulses: 2+ and symmetric all extremities  Skin: Skin color, texture, turgor normal for ethnicity, no rashes or lesions, warm and dry    Recent Labs   Lab 24  0523   RBC 3.57*   HGB 8.0*   HCT 29.2*   MCV 81.8   MCH 22.4*   MCHC 27.4*   RDW 25.3   NEPRELIM 36.22*   WBC 50.5*   .0     Recent Labs   Lab 01/15/24  2027 24  0503 24  0931 24  0524 24  0411 24  0523   GLU 96 96   < > 131* 122* 145*   BUN 25* 26*   < > 33* 37* 38*   CREATSERUM 0.65 0.71   < > 0.97 0.95 1.18*   CA 10.5* 9.9   < > 9.8 10.0 10.2*   ALB 3.0*  3.0* 2.9*  --   --   --   --    NA  141 140   < > 146* 145 144   K 4.1 3.8   < > 3.4* 4.0  4.0 4.9    109   < > 113* 114* 112   CO2 19.0* 25.0   < > 27.0 27.0 28.0   ALKPHO 211*  211* 193*  --   --   --   --    *  120* 117*  --   --   --   --    ALT 48  48 46  --   --   --   --    BILT 1.0  1.0 1.0  --   --   --   --    TP 6.9  6.9 6.6  --   --   --   --     < > = values in this interval not displayed.     Recent Labs   Lab 01/17/24  0856   ABGPHT 7.38   THMMTQ9X 45   JJATH1G 78*   ABGHCO3 26.0   ABGBE 1.3   TEMP 98.6   MOE Positive   SITE Right Radial   DEV Bi-PAP   THGB 8.5*     No results for input(s): \"BNP\" in the last 168 hours.  No results for input(s): \"TROP\", \"CK\" in the last 168 hours.    No results found.       Assessment/Plan:  Acute hypoxic respiratory failure: Possibly related to lymphangitic spread, pulmonary masses. Initially thought pulmonary edema-now less likely   - Increased O2 needs post anesthesia for MRI on 1/16  - Weaned from AVAPS this morning. Currently requiring vapotherm 40/100, wean further as tolerated.   - Chest xray with bilateral pulmonary masses with small right and trace left pleural effusions.Does not appear to be pulm edema  - CTA chest negative for PE  - RVP negative  - Wean o2 as able    Acute encephalopathy: Unclear etiology. May be multifactorial. Delirium may be contributing given hallucinations and lack of sleep, narcotics. May need to rule out further CNS involvement  - Continuous dilaudid PCA could also be a contributing factor  - MRI brain negative for acute process or mets  - Ammonia elevated, continue lactulose. May be contributing, but not likely to be the only factor  - ABG without hypercapnea  - Meds reviewed by pharmacy. 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  - S/p ultrasound guiding biopsy of liver 1/9/23, pathology with prelim results malignant  - MRI brain without metastases   - Gyn/Oncology following-suspect GYN source but awaiting path results.   - pain control    Abdominal pain/distention, transaminitis: In setting of hepatic metastases  - LFTs elevated  - Abd ultrasound 1/14 with trace ascites, KUB without acute process.  - CT abdomen with hepatomegaly  - Dilaudid PCA, norco PRN  - Bowel regimen  - Daily CMP    Significant LE edema  - Pro BNP elevated   - Net (+) since admit  -s/p lasix on 1/15. Holding on further diuresis as CXR does not appear to be FVO  - TTE with hyperdynamic EF (70-75%), IVC dilated  - Elevate LEs as able    Transaminitis: In setting of hepatic metastases  -LFTs elevated  -Daily CMP    Urinary retention  - Flomax  - Urology following, hunter in plae    ID: Leukocytosis  - WBC has consistently been elevated >46705 this admission- appears stable,worse today  - Afebrile, monitor fever curve  - Pct 0.65  - Lactic acid 2.2, now normalized  - Blood cultures NGTD  - MRSA negative  - UA negative  - Zosyn (1/15- )  - Consider ID consult if counts worsen     Tachycardia: Likely multifactorial due to ongoing pain, hypoxia  - EKG showed afib (No history and currently in NSR/ST). Hold on a/c  - Received low dose BB x1 overnight  - monitor on tele  - pain mgt as above    Microcytic Anemia 2/2 to iron deficiency  - EGD/colonoscopy 1/11/23 without active bleed  - No evidence of active bleeding  - Baseline hgb 7.5-8.0.  - S/p 1u prbcs 1/10/23, 1u prbcs 1/12/23  - s/p IV venofer (1/9-1/11)  - Transfuse to maintain HGB >7.  - Daily CBC    F/E/N:    - Repelte electrolyte per protocl  - dobhoff-TFs (had poor nutrition with po intake)    Proph:  - SQ Lovenox  - SCDs  - PT/OT    Dispo:   - Full code  - Palliative care following, meeting with hospice today  - ICU monitoring. Son updated at bedside    Plan of care  discussed with intensivist, Dr. Ramsey.    Masha Biswas, [facility]  Critical Care NP  Phone [phone]      Intensivist addendum:   I agree with APN note above. I have independently seen and evaluated the patient. I agree with the management plan outlined above. Hospice to meet with family today.    Prince Ramsey MD  Pulmonary and Critical Care Medicine

## 2024-01-19 NOTE — PROGRESS NOTES
[facility]    Progress Note     Alysia Campos Patient Status:  Inpatient    7/15/1970 MRN [id number]   Location [facility] 4NW-A Attending Harshal Aponte MD   Hosp Day # 11 PCP Renetta Santos DO     Follow up for: The primary encounter diagnosis was Metastatic malignant neoplasm, unspecified site (HCC). Diagnoses of Hyponatremia, Leukocytosis, unspecified type, and Anemia, unspecified type were also pertinent to this visit.      Interval History/Subjective:     Pt seen and examined.  She is now on a morphine drip, on vapotherm 100% 40 L.  Eyes closed, does not appear in distress.  No fevers.       Vital signs:  Temp:  [97.7 °F (36.5 °C)-100.4 °F (38 °C)] 98.8 °F (37.1 °C)  Pulse:  [] 113  Resp:  [17-46] 25  BP: ()/() 100/56  SpO2:  [85 %-95 %] 92 %  FiO2 (%):  [100 %] 100 %    Physical Exam:      General: Eyes closed on vapotherm, not in distress.    HEENT: Normocephalic atraumatic. Moist mucous membranes; Sclera anicteric. EOMI. NGT in place.  Neck: Supple, +JVD  Respiratory: Diminished at bilateral bases, otherwise CTA; ;reg resp rate & effort, no wheezes/crackles   Cardiovascular: S1, S2. Tachycardic. No murmurs appreciable   Chest and Back: No tenderness or deformity.  Abdomen: distended with generalized TTP; no guarding/rebound tenderness; +hepatomegaly  Neurologic: Drowsy but arousable to voice; No focal neurological deficits. CNII-XII grossly intact.  Musculoskeletal: Moves all extremities.  Extremities: +LE edema   Integument: No rashes or lesions.   Psychiatric: Appropriate mood and affect.       Diagnostic Data:      Labs:  Recent Labs   Lab 01/15/24  0704 01/15/24  2027 24  0503 24  0524 24  0411 24  0523   WBC 29.6* 34.2* 32.2* 27.4* 30.8* 50.5*   HGB 7.6* 8.5* 7.7* 7.5* 7.4* 8.0*   MCV 75.9* 74.9* 74.7* 77.2* 76.8* 81.8   .0 371.0 304.0 313.0 285.0 352.0   BAND 8 1 5 1  --  4       Recent Labs   Lab 01/15/24  2027 01/16/24  3507  01/16/24  0931 01/17/24  0524 01/18/24  0411 01/19/24  0523   GLU 96 96   < > 131* 122* 145*   BUN 25* 26*   < > 33* 37* 38*   CREATSERUM 0.65 0.71   < > 0.97 0.95 1.18*   CA 10.5* 9.9   < > 9.8 10.0 10.2*   ALB 3.0*  3.0* 2.9*  --   --   --   --     140   < > 146* 145 144   K 4.1 3.8   < > 3.4* 4.0  4.0 4.9    109   < > 113* 114* 112   CO2 19.0* 25.0   < > 27.0 27.0 28.0   ALKPHO 211*  211* 193*  --   --   --   --    *  120* 117*  --   --   --   --    ALT 48  48 46  --   --   --   --    BILT 1.0  1.0 1.0  --   --   --   --    TP 6.9  6.9 6.6  --   --   --   --     < > = values in this interval not displayed.       No results for input(s): \"PTP\", \"INR\" in the last 168 hours.      No results for input(s): \"TROP\", \"CK\" in the last 168 hours.         Imaging: Imaging data reviewed in Epic.    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   Hyponatremia  Acute on Chronic Anemia      Plan    Acute hypoxic respiratory failure   - suspect multifactorial (anesthesia, pulm masses, fluid overload, narcotics)   - CTA chest neg for PE  - s/p IV lasix x 1 on 1/15  - RVP neg  - wean O2 as tolerated     Acute encephalopathy with agitation   - suspect due to anesthesia and narcotics  - ABG with pH 7.30, PCO2 49, PO2  102  - ammonia 55  -  alk phose 193  - MRI brain neg for acute pathology   - consider narcan given high dose opiates on PCA  - stop nightly seroquel   - start ativan, wean precedex gtt  - hold off on  neuro c/s  for now  - monitor for improvement     Diplopia  -Unclear if secondary to sedation given increased pain requirements  -Given evidence of widely metastatic disease will need to check MRI brain to rule out metastases, pending  -- MRI brain neg for mets.     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   -- CT Abd w/ multiple pulmonary, hepatic metastases, omental mass, 7.8cm left ovarian mass  -- CT Chest performed 1/8 w/ innumerable pulmonary nodules  w/ large confluent masses at bilateral bases  -- Gyn Onc consulted, plan discussed  -Oncology consult, recommendations reviewed, pending results of biopsy  - US pelvis unremarkable   -Status post US guided biopsy of liver 1/9/2024, pathology pending  -Continue Norco, Dilaudid as needed for pain  -1/10: Based on patient's tenuous clinical status, may require first round of chemotherapy as inpatient per oncology, pending initial pathology results  - MRI brain neg for mets  - chemo unlikely to be of benefit, comfort care/hospice recommended     Hyponatremia-resolved   Possible SIADH   -- likely hypovolemic in the setting of poor PO intake, nausea/vomiting  -- Na 126 on admission, s/p 1L NS bolus  - Sodium correcting appropriately, change BMP to BID  --resolved s/p tolvaptan x1 1/12, change mIVF to TKO      Acute on Chronic Anemia  -- stable  -- recent baseline Hgb 7.7-8.0, 7.0 on admission  -- no e/o active bleeding or hemodynamic instability  -- 1 unit PRBC given 1/9 with minimal response, additional unit ordered today  -- Iron studies consistent with deficiency, Venofer ordered  - CTM, transfuse to maintain goal hemoglobin  >7.0  - GI consulted due to persistent anemia, elevated CEA levels  -- EGD/Colonoscopy w/o active bleeding or clear e/o malignancy, biopsies taken, path pending; no further interventions per GI   -- 1/14: Hgb now stable >48 hours, resume lovenox for dvt prophylaxis     LE edema  - pt net + 7.8 L since admission   - stop IVFs, PRN diuretics   - BNP (1934), TTE with LVEF wnl    Leukocytosis   - significant but stable  - procal 0.65  - lactate normalized   - cont empiric zosyn (1/15- )  - f/u Bcx's  - UA neg    DVT Mechanical Prophylaxis:   SCDs,    DVT Pharmacologic Prophylaxis   Medication    enoxaparin (Lovenox) 40 MG/0.4ML SUBQ injection 40 mg              Code Status: FULL     Dispo: inpatient care in the ICU; EVA >2 midnights    Family discussion ongoing.   Pt now DNAR/select.   Await formal hospice eval with meeting this AM.     Maikel Soto [facility]   [phone]      This note was created using voice recognition technology. It may include inadvertent transcriptional errors. Any such errors should be contextually interpreted and should not be taken to alter the content or the meaning.     Note to Patient: The 21st Century Cures Act makes medical notes like these available to patients in the interest of transparency. However, be advised this is a medical document. It is intended as peer to peer communication. It is written in medical language and may contain abbreviations or verbiage that are unfamiliar. It may appear blunt or direct. Medical documents are intended to carry relevant information, facts as evident, and the clinical opinion of the practitioner and not intended to be the primary source of your information.  Please refer directly to myself or clinical staff for information regarding plan of care.

## 2024-01-20 NOTE — PAT NURSING NOTE
Upon assessment Pt  @ 194. Family at bedside.  Dr. Zaldivar, Felicia, Oanh Kiran Yunus, and Selene notified.

## 2024-01-20 NOTE — DISCHARGE SUMMARY
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  For all other patients, please follow usual protocol for discharge care transition.    Discharge Condition:      Disposition:       Important Follow up:  - PCP within N/A  - Consults: Pulm, hem/onc, psych, palliative care    Follow Up Items:  N/A      Hospital Course:      53 year old female who presents with symptomatic acute on chronic anemia, hyponatremia and multiple intraabdominal masses concerning for metastic disease.  Hem/onc consulted, pt underwent liver bx.  She then underwent MRI brain under anesthesia which was neg for mets.  After the MRI she developed acute respiratory failure, was transferred to ICU.  Despite IV diuretics, IV abx she continued to worsen clinically, developed worsening encephalopathy.  She was deemed not a candiate for chemo.  Palliative care consulted, decision was made after discussion with pt's family to transition her to comfort care.  Pt passed away under comfort care.     Consults: IP CONSULT TO OBSTETRICS  IP CONSULT TO ONCOLOGY  NURSING CONSULT TO DIETITIAN  IP CONSULT TO GASTROENTEROLOGY  IP CONSULT PALLIATIVE CARE  IP CONSULT TO UROLOGY  IP CONSULT TO SOCIAL WORK  IP CONSULT TO PULMONOLOGY  NURSING CONSULT TO DIETITIAN  IP CONSULT TO SPIRITUAL CARE  IP CONSULT TO  ONCOLOGY  IP CONSULT TO SOCIAL WORK    Operative Procedures: Procedure(s) (LRB):  ESOPHAGOGASTRODUODENOSCOPY (EGD)  with biopsy COLONOSCOPY (N/A)  . (N/A)       Patient instructions:          Activity: N/A  Diet: N/A  Wound Care: as directed  Code Status: DNAR/Comfort Care      Exam on day of discharge:     Vitals:    01/19/24 1959   BP:    Pulse: (!) 0   Resp: (!) 42   Temp:          Maikel Valera DO  [facility]

## 2024-01-22 NOTE — PAYOR COMM NOTE
--------------  DISCHARGE REVIEW    Payor: [employer] OPEN ACCESS   Subscriber #:  [id number]  Authorization Number: [id number]    Admit date: 24  Admit time:   2:50 PM  Discharge Date: 2024  7:42 PM     Admitting Physician: Harshal Aponte MD  Attending Physician:  No att. providers found  Primary Care Physician: Renetta Santos DO          Discharge Summary Notes        Discharge Summary signed by Maikel Valera DO at 2024  9:07 AM       Author: Maikel Valera DO Specialty: Internal Medicine Author Type: Physician    Filed: 2024  9:07 AM Date of Service: 2024  8:52 AM Status: Signed    : Maikel Valera DO (Physician)                                                          [facility] Internal Medicine Hospitalist Discharge Summary     Patient ID:  Alysia Campos  53 year old  7/15/1970    Admit date: 2024    Discharge date and time: 2024    Attending Physician: Maikel Valera DO     Primary Care Physician: Renetta Santos DO     Discharge Diagnoses:   Metastatic carcinoma poorly differentiated carcinoma of unknown primary   Acute hypoxic respiratory failure, suspect due to lymphangitic spread and pulmonary masses  Acute encephalopathy, multifactorial     Please note that only IHP DMG and EMG patients enrolled in the Medicare ACO, [facility] ACO and [facility] HMOs will be handled by the [facility] Care Management team.  For all other patients, please follow usual protocol for discharge care transition.    Discharge Condition:      Disposition:       Important Follow up:  - PCP within N/A  - Consults: Pulm, hem/onc, psych, palliative care    Follow Up Items:  N/A      Hospital Course:      53 year old female who presents with symptomatic acute on chronic anemia, hyponatremia and multiple intraabdominal masses concerning for metastic disease.  Hem/onc consulted, pt underwent liver bx.  She then underwent MRI brain under anesthesia which was neg for mets.   After the MRI she developed acute respiratory failure, was transferred to ICU.  Despite IV diuretics, IV abx she continued to worsen clinically, developed worsening encephalopathy.  She was deemed not a candiate for chemo.  Palliative care consulted, decision was made after discussion with pt's family to transition her to comfort care.  Pt passed away under comfort care.     Consults: IP CONSULT TO OBSTETRICS  IP CONSULT TO ONCOLOGY  NURSING CONSULT TO DIETITIAN  IP CONSULT TO GASTROENTEROLOGY  IP CONSULT PALLIATIVE CARE  IP CONSULT TO UROLOGY  IP CONSULT TO SOCIAL WORK  IP CONSULT TO PULMONOLOGY  NURSING CONSULT TO DIETITIAN  IP CONSULT TO SPIRITUAL CARE  IP CONSULT TO ONCOLOGY  IP CONSULT TO SOCIAL WORK    Operative Procedures: Procedure(s) (LRB):  ESOPHAGOGASTRODUODENOSCOPY (EGD)  with biopsy COLONOSCOPY (N/A)  . (N/A)       Patient instructions:          Activity: N/A  Diet: N/A  Wound Care: as directed  Code Status: DNAR/Comfort Care      Exam on day of discharge:     Vitals:    01/19/24 1959   BP:    Pulse: (!) 0   Resp: (!) 42   Temp:          Maikel Valera DO  [facility]       Electronically signed by Maikel Valera DO on 1/20/2024  9:07 AM         REVIEWER COMMENTS

## (undated) DEVICE — GLOVE,SURG,SENSICARE,ALOE,LF,PF,7: Brand: MEDLINE

## (undated) DEVICE — STERIS KITS

## (undated) DEVICE — 3M™ RED DOT™ MONITORING ELECTRODE WITH FOAM TAPE AND STICKY GEL, 50/BAG, 20/CASE, 72/PLT 2570: Brand: RED DOT™

## (undated) DEVICE — 10FT COMBINED O2 DELIVERY/CO2 MONITORING. FILTER WITH MICROSTREAM TYPE LUER: Brand: DUAL ADULT NASAL CANNULA

## (undated) DEVICE — 1200CC GUARDIAN II: Brand: GUARDIAN

## (undated) DEVICE — BITEBLOCK ENDOSCP 60FR MAXI STRP

## (undated) DEVICE — KIT VLV 5 PC AIR H2O SUCT BX ENDOGATOR CONN

## (undated) DEVICE — GIJAW SINGLE-USE BIOPSY FORCEPS WITH NEEDLE: Brand: GIJAW

## (undated) NOTE — LETTER
[address]  [address]  Authorization for Surgical Operation and Procedure     Date:___________                                                                                                         Time:__________  I hereby authorize * Surgery not found *, my physician and his/her assistants (if applicable), which may include medical students, residents, and/or fellows, to perform the following surgical operation/ procedure and administer such anesthesia as may be determined necessary by my physician:  Operation/Procedure name (s)  on Alysia Campos   2.   I recognize that during the surgical operation/procedure, unforeseen conditions may necessitate additional or different procedures than those listed above.  I, therefore, further authorize and request that the above-named surgeon, assistants, or designees perform such procedures as are, in their judgment, necessary and desirable.    3.   My surgeon/physician has discussed prior to my surgery the potential benefits, risks and side effects of this procedure; the likelihood of achieving goals; and potential problems that might occur during recuperation.  They also discussed reasonable alternatives to the procedure, including risks, benefits, and side effects related to the alternatives and risks related to not receiving this procedure.  I have had all my questions answered and I acknowledge that no guarantee has been made as to the result that may be obtained.    4.   Should the need arise during my operation/procedure, which includes change of level of care prior to discharge, I also consent to the administration of blood and/or blood products.  Further, I understand that despite careful testing and screening of blood or blood products by collecting agencies, I may still be subject to ill effects as a result of receiving a blood transfusion and/or blood products.  The following are some, but not all, of the potential risks  that can occur: fever and allergic reactions, hemolytic reactions, transmission of diseases such as Hepatitis, AIDS and Cytomegalovirus (CMV) and fluid overload.  In the event that I wish to have an autologous transfusion of my own blood, or a directed donor transfusion, I will discuss this with my physician.  Check only if Refusing Blood or Blood Products  I understand refusal of blood or blood products as deemed necessary by my physician may have serious consequences to my condition to include possible death. I hereby assume responsibility for my refusal and release the hospital, its personnel, and my physicians from any responsibility for the consequences of my refusal.          o  Refuse      5.   I authorize the use of any specimen, organs, tissues, body parts or foreign objects that may be removed from my body during the operation/procedure for diagnosis, research or teaching purposes and their subsequent disposal by hospital authorities.  I also authorize the release of specimen test results and/or written reports to my treating physician on the hospital medical staff or other referring or consulting physicians involved in my care, at the discretion of the Pathologist or my treating physician.    6.   I consent to the photographing or videotaping of the operations or procedures to be performed, including appropriate portions of my body for medical, scientific, or educational purposes, provided my identity is not revealed by the pictures or by descriptive texts accompanying them.  If the procedure has been photographed/videotaped, the surgeon will obtain the original picture, image, videotape or CD.  The hospital will not be responsible for storage, release or maintenance of the picture, image, tape or CD.    7.   I consent to the presence of a  or observers in the operating room as deemed necessary by my physician or their designees.    8.   I recognize that in the event my procedure results  in extended X-Ray/fluoroscopy time, I may develop a skin reaction.    9. If I have a Do Not Attempt Resuscitation (DNAR) order in place, that status will be suspended while in the operating room, procedural suite, and during the recovery period unless otherwise explicitly stated by me (or a person authorized to consent on my behalf). The surgeon or my attending physician will determine when the applicable recovery period ends for purposes of reinstating the DNAR order.  10. Patients having a sterilization procedure: I understand that if the procedure is successful the results will be permanent and it will therefore be impossible for me to inseminate, conceive, or bear children.  I also understand that the procedure is intended to result in sterility, although the result has not been guaranteed.   11. I acknowledge that my physician has explained sedation/analgesia administration to me including the risk and benefits I consent to the administration of sedation/analgesia as may be necessary or desirable in the judgment of my physician.    I CERTIFY THAT I HAVE READ AND FULLY UNDERSTAND THE ABOVE CONSENT TO OPERATION and/or OTHER PROCEDURE.    _________________________________________  __________________________________  Signature of Patient     Signature of Responsible Person         ___________________________________         Printed Name of Responsible Person           _________________________________                 Relationship to Patient  _________________________________________  ______________________________  Signature of Witness          Date  Time      Patient Name: Alysia Campos     : 7/15/1970                 Printed: January 10, 2024     Medical Record #: [id number]                     Page [address] of [address] [place] [address]  [id number]    Consent for Anesthesia    I, Alysia Campos agree to be cared for by an anesthesiologist, who is specially  trained to monitor me and give me medicine to put me to sleep or keep me comfortable during my procedure    I understand that my anesthesiologist is not an employee or agent of [facility] or [employer]. He or she works for [employer].    As the patient asking for anesthesia services, I agree to:  Allow the anesthesiologist (anesthesia doctor) to give me medicine and do additional procedures as necessary. Some examples are: Starting or using an “IV” to give me medicine, fluids or blood during my procedure, and having a breathing tube placed to help me breathe when I’m asleep (intubation). In the event that my heart stops working properly, I understand that my anesthesiologist will make every effort to sustain my life, unless otherwise directed by [facility] Do Not Resuscitate documents.  Tell my anesthesia doctor before my procedure:  If I am pregnant.  The last time that I ate or drank.  All of the medicines I take (including prescriptions, herbal supplements, and pills I can buy without a prescription (including street drugs/illegal medications). Failure to inform my anesthesiologist about these medicines may increase my risk of anesthetic complications.  If I am allergic to anything or have had a reaction to anesthesia before.  I understand how the anesthesia medicine will help me (benefits).  I understand that with any type of anesthesia medicine there are risks:  The most common risks are: nausea, vomiting, sore throat, muscle soreness, damage to my eyes, mouth, or teeth (from breathing tube placement).  Rare risks include: remembering what happened during my procedure, allergic reactions to medications, injury to my airway, heart, lungs, vision, nerves, or muscles and in extremely rare instances death.  My doctor has explained to me other choices available to me for my care (alternatives).  Pregnant Patients (“epidural”):  I understand that the risks of having an  epidural (medicine given into my back to help control pain during labor), include itching, low blood pressure, difficulty urinating, headache or slowing of the baby’s heart. Very rare risks include infection, bleeding, seizure, irregular heart rhythms and nerve injury.  Regional Anesthesia (“spinal”, “epidural”, & “nerve blocks”):  I understand that rare but potential complications include headache, bleeding, infection, seizure, irregular heart rhythms, and nerve injury.    I can change my mind about having anesthesia services at any time before I get the medicine.    _____________________________________________________________________________  Patient (or Representative) Signature/Relationship to Patient  Date   Time    _____________________________________________________________________________   Name (if used)    Language/Organization   Time    _____________________________________________________________________________  Anesthesiologist Signature     Date   Time  I have discussed the procedure and information above with the patient (or patient’s representative) and answered their questions. The patient or their representative has agreed to have anesthesia services.    _____________________________________________________________________________  Witness        Date   Time  I have verified that the signature is that of the patient or patient’s representative, and that it was signed before the procedure  Patient Name: Alysia Campos     : 7/15/1970                 Printed: January 10, 2024     Medical Record #: [id number]                     Page 2 of 2

## (undated) NOTE — LETTER
[address]  [address]  Authorization for Surgical Operation and Procedure     Date:___________                                                                                                         Time:__________  I hereby authorize Dr Morton my physician and his/her assistants (if applicable), which may include medical students, residents, and/or fellows, to perform the following surgical operation/ procedure : EGD and colonoscopy, and administer such anesthesia as may be determined necessary by my physician:  Operation/Procedure name (s)  on Alysia Campos   2.   I recognize that during the surgical operation/procedure, unforeseen conditions may necessitate additional or different procedures than those listed above.  I, therefore, further authorize and request that the above-named surgeon, assistants, or designees perform such procedures as are, in their judgment, necessary and desirable.    3.   My surgeon/physician has discussed prior to my surgery the potential benefits, risks and side effects of this procedure; the likelihood of achieving goals; and potential problems that might occur during recuperation.  They also discussed reasonable alternatives to the procedure, including risks, benefits, and side effects related to the alternatives and risks related to not receiving this procedure.  I have had all my questions answered and I acknowledge that no guarantee has been made as to the result that may be obtained.    4.   Should the need arise during my operation/procedure, which includes change of level of care prior to discharge, I also consent to the administration of blood and/or blood products.  Further, I understand that despite careful testing and screening of blood or blood products by collecting agencies, I may still be subject to ill effects as a result of receiving a blood transfusion and/or blood products.  The following are some, but not all, of the  potential risks that can occur: fever and allergic reactions, hemolytic reactions, transmission of diseases such as Hepatitis, AIDS and Cytomegalovirus (CMV) and fluid overload.  In the event that I wish to have an autologous transfusion of my own blood, or a directed donor transfusion, I will discuss this with my physician.  Check only if Refusing Blood or Blood Products  I understand refusal of blood or blood products as deemed necessary by my physician may have serious consequences to my condition to include possible death. I hereby assume responsibility for my refusal and release the hospital, its personnel, and my physicians from any responsibility for the consequences of my refusal.          o  Refuse      5.   I authorize the use of any specimen, organs, tissues, body parts or foreign objects that may be removed from my body during the operation/procedure for diagnosis, research or teaching purposes and their subsequent disposal by hospital authorities.  I also authorize the release of specimen test results and/or written reports to my treating physician on the hospital medical staff or other referring or consulting physicians involved in my care, at the discretion of the Pathologist or my treating physician.    6.   I consent to the photographing or videotaping of the operations or procedures to be performed, including appropriate portions of my body for medical, scientific, or educational purposes, provided my identity is not revealed by the pictures or by descriptive texts accompanying them.  If the procedure has been photographed/videotaped, the surgeon will obtain the original picture, image, videotape or CD.  The hospital will not be responsible for storage, release or maintenance of the picture, image, tape or CD.    7.   I consent to the presence of a  or observers in the operating room as deemed necessary by my physician or their designees.    8.   I recognize that in the event my  procedure results in extended X-Ray/fluoroscopy time, I may develop a skin reaction.    9. If I have a Do Not Attempt Resuscitation (DNAR) order in place, that status will be suspended while in the operating room, procedural suite, and during the recovery period unless otherwise explicitly stated by me (or a person authorized to consent on my behalf). The surgeon or my attending physician will determine when the applicable recovery period ends for purposes of reinstating the DNAR order.  10. Patients having a sterilization procedure: I understand that if the procedure is successful the results will be permanent and it will therefore be impossible for me to inseminate, conceive, or bear children.  I also understand that the procedure is intended to result in sterility, although the result has not been guaranteed.   11. I acknowledge that my physician has explained sedation/analgesia administration to me including the risk and benefits I consent to the administration of sedation/analgesia as may be necessary or desirable in the judgment of my physician.    I CERTIFY THAT I HAVE READ AND FULLY UNDERSTAND THE ABOVE CONSENT TO OPERATION and/or OTHER PROCEDURE.    _________________________________________  __________________________________  Signature of Patient     Signature of Responsible Person         ___________________________________         Printed Name of Responsible Person           _________________________________                 Relationship to Patient  _________________________________________  ______________________________  Signature of Witness          Date  Time      Patient Name: Alysia Campos     : 7/15/1970                 Printed: January 10, 2024     Medical Record #: [id number]                     Page 1 [address]  [address]    Consent for Anesthesia    I, Alysia Campos agree to be cared for by an anesthesiologist, who  is specially trained to monitor me and give me medicine to put me to sleep or keep me comfortable during my procedure    I understand that my anesthesiologist is not an employee or agent of [facility] or [employer]. He or she works for [employer].    As the patient asking for anesthesia services, I agree to:  Allow the anesthesiologist (anesthesia doctor) to give me medicine and do additional procedures as necessary. Some examples are: Starting or using an “IV” to give me medicine, fluids or blood during my procedure, and having a breathing tube placed to help me breathe when I’m asleep (intubation). In the event that my heart stops working properly, I understand that my anesthesiologist will make every effort to sustain my life, unless otherwise directed by [facility] Do Not Resuscitate documents.  Tell my anesthesia doctor before my procedure:  If I am pregnant.  The last time that I ate or drank.  All of the medicines I take (including prescriptions, herbal supplements, and pills I can buy without a prescription (including street drugs/illegal medications). Failure to inform my anesthesiologist about these medicines may increase my risk of anesthetic complications.  If I am allergic to anything or have had a reaction to anesthesia before.  I understand how the anesthesia medicine will help me (benefits).  I understand that with any type of anesthesia medicine there are risks:  The most common risks are: nausea, vomiting, sore throat, muscle soreness, damage to my eyes, mouth, or teeth (from breathing tube placement).  Rare risks include: remembering what happened during my procedure, allergic reactions to medications, injury to my airway, heart, lungs, vision, nerves, or muscles and in extremely rare instances death.  My doctor has explained to me other choices available to me for my care (alternatives).  Pregnant Patients (“epidural”):  I understand that the risks of  having an epidural (medicine given into my back to help control pain during labor), include itching, low blood pressure, difficulty urinating, headache or slowing of the baby’s heart. Very rare risks include infection, bleeding, seizure, irregular heart rhythms and nerve injury.  Regional Anesthesia (“spinal”, “epidural”, & “nerve blocks”):  I understand that rare but potential complications include headache, bleeding, infection, seizure, irregular heart rhythms, and nerve injury.    I can change my mind about having anesthesia services at any time before I get the medicine.    _____________________________________________________________________________  Patient (or Representative) Signature/Relationship to Patient  Date   Time    _____________________________________________________________________________   Name (if used)    Language/Organization   Time    _____________________________________________________________________________  Anesthesiologist Signature     Date   Time  I have discussed the procedure and information above with the patient (or patient’s representative) and answered their questions. The patient or their representative has agreed to have anesthesia services.    _____________________________________________________________________________  Witness        Date   Time  I have verified that the signature is that of the patient or patient’s representative, and that it was signed before the procedure  Patient Name: Alysia Campos     : 7/15/1970                 Printed: January 10, 2024     Medical Record #: [id number]                     Page 2 of 2

## (undated) NOTE — LETTER
[facility]  [address] [facility] [address]  [phone]    Blood Transfusion Consent    In the course of your treatment, it may become necessary to administer a transfusion of blood or blood components. This form provides basic information concerning this procedure and, if signed by you, authorizes its administration. By signing this form, you agree that all of your questions about the administration of blood or blood products have been answered by the ordering medical professional or designee.    Description of Procedure  Blood is introduced into one of your veins, commonly in the arm, using a sterilized disposable needle. The amount of blood transfused, and whether the transfusion will be of blood or blood components is a judgement the physician will make based on your particular needs.    Risks  The transfusion is a common procedure of low risk.  MINOR AND TEMPORARY REACTIONS ARE NOT UNCOMMON, including a slight bruise, swelling or local reaction in the area where the needle pierces your skin, or a nonserious reaction to the transfused material itself, including headache, fever or mild skin reaction, such as rash.  Serious reactions are possible, though very unlikely, and include severe allergic reaction (shock) and destruction (hemolysis) of transfused blood cells.  Infectious diseases which are known to be transmitted by blood transfusion include certain types of viral Hepatitis(liver infection from a virus), Human Immunodeficiency Virus (HIV-1,2) infection, a viral infection known to cause Acquired Immunodeficiency Syndrome (AIDS), as well as certain other bacterial, viral, and parasitic diseases. While a minimal risk of acquiring an infectious disease from transfused blood exists, in accordance with the Federal and State law, all due care has been taken in donor selection and testing to avoid transmission of disease.    Alternatives  If loss of blood poses serious threats during your  treatment, THERE IS NO EFFECTIVE ALTERNATIVE TO BLOOD TRANSFUSION. However, if you have any further questions on this matter, your provider will fully explain the alternatives to you if it has not already been done.    I, ______________________________, have read/had read to me the above. I understand the matters bearing on the decision whether or not to authorize a transfusion of blood or blood components. I have no questions which have not been answered to my full satisfaction. I hereby consent to such transfusion as my physician may deem necessary or advisable in the course of my treatment.    ______________________________________________                    ___________________________  (Signature of Patient or Responsible party in case of minor,                 (Printed Name of Patient or incompetent, or unconscious patient)              Responsible Party)    ___________________________               _____________________  (Relationship to Patient if not self)                                    (Date and Time)    __________________________                                                           ______________________              (Signature of Witness)               (Printed Name of Witness)     Language line ()    Telephone/Verbal/Video Consent    __________________________                     ____________________  (Signature of 2nd Witness           (Printed Name of 2nd  Telephone/Verbal/Video Consent)           Witness)    Patient Name: Alysia Campos     : 7/15/1970                 Printed: 2024     Medical Record #: [id number]      Rev: 2023